# Patient Record
Sex: FEMALE | Race: ASIAN | Employment: FULL TIME | URBAN - METROPOLITAN AREA
[De-identification: names, ages, dates, MRNs, and addresses within clinical notes are randomized per-mention and may not be internally consistent; named-entity substitution may affect disease eponyms.]

---

## 2020-02-18 ENCOUNTER — OFFICE VISIT (OUTPATIENT)
Dept: PRIMARY CARE CLINIC | Age: 40
End: 2020-02-18
Payer: COMMERCIAL

## 2020-02-18 VITALS
OXYGEN SATURATION: 99 % | SYSTOLIC BLOOD PRESSURE: 122 MMHG | HEIGHT: 61 IN | DIASTOLIC BLOOD PRESSURE: 72 MMHG | BODY MASS INDEX: 26.09 KG/M2 | HEART RATE: 93 BPM | WEIGHT: 138.2 LBS | RESPIRATION RATE: 16 BRPM

## 2020-02-18 PROBLEM — G56.01 CARPAL TUNNEL SYNDROME OF RIGHT WRIST: Status: ACTIVE | Noted: 2020-02-18

## 2020-02-18 PROBLEM — K44.9 HIATAL HERNIA: Status: ACTIVE | Noted: 2020-02-18

## 2020-02-18 PROBLEM — K21.9 GASTROESOPHAGEAL REFLUX DISEASE WITHOUT ESOPHAGITIS: Status: ACTIVE | Noted: 2020-02-18

## 2020-02-18 PROCEDURE — 99386 PREV VISIT NEW AGE 40-64: CPT | Performed by: FAMILY MEDICINE

## 2020-02-18 PROCEDURE — G8484 FLU IMMUNIZE NO ADMIN: HCPCS | Performed by: FAMILY MEDICINE

## 2020-02-18 RX ORDER — PREDNISONE 10 MG/1
TABLET ORAL
Qty: 18 TABLET | Refills: 0 | Status: SHIPPED
Start: 2020-02-18 | End: 2020-03-17 | Stop reason: ALTCHOICE

## 2020-02-18 RX ORDER — PANTOPRAZOLE SODIUM 40 MG/1
40 TABLET, DELAYED RELEASE ORAL
Qty: 30 TABLET | Refills: 5 | Status: SHIPPED
Start: 2020-02-18 | End: 2020-03-26 | Stop reason: SDUPTHER

## 2020-02-18 RX ORDER — FLUTICASONE PROPIONATE 50 MCG
2 SPRAY, SUSPENSION (ML) NASAL DAILY
Qty: 1 BOTTLE | Refills: 5 | Status: SHIPPED
Start: 2020-02-18 | End: 2020-04-26 | Stop reason: SDUPTHER

## 2020-02-18 ASSESSMENT — ENCOUNTER SYMPTOMS
SINUS COMPLAINT: 1
SWOLLEN GLANDS: 0
VOMITING: 0
EYE REDNESS: 0
ABDOMINAL PAIN: 1
EYE PAIN: 0
CHEST TIGHTNESS: 0
COUGH: 0
CONSTIPATION: 0
SINUS PRESSURE: 1
BACK PAIN: 0
RHINORRHEA: 0
SHORTNESS OF BREATH: 0
COLOR CHANGE: 0
EYE ITCHING: 0
APNEA: 0
BLOOD IN STOOL: 0
NAUSEA: 0
WHEEZING: 0
SORE THROAT: 0
BELCHING: 1
DIARRHEA: 0

## 2020-02-18 ASSESSMENT — PATIENT HEALTH QUESTIONNAIRE - PHQ9
2. FEELING DOWN, DEPRESSED OR HOPELESS: 0
1. LITTLE INTEREST OR PLEASURE IN DOING THINGS: 0
SUM OF ALL RESPONSES TO PHQ QUESTIONS 1-9: 0
SUM OF ALL RESPONSES TO PHQ9 QUESTIONS 1 & 2: 0
SUM OF ALL RESPONSES TO PHQ QUESTIONS 1-9: 0

## 2020-02-18 NOTE — PROGRESS NOTES
Chief Complaint:     Chief Complaint   Patient presents with    [facility]    Sinus Problem    Abdominal Pain    Hand Pain         Sinus Problem   This is a recurrent problem. The current episode started more than 1 month ago. The problem has been waxing and waning since onset. There has been no fever. The pain is moderate. Associated symptoms include congestion, headaches and sinus pressure. Pertinent negatives include no coughing, diaphoresis, ear pain, neck pain, shortness of breath, sore throat or swollen glands. Past treatments include nothing. The treatment provided no relief. Abdominal Pain   This is a recurrent problem. The current episode started more than 1 month ago. The problem occurs intermittently. The problem has been waxing and waning. The pain is located in the epigastric region. The quality of the pain is aching and dull. The abdominal pain does not radiate. Associated symptoms include belching and headaches. Pertinent negatives include no arthralgias, constipation, diarrhea, dysuria, fever, frequency, hematuria, myalgias, nausea, vomiting or weight loss. Nothing aggravates the pain. The pain is relieved by nothing. She has tried nothing for the symptoms. The treatment provided no relief. Hand Pain    The incident occurred more than 1 week ago. The injury mechanism was repetitive motion. The pain is present in the right hand and right wrist. The quality of the pain is described as aching and burning. Associated symptoms include muscle weakness, numbness and tingling. Pertinent negatives include no chest pain. The symptoms are aggravated by movement, lifting and palpation. She has tried rest for the symptoms. The treatment provided mild relief.        Patient Active Problem List   Diagnosis    Gastroesophageal reflux disease without esophagitis    Hiatal hernia    Carpal tunnel syndrome of right wrist       Past Medical History:   Diagnosis Date    Hypothyroidism        History fatigue, fever and weight loss. HENT: Positive for congestion and sinus pressure. Negative for ear pain, hearing loss, nosebleeds, rhinorrhea and sore throat. Eyes: Negative for pain, redness, itching and visual disturbance. Respiratory: Negative for apnea, cough, chest tightness, shortness of breath and wheezing. Cardiovascular: Negative for chest pain, palpitations and leg swelling. Gastrointestinal: Positive for abdominal pain. Negative for blood in stool, constipation, diarrhea, nausea and vomiting. Endocrine: Negative. Genitourinary: Negative for decreased urine volume, difficulty urinating, dysuria, frequency, hematuria and urgency. Musculoskeletal: Negative for arthralgias, back pain, gait problem, myalgias and neck pain. Skin: Negative for color change and rash. Allergic/Immunologic: Negative for environmental allergies and food allergies. Neurological: Positive for tingling, numbness and headaches. Negative for dizziness, weakness and light-headedness. Hematological: Negative for adenopathy. Does not bruise/bleed easily. Psychiatric/Behavioral: Negative for behavioral problems, dysphoric mood and sleep disturbance. The patient is not nervous/anxious and is not hyperactive. All other systems reviewed and are negative. /72   Pulse 93   Resp 16   Ht 5' 1\" (1.549 m)   Wt 138 lb 3.2 oz (62.7 kg)   LMP 02/03/2020   SpO2 99%   BMI 26.11 kg/m²     Physical Exam  Vitals signs and nursing note reviewed. Constitutional:       General: She is not in acute distress. Appearance: Normal appearance. She is well-developed. HENT:      Head: Normocephalic and atraumatic. Right Ear: Hearing, tympanic membrane and external ear normal. No tenderness. No middle ear effusion. Left Ear: Hearing, tympanic membrane and external ear normal. No tenderness. No middle ear effusion. Nose: Nose normal. No congestion or rhinorrhea.       Right Turbinates: Not enlarged. Left Turbinates: Not enlarged. Mouth/Throat:      Mouth: Mucous membranes are moist.      Tongue: No lesions. Pharynx: Oropharynx is clear. No oropharyngeal exudate or posterior oropharyngeal erythema. Eyes:      General: No scleral icterus. Conjunctiva/sclera: Conjunctivae normal.      Pupils: Pupils are equal, round, and reactive to light. Neck:      Musculoskeletal: Normal range of motion and neck supple. No neck rigidity or muscular tenderness. Thyroid: No thyromegaly. Cardiovascular:      Rate and Rhythm: Normal rate and regular rhythm. Heart sounds: Normal heart sounds. No murmur. Pulmonary:      Effort: Pulmonary effort is normal. No respiratory distress. Breath sounds: Normal breath sounds. No wheezing or rales. Abdominal:      General: Bowel sounds are normal. There is no distension. Palpations: Abdomen is soft. Tenderness: There is no abdominal tenderness. Musculoskeletal: Normal range of motion. General: No tenderness. Lymphadenopathy:      Cervical: No cervical adenopathy. Skin:     General: Skin is warm and dry. Findings: No erythema or rash. Neurological:      General: No focal deficit present. Mental Status: She is alert and oriented to person, place, and time. Cranial Nerves: No cranial nerve deficit. Deep Tendon Reflexes: Reflexes are normal and symmetric. Reflexes normal.   Psychiatric:         Mood and Affect: Mood normal.                                 ASSESSMENT/PLAN:    Patient Active Problem List   Diagnosis    Gastroesophageal reflux disease without esophagitis    Hiatal hernia    Carpal tunnel syndrome of right wrist       Doyne Syracuse was seen today for establish care, sinus problem, abdominal pain and hand pain.     Diagnoses and all orders for this visit:    History of nasal polyp  -     [facility] [place] [place], DO, Ear, Nose, Throat, [place]    Gastroesophageal reflux disease without esophagitis    Hiatal hernia  -     XR CHEST STANDARD (2 VW); Future  -     XR ABDOMEN (KUB) (SINGLE AP VIEW); Future    Carpal tunnel syndrome of right wrist  -     XR WRIST RIGHT (MIN 3 VIEWS); Future  -     Eleanor Abreu MD, [facility], [place] [address]    Encounter for general adult medical examination with abnormal findings  -     CBC; Future  -     Comprehensive Metabolic Panel; Future  -     Lipid Panel; Future  -     TSH without Reflex; Future    Chronic sinusitis, unspecified location  -     [facility], , Ear, Nose, Throat, [place]    Other orders  -     fluticasone (FLONASE) 50 MCG/ACT nasal spray; 2 sprays by Each Nostril route daily  -     pantoprazole (PROTONIX) 40 MG tablet; Take 1 tablet by mouth every morning (before breakfast)  -     predniSONE (DELTASONE) 10 MG tablet; 30mg daily x3 days, then 20mg daily x3 days, then 10mg daily x3 days          Return in about 1 year (around 2/18/2021) for well check. I spent 45 minutes with this patient. I spent greater than 50% of the time counseling this patient.         Regi Palma, DO  2/18/2020  3:34 PM

## 2020-02-22 ENCOUNTER — HOSPITAL ENCOUNTER (OUTPATIENT)
Age: 40
Discharge: HOME | End: 2020-02-24
Payer: COMMERCIAL

## 2020-02-22 LAB
ALBUMIN SERPL-MCNC: 4.5 G/DL (ref 3.5–5.2)
ALP BLD-CCNC: 62 U/L (ref 35–104)
ALT SERPL-CCNC: 13 U/L (ref 0–32)
ANION GAP SERPL CALCULATED.3IONS-SCNC: 16 MMOL/L (ref 7–16)
AST SERPL-CCNC: 16 U/L (ref 0–31)
BILIRUB SERPL-MCNC: 0.5 MG/DL (ref 0–1.2)
BUN BLDV-MCNC: 15 MG/DL (ref 6–20)
CALCIUM SERPL-MCNC: 9.8 MG/DL (ref 8.6–10.2)
CHLORIDE BLD-SCNC: 98 MMOL/L (ref 98–107)
CHOLESTEROL, TOTAL: 332 MG/DL (ref 0–199)
CO2: 20 MMOL/L (ref 22–29)
CREAT SERPL-MCNC: 0.5 MG/DL (ref 0.5–1)
GFR AFRICAN AMERICAN: >60
GFR NON-AFRICAN AMERICAN: >60 ML/MIN/1.73
GLUCOSE BLD-MCNC: 279 MG/DL (ref 74–99)
HCT VFR BLD CALC: 46.4 % (ref 34–48)
HDLC SERPL-MCNC: 61 MG/DL
HEMOGLOBIN: 15.3 G/DL (ref 11.5–15.5)
LDL CHOLESTEROL CALCULATED: 243 MG/DL (ref 0–99)
MCH RBC QN AUTO: 28.6 PG (ref 26–35)
MCHC RBC AUTO-ENTMCNC: 33 % (ref 32–34.5)
MCV RBC AUTO: 86.7 FL (ref 80–99.9)
PDW BLD-RTO: 11.9 FL (ref 11.5–15)
PLATELET # BLD: 204 E9/L (ref 130–450)
PMV BLD AUTO: 12.4 FL (ref 7–12)
POTASSIUM SERPL-SCNC: 4.1 MMOL/L (ref 3.5–5)
RBC # BLD: 5.35 E12/L (ref 3.5–5.5)
SODIUM BLD-SCNC: 134 MMOL/L (ref 132–146)
TOTAL PROTEIN: 8.1 G/DL (ref 6.4–8.3)
TRIGL SERPL-MCNC: 138 MG/DL (ref 0–149)
TSH SERPL DL<=0.05 MIU/L-ACNC: 2.89 UIU/ML (ref 0.27–4.2)
VLDLC SERPL CALC-MCNC: 28 MG/DL
WBC # BLD: 7.9 E9/L (ref 4.5–11.5)

## 2020-02-22 PROCEDURE — 85027 COMPLETE CBC AUTOMATED: CPT

## 2020-02-22 PROCEDURE — 36415 COLL VENOUS BLD VENIPUNCTURE: CPT

## 2020-02-22 PROCEDURE — 80053 COMPREHEN METABOLIC PANEL: CPT

## 2020-02-22 PROCEDURE — 84443 ASSAY THYROID STIM HORMONE: CPT

## 2020-02-22 PROCEDURE — 80061 LIPID PANEL: CPT

## 2020-02-24 RX ORDER — ATORVASTATIN CALCIUM 20 MG/1
20 TABLET, FILM COATED ORAL DAILY
Qty: 30 TABLET | Refills: 5 | Status: SHIPPED
Start: 2020-02-24 | End: 2020-06-18 | Stop reason: SDUPTHER

## 2020-02-24 RX ORDER — GLIMEPIRIDE 2 MG/1
2 TABLET ORAL
Qty: 30 TABLET | Refills: 5 | Status: SHIPPED
Start: 2020-02-24 | End: 2020-03-26 | Stop reason: SDUPTHER

## 2020-03-04 ENCOUNTER — TELEPHONE (OUTPATIENT)
Dept: PRIMARY CARE CLINIC | Age: 40
End: 2020-03-04

## 2020-03-04 RX ORDER — BLOOD-GLUCOSE METER
1 KIT MISCELLANEOUS DAILY
Qty: 1 KIT | Refills: 0 | Status: SHIPPED
Start: 2020-03-04 | End: 2021-10-08

## 2020-03-04 RX ORDER — LANCETS 28 GAUGE
1 EACH MISCELLANEOUS DAILY
Qty: 100 EACH | Refills: 3 | Status: SHIPPED
Start: 2020-03-04 | End: 2020-10-22 | Stop reason: SDUPTHER

## 2020-03-09 ENCOUNTER — HOSPITAL ENCOUNTER (OUTPATIENT)
Dept: NEUROLOGY | Age: 40
Discharge: HOME | End: 2020-03-09
Payer: COMMERCIAL

## 2020-03-09 PROCEDURE — 95911 NRV CNDJ TEST 9-10 STUDIES: CPT

## 2020-03-09 PROCEDURE — 95886 MUSC TEST DONE W/N TEST COMP: CPT

## 2020-03-09 NOTE — LETTER
RE:  Charles Woodsydnee    Dear Dr. Luz Marina Chavarria,     Enclosed you will find a copy of the requested EMG on your patient, Charles Sheffield completed 3/9/2020. EMG Findings:     Donna Burrows MD   Physician   Internal Medicine   Procedures   Signed   Date of Service:  3/9/2020  1:30 PM            Procedure Orders   EMG [[id number]] ordered by  at 02/19/20 1142   Pre-procedure Diagnoses   Carpal tunnel syndrome of right wrist [G56.01]   Procedures   EMG [NEU5]          Signed             Show:Clear all  [x]Manual[x]Template[x]Copied    Added by:  Debbie Painter MD    []Shaylee for details   [address] [place] [address]  Electrodiagnostic Laboratory   [place]          Full Name:    Charles Sheffield                            Gender:             Female  MRN:             [id number]                                                   YOB: 1980  Location[de-identified]     [facility] ([facility])      Visit Date:                          3/9/2020 11:07  Age:                                   35 Years 9 Months Old  Examining Physician:      James Hope   Referring Physician:        Dr. Azar Cabrera  Technician:                       Viridiana Heath   Height:                               5 feet 0 inch  Weight:                              138 lbs  Notes:                                Carpal Tunnel Syndrome   BMI: 27.0      Motor NCS      Nerve / Sites Lat. Lat Diff Amplitude Amp. 1-2 Distance Velocity Temp.     ms ms mV % cm m/s °C   R Median - APB      Wrist 5.36   2.8 100 8   32.9      Elbow 8.49 3.12 2.1 90 18 58 32.9   L Median - APB      Wrist 6.15   7.4 100 8   32.6      Elbow 10.36 4.22 7.3 98.5 18 43 32.6   R Ulnar - ADM      Wrist 2.97   11.0 100 8   32.4      B. Elbow 5.99 3.02 10.7 97.6 17 56 32.2      A. Elbow 7.71 1.72 10.3 94 10 58 32.1   L Ulnar - ADM      Wrist 2.81   8.0 100 8   32.1      B. Elbow 6.35 3.54 7.6 94.8 17 48 32.2  Highest education level: Not on file   Occupational History    Not on file   Social Needs    Financial resource strain: Not on file    Food insecurity     Worry: Not on file     Inability: Not on file    Transportation needs     Medical: Not on file     Non-medical: Not on file   Tobacco Use    Smoking status: Never Smoker    Smokeless tobacco: Never Used   Substance and Sexual Activity    Alcohol use: Not on file    Drug use: Not on file    Sexual activity: Not on file   Lifestyle    Physical activity     Days per week: Not on file     Minutes per session: Not on file    Stress: Not on file   Relationships    Social connections     Talks on phone: Not on file     Gets together: Not on file     Attends Christianity service: Not on file     Active member of club or organization: Not on file     Attends meetings of clubs or organizations: Not on file     Relationship status: Not on file    Intimate partner violence     Fear of current or ex partner: Not on file     Emotionally abused: Not on file     Physically abused: Not on file     Forced sexual activity: Not on file   Other Topics Concern    Not on file   Social History Narrative    Not on file       Family History:  No family history on file. Exam: Feels a 40-year-old female 5 foot weighing 138 pounds. Cognitively intact and following commands. Skin: Skin in the upper extremity normal in color, texture, and temperature. There is a ganglion cyst present in the ventral surface of the left wrist.    Motor examination reveals decrease  intrinsic activity in both hands and opposition. .  Manger of the motor examination within accepted limits of normal.  No atrophy. Tone is normal.    Sensory examination upper extremities intact to touch. .     Reflexes symmetrical in the upper extremities. .     Cervical range of motion is within normal limits. Shoulder range of motion normal.  Positive Tinel sign bilaterally.     Summary: Patient is in the severe category of bilateral carpal tunnel entrapment at the wrist.  Recommendation is surgical decompression. I have referred her back to her primary care provider for further management and arrangements for treatment. Thank you. If there are any questions, please contact me for discussion. Thank you once again for allowing me to participate along with you in his behalf.             Electronically signed by Maribeth Mcleod MD on 6/0/2581 at 2:52 PM

## 2020-03-09 NOTE — PROCEDURES
EMG [facility]  Electrodiagnostic Laboratory   [place]         Full Name: Ja Reynaga Gender: Female  MRN: [id number] YOB: 1980  Location[de-identified] [facility] (2)      Visit Date: 3/9/2020 11:07  Age: 35 Years 9 Months Old  Examining Physician: Miriam Hope   Referring Physician: Dr. Merary Jeronimo  Technician: Brenna Garduno   Height: 5 feet 0 inch  Weight: 138 lbs  Notes: Carpal Tunnel Syndrome   BMI: 27.0      Motor NCS      Nerve / Sites Lat. Lat Diff Amplitude Amp. 1-2 Distance Velocity Temp.    ms ms mV % cm m/s °C   R Median - APB      Wrist 5.36  2.8 100 8  32.9      Elbow 8.49 3.12 2.1 90 18 58 32.9   L Median - APB      Wrist 6.15  7.4 100 8  32.6      Elbow 10.36 4.22 7.3 98.5 18 43 32.6   R Ulnar - ADM      Wrist 2.97  11.0 100 8  32.4      B. Elbow 5.99 3.02 10.7 97.6 17 56 32.2      A. Elbow 7.71 1.72 10.3 94 10 58 32.1   L Ulnar - ADM      Wrist 2.81  8.0 100 8  32.1      B. Elbow 6.35 3.54 7.6 94.8 17 48 32.2      A. Elbow 8.02 1.67 7.6 94.8 10 60 32.1       Sensory NCS      Nerve / Sites Onset Lat Peak Lat PP Amp Distance Velocity Temp.    ms ms µV cm m/s °C   L Median - Digit II (Antidromic)      Mid Palm NR NR NR 7 NR 32.1      Wrist NR NR NR 14 NR 32.1   R Median - Digit II (Antidromic)      Mid Palm NR NR NR 7 NR 32.6      Wrist NR NR NR 14 NR 32.6   L Ulnar - Digit V (Antidromic)      Wrist 2.66 3.54 23.8 14 53 32.4   R Ulnar - Digit V (Antidromic)      Wrist 2.97 3.85 8.1 14 47 32.6   R Radial - Anatomical snuff box (Forearm)      Forearm 2.08 2.76 24.4 10 48 32.5   L Radial - Anatomical snuff box (Forearm)      Forearm 1.88 2.76 15.3 10 53 32.4       F  Wave      Nerve F Lat M Lat F-M Lat    ms ms ms   R Median - APB 31.7 5.7 26.0   R Ulnar - ADM 28.4 2.2 26.2   L Median - APB 32.0 6.2 25.8   L Ulnar - ADM 27.7 3.0 24.7       EMG         EMG Summary Table     Spontaneous MUAP Recruitment   Muscle IA Fib PSW Fasc H.F. Amp Dur.  PPP Pattern   R. extremities demonstrate no response to attempted stimulation of the median nerve bilaterally. Ulnar and radial sensory latencies within normal limits. .    F responses for the median and ulnar nerves, within accepted limits of normal.    Insertional activity in the upper extremity revealing positive waves, serrated potentials and polyphasic units found in the abductor pollicis brevis bilaterally. Please refer to the above summarization table for insertional activity for further details. .        Impression:      #1  Severe bilateral carpal tunnel syndromes    #2  Normal nerve conduction studies of the ulnar and radial nerves bilaterally. #3 no diagnostic evidence of a cervical motor radiculopathy. Pure sensory radiculopathies cannot be detected by electrodiagnostic technique. Suggestions: Clinical correlation. Commendation for decompressive surgery.     Thank you      Electronically signed by Mirlande Phillips MD on 4/6/1965 at 2:27 PM

## 2020-03-11 ENCOUNTER — TELEPHONE (OUTPATIENT)
Dept: ORTHOPEDIC SURGERY | Age: 40
End: 2020-03-11

## 2020-03-16 ENCOUNTER — OFFICE VISIT (OUTPATIENT)
Dept: ORTHOPEDIC SURGERY | Age: 40
End: 2020-03-16
Payer: COMMERCIAL

## 2020-03-16 VITALS
RESPIRATION RATE: 16 BRPM | DIASTOLIC BLOOD PRESSURE: 88 MMHG | BODY MASS INDEX: 26.06 KG/M2 | HEART RATE: 96 BPM | SYSTOLIC BLOOD PRESSURE: 130 MMHG | WEIGHT: 138 LBS | HEIGHT: 61 IN

## 2020-03-16 PROCEDURE — G8419 CALC BMI OUT NRM PARAM NOF/U: HCPCS | Performed by: ORTHOPAEDIC SURGERY

## 2020-03-16 PROCEDURE — 1036F TOBACCO NON-USER: CPT | Performed by: ORTHOPAEDIC SURGERY

## 2020-03-16 PROCEDURE — G8484 FLU IMMUNIZE NO ADMIN: HCPCS | Performed by: ORTHOPAEDIC SURGERY

## 2020-03-16 PROCEDURE — G8427 DOCREV CUR MEDS BY ELIG CLIN: HCPCS | Performed by: ORTHOPAEDIC SURGERY

## 2020-03-16 PROCEDURE — 99204 OFFICE O/P NEW MOD 45 MIN: CPT | Performed by: ORTHOPAEDIC SURGERY

## 2020-03-16 NOTE — PROGRESS NOTES
Department of Orthopedic Surgery  History and Physical      CHIEF COMPLAINT:  Right greater than left hand numbness and tingling    HISTORY OF PRESENT ILLNESS:                The patient is a 35 y.o. female who presents with right greater than left hand numbness and tingling. Patient states she has right greater than left hand numbness and tingling. Patient states she is working at a new job at [employer]. She states after starting the job in October she had a right greater than left hand numbness and tingling to her thumb index and middle fingers 2 weeks after starting her job. She reports shooting pain up her arm. She states she cannot sleep secondary to pain along with numbness tingling and swelling. Patient states she does wear bracing at night. She states this is not helped her symptoms. She did have an EMG and nerve conduction study test on March 9 of 2020. Right median nerve motor latency was 5.36 and left was 6. 15. Right median nerve sensory latency was non-recordable and left was also nonreproducible. She did have changes to bilateral APBs. This is consistent with bilateral severe CTS. Past Medical History:        Diagnosis Date    Hypothyroidism      Past Surgical History:    No past surgical history on file. Current Medications:   No current facility-administered medications for this visit. Allergies:  Iodine and Shellfish-derived products    Social History:   TOBACCO:   reports that she has never smoked. She has never used smokeless tobacco.  ETOH:   has no history on file for alcohol. DRUGS:   has no history on file for drug. ACTIVITIES OF DAILY LIVING:    OCCUPATION:    Family History:   No family history on file.     REVIEW OF SYSTEMS:  CONSTITUTIONAL:  negative  EYES:  negative  HEENT:  negative  RESPIRATORY:  negative  CARDIOVASCULAR:  negative  GASTROINTESTINAL:  negative  GENITOURINARY:  negative  INTEGUMENT/BREAST: carpal tunnel, + durkans, - finkelsteins, - CMC grind, - tenderness over the A1 pulley with no active triggering. Full flexion and and extension of the fingers. Diminished sensation to the thumb, index, and middle finger. - wartenbergs and cross finger testing. APB strength 5/5 with no atrophy. Ulnar, radial n intact to light touch. Brisk capillary refill. Gross motor 5/5. DATA:    CBC:   Lab Results   Component Value Date    WBC 7.9 02/22/2020    RBC 5.35 02/22/2020    HGB 15.3 02/22/2020    HCT 46.4 02/22/2020    MCV 86.7 02/22/2020    MCH 28.6 02/22/2020    MCHC 33.0 02/22/2020    RDW 11.9 02/22/2020     02/22/2020    MPV 12.4 02/22/2020     PT/INR:  No results found for: PROTIME, INR    Radiology Review: X-rays of bilateral wrists were obtained today in the office and reviewed with the patient. 4 views: AP, lateral, oblique, and carpal tunnel view demonstrate no acute fractures or dislocations. Soft tissues within normal visit. Impression: no acute fractures or dislocations    IMPRESSION:  · Severe bilateral carpal tunnel syndrome    PLAN:  Discussed findings with the patient. Discussed conservative and surgical management with the patient. Recommended a right carpal tunnel release followed by a left carpal tunnel release. Postoperative course explained to the patient. Patient like to proceed. All questions answered. I explained the risks, benefits, alternatives and complications of surgery with the patient including but not limited to the risks of infection, possible damage to nerves, vessels, or tendons, stiffness, loss of range of motion, scar sensitivity, wound healing complications, worsening symptoms, possible need for therapy, as well as the possible need further surgery and unanticipated complications. The patient voiced understanding and all questions were answered. The patient elected to proceed with surgical intervention.        I have seen and evaluated the patient and agree with the above assessment and plan on today's visit. I have performed the key components of the history and physical examination with significant findings of severe bilateral carpal tunnel syndrome. I concur with the findings and plan as documented.     Kailee Hernandez MD  3/16/2020

## 2020-03-17 ENCOUNTER — PREP FOR PROCEDURE (OUTPATIENT)
Dept: ORTHOPEDIC SURGERY | Age: 40
End: 2020-03-17

## 2020-03-17 RX ORDER — SODIUM CHLORIDE 9 MG/ML
INJECTION, SOLUTION INTRAVENOUS CONTINUOUS
Status: CANCELLED | OUTPATIENT
Start: 2020-03-17

## 2020-03-17 RX ORDER — SODIUM CHLORIDE 0.9 % (FLUSH) 0.9 %
10 SYRINGE (ML) INJECTION PRN
Status: CANCELLED | OUTPATIENT
Start: 2020-03-17

## 2020-03-17 RX ORDER — SODIUM CHLORIDE 0.9 % (FLUSH) 0.9 %
10 SYRINGE (ML) INJECTION EVERY 12 HOURS SCHEDULED
Status: CANCELLED | OUTPATIENT
Start: 2020-03-17

## 2020-03-17 SDOH — HEALTH STABILITY: MENTAL HEALTH: HOW OFTEN DO YOU HAVE A DRINK CONTAINING ALCOHOL?: NEVER

## 2020-03-18 ENCOUNTER — ANESTHESIA EVENT (OUTPATIENT)
Dept: OPERATING ROOM | Age: 40
End: 2020-03-18
Payer: COMMERCIAL

## 2020-03-18 ENCOUNTER — HOSPITAL ENCOUNTER (OUTPATIENT)
Age: 40
Setting detail: OUTPATIENT SURGERY
Discharge: HOME | End: 2020-03-18
Attending: ORTHOPAEDIC SURGERY | Admitting: ORTHOPAEDIC SURGERY
Payer: COMMERCIAL

## 2020-03-18 ENCOUNTER — ANESTHESIA (OUTPATIENT)
Dept: OPERATING ROOM | Age: 40
End: 2020-03-18
Payer: COMMERCIAL

## 2020-03-18 VITALS
BODY MASS INDEX: 26.06 KG/M2 | WEIGHT: 138 LBS | HEART RATE: 98 BPM | DIASTOLIC BLOOD PRESSURE: 62 MMHG | HEIGHT: 61 IN | RESPIRATION RATE: 15 BRPM | SYSTOLIC BLOOD PRESSURE: 107 MMHG | TEMPERATURE: 97.4 F | OXYGEN SATURATION: 98 %

## 2020-03-18 VITALS — SYSTOLIC BLOOD PRESSURE: 101 MMHG | DIASTOLIC BLOOD PRESSURE: 54 MMHG | OXYGEN SATURATION: 95 %

## 2020-03-18 LAB
HCG(URINE) PREGNANCY TEST: NEGATIVE
METER GLUCOSE: 203 MG/DL (ref 74–99)

## 2020-03-18 PROCEDURE — 2580000003 HC RX 258: Performed by: NURSE ANESTHETIST, CERTIFIED REGISTERED

## 2020-03-18 PROCEDURE — 81025 URINE PREGNANCY TEST: CPT

## 2020-03-18 PROCEDURE — 3600000002 HC SURGERY LEVEL 2 BASE: Performed by: ORTHOPAEDIC SURGERY

## 2020-03-18 PROCEDURE — 2580000003 HC RX 258: Performed by: PHYSICIAN ASSISTANT

## 2020-03-18 PROCEDURE — 7100000010 HC PHASE II RECOVERY - FIRST 15 MIN: Performed by: ORTHOPAEDIC SURGERY

## 2020-03-18 PROCEDURE — 82962 GLUCOSE BLOOD TEST: CPT

## 2020-03-18 PROCEDURE — 2500000003 HC RX 250 WO HCPCS: Performed by: ORTHOPAEDIC SURGERY

## 2020-03-18 PROCEDURE — 3700000001 HC ADD 15 MINUTES (ANESTHESIA): Performed by: ORTHOPAEDIC SURGERY

## 2020-03-18 PROCEDURE — 3600000012 HC SURGERY LEVEL 2 ADDTL 15MIN: Performed by: ORTHOPAEDIC SURGERY

## 2020-03-18 PROCEDURE — 6360000002 HC RX W HCPCS: Performed by: NURSE ANESTHETIST, CERTIFIED REGISTERED

## 2020-03-18 PROCEDURE — 2500000003 HC RX 250 WO HCPCS: Performed by: NURSE ANESTHETIST, CERTIFIED REGISTERED

## 2020-03-18 PROCEDURE — 3700000000 HC ANESTHESIA ATTENDED CARE: Performed by: ORTHOPAEDIC SURGERY

## 2020-03-18 PROCEDURE — 7100000011 HC PHASE II RECOVERY - ADDTL 15 MIN: Performed by: ORTHOPAEDIC SURGERY

## 2020-03-18 PROCEDURE — 6360000002 HC RX W HCPCS: Performed by: PHYSICIAN ASSISTANT

## 2020-03-18 PROCEDURE — 64721 CARPAL TUNNEL SURGERY: CPT | Performed by: ORTHOPAEDIC SURGERY

## 2020-03-18 PROCEDURE — 2709999900 HC NON-CHARGEABLE SUPPLY: Performed by: ORTHOPAEDIC SURGERY

## 2020-03-18 RX ORDER — FENTANYL CITRATE 50 UG/ML
INJECTION, SOLUTION INTRAMUSCULAR; INTRAVENOUS PRN
Status: DISCONTINUED | OUTPATIENT
Start: 2020-03-18 | End: 2020-03-18 | Stop reason: SDUPTHER

## 2020-03-18 RX ORDER — SODIUM CHLORIDE 0.9 % (FLUSH) 0.9 %
10 SYRINGE (ML) INJECTION EVERY 12 HOURS SCHEDULED
Status: DISCONTINUED | OUTPATIENT
Start: 2020-03-18 | End: 2020-03-18 | Stop reason: HOSPADM

## 2020-03-18 RX ORDER — SODIUM CHLORIDE 0.9 % (FLUSH) 0.9 %
10 SYRINGE (ML) INJECTION PRN
Status: DISCONTINUED | OUTPATIENT
Start: 2020-03-18 | End: 2020-03-18 | Stop reason: HOSPADM

## 2020-03-18 RX ORDER — HYDROCODONE BITARTRATE AND ACETAMINOPHEN 5; 325 MG/1; MG/1
1 TABLET ORAL EVERY 6 HOURS PRN
Qty: 12 TABLET | Refills: 0 | Status: SHIPPED | OUTPATIENT
Start: 2020-03-18 | End: 2020-03-25

## 2020-03-18 RX ORDER — HYDROCODONE BITARTRATE AND ACETAMINOPHEN 5; 325 MG/1; MG/1
2 TABLET ORAL PRN
Status: DISCONTINUED | OUTPATIENT
Start: 2020-03-18 | End: 2020-03-18 | Stop reason: HOSPADM

## 2020-03-18 RX ORDER — PROPOFOL 10 MG/ML
INJECTION, EMULSION INTRAVENOUS CONTINUOUS PRN
Status: DISCONTINUED | OUTPATIENT
Start: 2020-03-18 | End: 2020-03-18 | Stop reason: SDUPTHER

## 2020-03-18 RX ORDER — MIDAZOLAM HYDROCHLORIDE 1 MG/ML
INJECTION INTRAMUSCULAR; INTRAVENOUS PRN
Status: DISCONTINUED | OUTPATIENT
Start: 2020-03-18 | End: 2020-03-18 | Stop reason: SDUPTHER

## 2020-03-18 RX ORDER — SODIUM CHLORIDE, SODIUM LACTATE, POTASSIUM CHLORIDE, CALCIUM CHLORIDE 600; 310; 30; 20 MG/100ML; MG/100ML; MG/100ML; MG/100ML
INJECTION, SOLUTION INTRAVENOUS CONTINUOUS PRN
Status: DISCONTINUED | OUTPATIENT
Start: 2020-03-18 | End: 2020-03-18 | Stop reason: SDUPTHER

## 2020-03-18 RX ORDER — HYDROCODONE BITARTRATE AND ACETAMINOPHEN 5; 325 MG/1; MG/1
1 TABLET ORAL PRN
Status: DISCONTINUED | OUTPATIENT
Start: 2020-03-18 | End: 2020-03-18 | Stop reason: HOSPADM

## 2020-03-18 RX ORDER — CEFAZOLIN SODIUM 2 G/50ML
2 SOLUTION INTRAVENOUS
Status: COMPLETED | OUTPATIENT
Start: 2020-03-18 | End: 2020-03-18

## 2020-03-18 RX ORDER — SODIUM CHLORIDE 9 MG/ML
INJECTION, SOLUTION INTRAVENOUS CONTINUOUS
Status: DISCONTINUED | OUTPATIENT
Start: 2020-03-18 | End: 2020-03-18 | Stop reason: HOSPADM

## 2020-03-18 RX ORDER — LIDOCAINE HYDROCHLORIDE AND EPINEPHRINE 10; 10 MG/ML; UG/ML
INJECTION, SOLUTION INFILTRATION; PERINEURAL PRN
Status: DISCONTINUED | OUTPATIENT
Start: 2020-03-18 | End: 2020-03-18 | Stop reason: ALTCHOICE

## 2020-03-18 RX ORDER — LIDOCAINE HYDROCHLORIDE 20 MG/ML
INJECTION, SOLUTION INFILTRATION; PERINEURAL PRN
Status: DISCONTINUED | OUTPATIENT
Start: 2020-03-18 | End: 2020-03-18 | Stop reason: SDUPTHER

## 2020-03-18 RX ADMIN — MIDAZOLAM 2 MG: 1 INJECTION INTRAMUSCULAR; INTRAVENOUS at 14:26

## 2020-03-18 RX ADMIN — SODIUM CHLORIDE, POTASSIUM CHLORIDE, SODIUM LACTATE AND CALCIUM CHLORIDE: 600; 310; 30; 20 INJECTION, SOLUTION INTRAVENOUS at 14:26

## 2020-03-18 RX ADMIN — FENTANYL CITRATE 50 MCG: 50 INJECTION, SOLUTION INTRAMUSCULAR; INTRAVENOUS at 14:34

## 2020-03-18 RX ADMIN — Medication 10 ML: at 11:31

## 2020-03-18 RX ADMIN — FENTANYL CITRATE 50 MCG: 50 INJECTION, SOLUTION INTRAMUSCULAR; INTRAVENOUS at 14:46

## 2020-03-18 RX ADMIN — PROPOFOL 125 MCG/KG/MIN: 10 INJECTION, EMULSION INTRAVENOUS at 14:33

## 2020-03-18 RX ADMIN — CEFAZOLIN SODIUM 2 G: 2 SOLUTION INTRAVENOUS at 14:31

## 2020-03-18 RX ADMIN — LIDOCAINE HYDROCHLORIDE 50 MG: 20 INJECTION, SOLUTION INFILTRATION; PERINEURAL at 14:34

## 2020-03-18 ASSESSMENT — PAIN - FUNCTIONAL ASSESSMENT: PAIN_FUNCTIONAL_ASSESSMENT: 0-10

## 2020-03-18 ASSESSMENT — PAIN SCALES - GENERAL: PAINLEVEL_OUTOF10: 0

## 2020-03-18 NOTE — OP NOTE
DATE OF PROCEDURE: 3/18/2020  SURGEON: Diaz Brar M.D.  ASSISTANT: JOE Burris. She was present throughout the procedure. Her assistance was used for preoperative positioning, intraoperative retraction, closure, and dressing application. Her assistance expedited the case and decreased the surgical time. An orthopedic resident was not available for case coverage. PREOPERATIVE DIAGNOSIS: right carpal tunnel syndrome. POSTOPERATIVE DIAGNOSIS: right carpal tunnel syndrome. OPERATION: right carpal tunnel release. ANESTHESIA:  1. Monitored anesthesia care  2. Local anesthetic by surgeon consisting of 10cc of 1% lidocaine with epinephrine  ESTIMATED BLOOD LOSS: Minimal  COMPLICATIONS: None. TOTAL TOURNIQUET TIME: Approximately 5 minutes, 250 mm brachial tourniquet. DISPOSITION: The patient was stable throughout the procedure. FINDINGS:  1. Evidence of median nerve compression beneath the transverse carpal  ligament. It was adequately decompressed with release of the transverse  carpal ligament. 2. Status post decompression, the median nerve was fully decompressed  throughout its course including distal forearm fascia through the carpal  tunnel to its trifurcation distally  OPERATIVE INDICATION: The patient is a very pleasant patient with  persistent and recalcitrant of carpal tunnel syndrome. After failing  conservative management, she wished to proceed with carpal tunnel release. Risks, benefits, alternatives, and complications were fully explained to her  including, but not limited to, the risk of infection, damage to  nerves/vessels/tendons, failure to relieve her symptoms, worsening symptoms,  recurrence of symptoms, pillar pain, thenar pain, hypothenar pain, scar sensitivity as well as unforeseen complications. She voiced her understanding and wished to proceed. PROCEDURE IN DETAIL: The patient was identified in the holding area. The  right hand was identified as the operative site.  She was 2:49 PM

## 2020-03-18 NOTE — BRIEF OP NOTE
Brief Postoperative Note  ______________________________________________________________    Patient: Bridger Nichols  YOB: 1980  MRN: [id number]  Date of Procedure: 3/18/2020    Pre-Op Diagnosis: RIGHT CARPAL TUNNEL SYNDROME    Post-Op Diagnosis: Same       Procedure(s):  RIGHT CARPAL TUNNEL RELEASE    Anesthesia: Monitor Anesthesia Care    Surgeon(s):  Daniele Olivares MD    Assistant: sera    Estimated Blood Loss (mL): less than 50     Complications: None    Specimens:   * No specimens in log *    Implants:  * No implants in log *      Drains: * No LDAs found *    Findings: see op note    MAMADOU Hassan  Date: 3/18/2020  Time: 2:55 PM

## 2020-03-19 NOTE — ANESTHESIA POSTPROCEDURE EVALUATION
Department of Anesthesiology  Postprocedure Note    Patient: Kalyn Pelaez  MRN: [id number]  YOB: 1980  Date of evaluation: 3/19/2020  Time:  2:38 PM     Procedure Summary     Date:  20 Room / Location:  [facility] OR 03 / [facility]    Anesthesia Start:  7687 Anesthesia Stop:  0389    Procedure:  RIGHT CARPAL TUNNEL RELEASE (Right Wrist) Diagnosis:  (RIGHT CARPAL TUNNEL SYNDROME)    Surgeon:  Elizabeth Roper MD Responsible Provider:  Ana Rosa Null MD    Anesthesia Type:  MAC ASA Status:  3          Anesthesia Type: MAC    Chantell Phase I: Chantell Score: 10    Chantell Phase II: Chantell Score: 10    Last vitals: Reviewed and per EMR flowsheets.        Anesthesia Post Evaluation    Patient location during evaluation: PACU  Patient participation: complete - patient participated  Level of consciousness: awake and alert  Airway patency: patent  Nausea & Vomiting: no nausea and no vomiting  Complications: no  Cardiovascular status: hemodynamically stable  Respiratory status: acceptable  Hydration status: euvolemic

## 2020-03-26 RX ORDER — PANTOPRAZOLE SODIUM 40 MG/1
40 TABLET, DELAYED RELEASE ORAL
Qty: 30 TABLET | Refills: 5 | Status: SHIPPED
Start: 2020-03-26 | End: 2020-04-26 | Stop reason: SDUPTHER

## 2020-03-26 RX ORDER — GLIMEPIRIDE 2 MG/1
2 TABLET ORAL
Qty: 30 TABLET | Refills: 5 | Status: SHIPPED
Start: 2020-03-26 | End: 2020-04-26 | Stop reason: SDUPTHER

## 2020-03-30 ENCOUNTER — OFFICE VISIT (OUTPATIENT)
Dept: ORTHOPEDIC SURGERY | Age: 40
End: 2020-03-30
Payer: COMMERCIAL

## 2020-03-30 VITALS
DIASTOLIC BLOOD PRESSURE: 98 MMHG | WEIGHT: 138.01 LBS | HEIGHT: 61 IN | HEART RATE: 87 BPM | RESPIRATION RATE: 18 BRPM | SYSTOLIC BLOOD PRESSURE: 159 MMHG | TEMPERATURE: 98.1 F | BODY MASS INDEX: 26.06 KG/M2

## 2020-03-30 PROCEDURE — G8484 FLU IMMUNIZE NO ADMIN: HCPCS | Performed by: ORTHOPAEDIC SURGERY

## 2020-03-30 PROCEDURE — G8419 CALC BMI OUT NRM PARAM NOF/U: HCPCS | Performed by: ORTHOPAEDIC SURGERY

## 2020-03-30 PROCEDURE — 99213 OFFICE O/P EST LOW 20 MIN: CPT | Performed by: ORTHOPAEDIC SURGERY

## 2020-03-30 PROCEDURE — 1036F TOBACCO NON-USER: CPT | Performed by: ORTHOPAEDIC SURGERY

## 2020-03-30 PROCEDURE — G8427 DOCREV CUR MEDS BY ELIG CLIN: HCPCS | Performed by: ORTHOPAEDIC SURGERY

## 2020-03-30 NOTE — PROGRESS NOTES
Department of Orthopedic Surgery  History and Physical      CHIEF COMPLAINT:  S/p right CTR, left hand numbness and tingling    HISTORY OF PRESENT ILLNESS:                The patient is a 35 y.o. female who presents with right greater than left hand numbness and tingling. Patient states she has right greater than left hand numbness and tingling. Patient states she is working at a new job at [employer]. She states after starting the job in October she had a right greater than left hand numbness and tingling to her thumb index and middle fingers 2 weeks after starting her job. She reports shooting pain up her arm. She states she cannot sleep secondary to pain along with numbness tingling and swelling. Patient states she does wear bracing at night. She states this is not helped her symptoms. She did have an EMG and nerve conduction study test on March 9 of 2020. Right median nerve motor latency was 5.36 and left was 6. 15. Right median nerve sensory latency was non-recordable and left was also nonreproducible. She did have changes to bilateral APBs. This is consistent with bilateral severe CTS. Patient is s/p right CTR. Overall she is doing well. Preoperative symptoms have improved. She is interested in a left CTR. Past Medical History:        Diagnosis Date    Carpal tunnel syndrome, right     Diabetes mellitus ([address]     GERD (gastroesophageal reflux disease)     Hyperlipidemia     Hypothyroidism      Past Surgical History:        Procedure Laterality Date    CARPAL TUNNEL RELEASE Right 3/18/2020    RIGHT CARPAL TUNNEL RELEASE performed by Brayden Mitchell MD at [facility] OR     Current Medications:   No current facility-administered medications for this visit. Allergies:  Iodine and Shellfish-derived products    Social History:   TOBACCO:   reports that she has never smoked. She has never used smokeless tobacco.  ETOH:   reports no history of alcohol use.   DRUGS: reports no history of drug use. ACTIVITIES OF DAILY LIVING:    OCCUPATION:    Family History:   No family history on file. REVIEW OF SYSTEMS:  CONSTITUTIONAL:  negative  EYES:  negative  HEENT:  negative  RESPIRATORY:  negative  CARDIOVASCULAR:  negative  GASTROINTESTINAL:  negative  GENITOURINARY:  negative  INTEGUMENT/BREAST:  negative  HEMATOLOGIC/LYMPHATIC:  negative  ALLERGIC/IMMUNOLOGIC:  negative  ENDOCRINE:  negative  MUSCULOSKELETAL:  pain  NEUROLOGICAL:  N/T  BEHAVIOR/PSYCH:  negative    PHYSICAL EXAM:    VITALS:  BP (!) 159/98 (Site: Left Upper Arm, Position: Sitting)   Pulse 87   Temp 98.1 °F (36.7 °C) (Oral)   Resp 18   Ht 5' 0.98\" (1.549 m)   Wt 138 lb 0.1 oz (62.6 kg)   BMI 26.09 kg/m²   CONSTITUTIONAL:  awake, alert, cooperative, no apparent distress, and appears stated age  EYES:  Lids and lashes normal, pupils equal, round and reactive to light, extra ocular muscles intact, sclera clear, conjunctiva normal  ENT:  Normocephalic, without obvious abnormality, atraumatic, sinuses nontender on palpation, external ears without lesions, oral pharynx with moist mucus membranes, tonsils without erythema or exudates, gums normal and good dentition. NECK:  Supple, symmetrical, trachea midline, no adenopathy, thyroid symmetric, not enlarged and no tenderness, skin normal  LUNGS:  CTA  CARDIOVASCULAR:  2+ radial pulses, extremities warm and well perfused  ABDOMEN:  NTTP  CHEST:  Atraumatic   GENITAL/URINARY:  deferred  NEUROLOGIC:  Awake, alert, oriented to name, place and time. Cranial nerves II-XII are grossly intact. Motor is 5 out of 5 bilaterally. Sensory is intact.  gait is normal.  MUSCULOSKELETAL:    Right upper extremity: incision c/d/i with sutures in place. No erythema or signs of infection. + ecchymosis. Full extension. Stiffness with flexion. + pillar pain. Brisk capillary refill. Otherwise NVI. Left upper extremity: non-tender about the shoulder and elbow with good ROM.  - tinels of the cubital tunnel, + tinels of the carpal tunnel, + durkans, - finkelsteins, - CMC grind, - tenderness over the A1 pulley with no active triggering. Full flexion and and extension of the fingers. Diminished sensation to the thumb, index, and middle finger. - wartenbergs and cross finger testing. APB strength 5/5 with no atrophy. Ulnar, radial n intact to light touch. Brisk capillary refill. Gross motor 5/5. DATA:    CBC:   Lab Results   Component Value Date    WBC 7.9 02/22/2020    RBC 5.35 02/22/2020    HGB 15.3 02/22/2020    HCT 46.4 02/22/2020    MCV 86.7 02/22/2020    MCH 28.6 02/22/2020    MCHC 33.0 02/22/2020    RDW 11.9 02/22/2020     02/22/2020    MPV 12.4 02/22/2020     PT/INR:  No results found for: PROTIME, INR      IMPRESSION:  · S/p right carpal tunnel release  · Left carpal tunnel syndrome    PLAN:  Sutures removed today in the office. Scar management advised. Activity restrictions reviewed with the patient. HEP and ROM exercises demonstrated to the patient. Patient referred to therapy for range of motion exercises. Follow-up in 4 to 6 weeks to assess response of therapy. Due to her good results on the right she would like to proceed with surgical management on the left. Plan for a left carpal tunnel release. I explained the risks, benefits, alternatives and complications of surgery with the patient including but not limited to the risks of infection, possible damage to nerves, vessels, or tendons, stiffness, loss of range of motion, scar sensitivity, wound healing complications, worsening symptoms, possible need for therapy, as well as the possible need further surgery and unanticipated complications. The patient voiced understanding and all questions were answered. The patient elected to proceed with surgical intervention. I have seen and evaluated the patient and agree with the above assessment and plan on today's visit.  I have performed the key components of the history and physical examination with significant findings of right hand postop carpal tunnel release with scar sensitivity and stiffness. Patient is referred to therapy. Plan for left carpal tunnel release when right hand symptoms are improved. . I concur with the findings and plan as documented.     Anupama Barakat MD  3/30/2020

## 2020-04-27 RX ORDER — FLUTICASONE PROPIONATE 50 MCG
2 SPRAY, SUSPENSION (ML) NASAL DAILY
Qty: 1 BOTTLE | Refills: 5 | Status: SHIPPED
Start: 2020-04-27 | End: 2020-06-18 | Stop reason: SDUPTHER

## 2020-04-27 RX ORDER — GLIMEPIRIDE 2 MG/1
2 TABLET ORAL
Qty: 30 TABLET | Refills: 5 | Status: SHIPPED
Start: 2020-04-27 | End: 2020-05-19 | Stop reason: SDUPTHER

## 2020-04-27 RX ORDER — PANTOPRAZOLE SODIUM 40 MG/1
40 TABLET, DELAYED RELEASE ORAL
Qty: 30 TABLET | Refills: 5 | Status: SHIPPED
Start: 2020-04-27 | End: 2020-05-19 | Stop reason: SDUPTHER

## 2020-04-28 ENCOUNTER — OFFICE VISIT (OUTPATIENT)
Dept: ORTHOPEDIC SURGERY | Age: 40
End: 2020-04-28

## 2020-04-28 VITALS — HEIGHT: 61 IN | WEIGHT: 138 LBS | TEMPERATURE: 98.7 F | BODY MASS INDEX: 26.06 KG/M2

## 2020-04-28 PROCEDURE — 99024 POSTOP FOLLOW-UP VISIT: CPT | Performed by: ORTHOPAEDIC SURGERY

## 2020-04-30 ENCOUNTER — EVALUATION (OUTPATIENT)
Dept: OCCUPATIONAL THERAPY | Age: 40
End: 2020-04-30
Payer: COMMERCIAL

## 2020-04-30 PROCEDURE — 97110 THERAPEUTIC EXERCISES: CPT | Performed by: OCCUPATIONAL THERAPIST

## 2020-04-30 PROCEDURE — 97165 OT EVAL LOW COMPLEX 30 MIN: CPT | Performed by: OCCUPATIONAL THERAPIST

## 2020-05-05 ENCOUNTER — TREATMENT (OUTPATIENT)
Dept: OCCUPATIONAL THERAPY | Age: 40
End: 2020-05-05
Payer: COMMERCIAL

## 2020-05-05 PROCEDURE — 97110 THERAPEUTIC EXERCISES: CPT | Performed by: OCCUPATIONAL THERAPIST

## 2020-05-05 PROCEDURE — 97035 APP MDLTY 1+ULTRASOUND EA 15: CPT | Performed by: OCCUPATIONAL THERAPIST

## 2020-05-05 PROCEDURE — 97140 MANUAL THERAPY 1/> REGIONS: CPT | Performed by: OCCUPATIONAL THERAPIST

## 2020-05-05 NOTE — PROGRESS NOTES
OCCUPATIONAL THERAPY PROGRESS NOTE    Date:  2020                                      Initial Evaluation Date: 2020    Patient Name:  Isabel Nation    :  1980    Restrictions/Precautions: Right hand ROM as tolerated, modalities and edema control prn , low fall risk  Diagnosis:G56.03 (ICD-10-CM) - Bilateral carpal tunnel syndrome                                                              Insurance/Certification information: Medical mutual   Referring Practitioner:  Dr. Jihan Quiñones  Date of Surgery/Injury: 2020  Plan of care signed (Y/N):  N  Visit# / total visits:     Pain Level: rest low 10. With activity 3-5/10. Subjective: \"My wrist still hurts - is that normal?.\"     Objective:  Updated POC to be completed by 2020. INTERVENTION: COMPLETED: SPECIFICS/COMMENTS:   Modality:     MHP x 5 min for soft tissue warm up   US X 7 min. To CT scar area - 3.3 MHz, 50% at .o8 intensity   AROM:     Digital tendon gliding x Going back to ext between each position - to review again next session. Median nerve glide x 3 rep's each  - to review next session again. Did 2 - one with hand only and only with arm abducted fingers open and closed             AAROM:               PROM/Stretching:     R wrist all planes               Scar Mass/Edema Control:     Scar and retrograde  x To R wrist/ digits. CT scar with wrist and thumb ext stretch   Gel scar pad X  Given today to wear at night with tubigrip compression sleeve size D        Strengthening:               Other:     HEP x Cont with tendon gliding, med nerve glides, and scar managment          Assessment/Comments: Pt is making Good progress toward stated plan of care.    -Rehab Potential: Good  -Requires OT Follow Up: Yes  Time In: 3:00 pm            Time Out: 4:00 pm              Visit #: 2    Treatment Charges: Mins Units   Modalities- US 7 1   Ther Exercise 15 1   Manual Therapy 25 2   Thera Activities     ADL/Home Mgt Neuro Re-education     Gait Training     Group Therapy     Non-Billable Service Time-[facility] 5 0   Other     Total Time/Units 60 4       -Response to Treatment: Pt concerned with her scar and wrist pain but therapist assured her that was normal.   Goals: Goals for pt can be see on initial eval occurring on 4/30/2020    Plan:   [x]  Continues Plan of care: Treatment covered based on POC and graduated to patient's progress. Pt education continues at each visit to obtain maximum benefits from skilled OT intervention.   []  Alter Plan of care:   []  Discharge:      Ishmael Campos OT/L, CHT  OT  [id number]

## 2020-05-07 ENCOUNTER — TREATMENT (OUTPATIENT)
Dept: OCCUPATIONAL THERAPY | Age: 40
End: 2020-05-07
Payer: COMMERCIAL

## 2020-05-07 PROCEDURE — 97110 THERAPEUTIC EXERCISES: CPT | Performed by: OCCUPATIONAL THERAPIST

## 2020-05-07 PROCEDURE — 97035 APP MDLTY 1+ULTRASOUND EA 15: CPT | Performed by: OCCUPATIONAL THERAPIST

## 2020-05-07 PROCEDURE — 97140 MANUAL THERAPY 1/> REGIONS: CPT | Performed by: OCCUPATIONAL THERAPIST

## 2020-05-07 NOTE — PROGRESS NOTES
OCCUPATIONAL THERAPY PROGRESS NOTE    Date:  2020                                      Initial Evaluation Date: 2020    Patient Name:  Sonia Monday    :  1980    Restrictions/Precautions: Right hand ROM as tolerated, modalities and edema control prn , low fall risk  Diagnosis:G56.03 (ICD-10-CM) - Bilateral carpal tunnel syndrome                                                              Insurance/Certification information: Medical mutual   Referring Practitioner:  Dr. Toyin Diaz  Date of Surgery/Injury: 2020  Plan of care signed (Y/N):  N  Visit# / total visits:     Pain Level: rest low 10. With activity 3-5/10. Subjective: Pt states - \"My thumb gets tired and gets spasms sometimes. \"     Objective:  Updated POC to be completed by 2020. INTERVENTION: COMPLETED: SPECIFICS/COMMENTS:   Modality:     MHP x 5 min for soft tissue warm up   US X 7 min. To CT scar area - 3.3 MHz, 50% at .o8 intensity   AROM:     Digital tendon gliding x Going back to ext between each position - to review again next session. Median nerve glide x 3 rep's each  - to review next session again. Did 2 - one with hand only and only with arm abducted fingers open and closed   Flat fist one finger at a time. x 5 rep's - added today . Doing flat fist with one finger at time - SF and RF together and IF and MF alone    AROM X- 10 rep's  Wrist over incline        AAROM:               PROM/Stretching:     R wrist all planes x              Scar Mass/Edema Control:     Scar and retrograde  x To R wrist/ digits. CT scar with wrist and thumb ext stretch   Gel scar pad X   to wear at night with tubigrip compression sleeve size D- added a hand based splint to wear with it to ^ pressure on the scar. Strengthening:               Other:     HEP x Cont with tendon gliding, med nerve glides, and scar management and gel pad/ splint  for night .           Assessment/Comments: Pt is making Good progress toward stated plan of care. -Rehab Potential: Good  -Requires OT Follow Up: Yes  Time In: 2:00 pm            Time Out: 3:00 pm              Visit #: 3    Treatment Charges: Mins Units   Modalities- US 7 1   Ther Exercise 15 1   Manual Therapy 25 2   Thera Activities     ADL/Home Mgt      Neuro Re-education     Gait Training     Group Therapy     Non-Billable Service Time-[facility] 5 0   Other     Total Time/Units 60 4       -Response to Treatment: Pt concerned with her scar and wrist pain but therapist assured her that was normal. Therapist assured her - her thumb would get stronger and not get spasms as much as she gets stronger. .   Goals: Goals for pt can be see on initial eval occurring on 4/30/2020    Plan:   [x]  Continues Plan of care: Treatment covered based on POC and graduated to patient's progress. Pt education continues at each visit to obtain maximum benefits from skilled OT intervention.   []  Alter Plan of care:   []  Discharge:      Isabella Flor OT/L, CHT  OT  82

## 2020-05-12 ENCOUNTER — TREATMENT (OUTPATIENT)
Dept: OCCUPATIONAL THERAPY | Age: 40
End: 2020-05-12
Payer: COMMERCIAL

## 2020-05-12 PROCEDURE — 97110 THERAPEUTIC EXERCISES: CPT | Performed by: OCCUPATIONAL THERAPIST

## 2020-05-12 PROCEDURE — 97022 WHIRLPOOL THERAPY: CPT | Performed by: OCCUPATIONAL THERAPIST

## 2020-05-12 PROCEDURE — 97140 MANUAL THERAPY 1/> REGIONS: CPT | Performed by: OCCUPATIONAL THERAPIST

## 2020-05-12 NOTE — PROGRESS NOTES
OCCUPATIONAL THERAPY PROGRESS NOTE    Date:  2020                                      Initial Evaluation Date: 2020    Patient Name:  Marilyn Campbell    :  1980    Restrictions/Precautions: Right hand ROM as tolerated, modalities and edema control prn , low fall risk  Diagnosis:G56.03 (ICD-10-CM) - Bilateral carpal tunnel syndrome                                                              Insurance/Certification information: Medical mutual   Referring Practitioner:  Dr. Charlotte Dunn  Date of Surgery/Injury: 2020  Plan of care signed (Y/N):  N  Visit# / total visits:     Pain Level: rest low 0/10. With activity 3-5/10. Subjective: Pt states the pain is getting a little better. Objective:  Updated POC to be completed by 2020. INTERVENTION: COMPLETED: SPECIFICS/COMMENTS:   Modality:     Fluidotherapy x 15 mins to R hand with AROM encouraged throughout. MHP  5 min for soft tissue warm up   US  To CT scar area - 3.3 MHz, 50% at .o8 intensity   AROM:     Digital tendon gliding x Going back to ext between each position - 2 sets x15   Median nerve glide x 2 sets x10. Did 2 - one with hand only and only with arm abducted fingers open and closed   Flat fist one finger at a time. x 2 sets x10 . Doing flat fist with one finger at time - SF and RF together and IF and MF alone   Wristiziser x x5 with elbow on table to isolate wrist motions. Towel scrunches x 5 mins using RUE to complete. AAROM:               PROM/Stretching:     R wrist all planes x    Putty press x To assist with stretching palm and decreasing scar tissue to press and push into xsoft putty with good tolerance. x10        Scar Mass/Edema Control:     Scar and retrograde  x To R wrist/ digits. CT scar with wrist and thumb ext stretch   Gel scar pad   to wear at night with tubigrip compression sleeve size D- added a hand based splint to wear with it to ^ pressure on the scar.          Strengthening:

## 2020-05-14 ENCOUNTER — TREATMENT (OUTPATIENT)
Dept: OCCUPATIONAL THERAPY | Age: 40
End: 2020-05-14
Payer: COMMERCIAL

## 2020-05-14 PROCEDURE — 97140 MANUAL THERAPY 1/> REGIONS: CPT | Performed by: OCCUPATIONAL THERAPIST

## 2020-05-14 PROCEDURE — 97110 THERAPEUTIC EXERCISES: CPT | Performed by: OCCUPATIONAL THERAPIST

## 2020-05-14 PROCEDURE — 97022 WHIRLPOOL THERAPY: CPT | Performed by: OCCUPATIONAL THERAPIST

## 2020-05-19 ENCOUNTER — TREATMENT (OUTPATIENT)
Dept: OCCUPATIONAL THERAPY | Age: 40
End: 2020-05-19
Payer: COMMERCIAL

## 2020-05-19 PROCEDURE — 97110 THERAPEUTIC EXERCISES: CPT | Performed by: OCCUPATIONAL THERAPIST

## 2020-05-19 PROCEDURE — 97022 WHIRLPOOL THERAPY: CPT | Performed by: OCCUPATIONAL THERAPIST

## 2020-05-19 PROCEDURE — 97140 MANUAL THERAPY 1/> REGIONS: CPT | Performed by: OCCUPATIONAL THERAPIST

## 2020-05-19 RX ORDER — PANTOPRAZOLE SODIUM 40 MG/1
40 TABLET, DELAYED RELEASE ORAL
Qty: 30 TABLET | Refills: 5 | Status: SHIPPED
Start: 2020-05-19 | End: 2020-06-18 | Stop reason: SDUPTHER

## 2020-05-19 RX ORDER — GLIMEPIRIDE 2 MG/1
2 TABLET ORAL
Qty: 30 TABLET | Refills: 5 | Status: SHIPPED
Start: 2020-05-19 | End: 2020-06-18 | Stop reason: SDUPTHER

## 2020-05-19 NOTE — PROGRESS NOTES
OCCUPATIONAL THERAPY PROGRESS NOTE    Date:  2020                                      Initial Evaluation Date: 2020    Patient Name:  Kashmir Alas    :  1980    Restrictions/Precautions: Right hand ROM as tolerated, modalities and edema control prn , low fall risk  Diagnosis:G56.03 (ICD-10-CM) - Bilateral carpal tunnel syndrome                                                              Insurance/Certification information: Medical mutual   Referring Practitioner:  Dr. Kitty Marley  Date of Surgery/Injury: 2020   ( 8 weeks post op)  Plan of care signed (Y/N):  N  Visit# / total visits:      Pain Level: rest low 0/10. With activity 3-4/10. Subjective: Pt states No New complaints. \"      Objective:  Updated POC to be completed by 2020. INTERVENTION: COMPLETED: SPECIFICS/COMMENTS:   Modality:     Fluidotherapy x 10 mins to R hand with AROM encouraged throughout. MHP  5 min for soft tissue warm up   US x To CT scar area - 3.3 MHz, 50% at .o8 intensity   AROM:     Digital tendon gliding  Going back to ext between each position - 2 sets x15   Median nerve glide x 2 sets x10. Did 2 - one with hand only and only with arm abducted fingers open and closed   Flat fist one finger at a time. x 2 sets x10 . Doing flat fist with one finger at time - SF and RF together and IF and MF alone   Wristiziser  x5 with elbow on table to isolate wrist motions. Towel scrunches  5 mins using RUE to complete. AAROM:               PROM/Stretching:     R wrist all planes x    Putty press  To assist with stretching palm and decreasing scar tissue to press and push into xsoft putty with good tolerance. x10        Scar Mass/Edema Control:     Scar and retrograde  x To R wrist/ digits. CT scar with wrist and thumb ext stretch   Green therabar X 5 min. Roll onto scar to stretch thenar /hypothenar area.     Gel scar pad x  to wear at night with tubigrip compression sleeve size D- added a hand

## 2020-05-21 ENCOUNTER — TREATMENT (OUTPATIENT)
Dept: OCCUPATIONAL THERAPY | Age: 40
End: 2020-05-21
Payer: COMMERCIAL

## 2020-05-21 PROCEDURE — 97140 MANUAL THERAPY 1/> REGIONS: CPT | Performed by: OCCUPATIONAL THERAPIST

## 2020-05-21 PROCEDURE — 97110 THERAPEUTIC EXERCISES: CPT | Performed by: OCCUPATIONAL THERAPIST

## 2020-05-21 PROCEDURE — 97022 WHIRLPOOL THERAPY: CPT | Performed by: OCCUPATIONAL THERAPIST

## 2020-05-21 NOTE — PROGRESS NOTES
OCCUPATIONAL THERAPY PROGRESS NOTE    Date:  2020                                      Initial Evaluation Date: 2020    Patient Name:  Robert Moore    :  1980    Restrictions/Precautions: Right hand ROM as tolerated, modalities and edema control prn , low fall risk  Diagnosis:G56.03 (ICD-10-CM) - Bilateral carpal tunnel syndrome                                                              Insurance/Certification information: Medical mutual   Referring Practitioner:  Dr. Patrick Slaughter  Date of Surgery/Injury: 2020   ( 8 weeks post op)  Plan of care signed (Y/N):  N  Visit# / total visits:      Pain Level: rest low 0/10. With activity 3-4/10. Subjective: Pt states  \" I think I am ready to go back to work next week - I hope I can get through the day. \"      Objective:  Updated POC to be completed by 2020. INTERVENTION: COMPLETED: SPECIFICS/COMMENTS:   Modality:     Fluidotherapy x 10 mins to R hand with AROM encouraged throughout. MHP  5 min for soft tissue warm up   US x To CT scar area - 3.3 MHz, 50% at .o8 intensity   AROM:     Digital tendon gliding x Going back to ext between each position - 2 sets x15   Median nerve glide  2 sets x10. Did 2 - one with hand only and only with arm abducted fingers open and closed   Flat fist one finger at a time. x 2 sets x10 . Doing flat fist with one finger at time - SF and RF together and IF and MF alone   Wristiziser  x5 with elbow on table to isolate wrist motions. Small pegs x Doing alternately opposing fingers to thumb   Towel scrunches  5 mins using RUE to complete. AAROM:               PROM/Stretching:     R wrist all planes x During scar massage   Putty press  To assist with stretching palm and decreasing scar tissue to press and push into xsoft putty with good tolerance. x10        Scar Mass/Edema Control:     Scar and retrograde  x To R wrist/ digits.  CT scar with wrist and thumb ext stretch   Green therabar X 5 min.  Roll onto scar to stretch thenar /hypothenar area. Gel scar pad x  to wear at night with tubigrip compression sleeve size D- added a hand based splint to wear with it to ^ pressure on the scar. Strengthenin# wt  20 rep's  Wrist ext, wrist flexion and RD - UD    Yellow putty x Gripping 3 min., roll and 3 pt pinch x;s 2, palm press x's 10 - to do at home. Yellow therabar X 15 rep's  Bend down, twist and hold L bend forward R   Yellow extender X 2 sets of 15 reps; Other:     HEP x Cont with tendon gliding, med nerve glides, and scar management gel pad/ splint  for night .easy putty for home use. Kinesio Tape x Completed carpal tunnel taping to assist with pain management ( 1 I strip paper off tension laterally over scar and I strip 50% tension over carpal tunnel site and wrapped around wrist. Only stayed on about 10 hrs but wanted to try again this date. Assessment/Comments: Pt is making Good progress toward stated plan of care. Pt tolerated session well with no ^ in pain after strengthening. Therapist instructed her to try icing the thenar/ scar area at lunch when returning to work and at the end of the day - if needed. Will progress as tolerated. -Rehab Potential: Good  -Requires OT Follow Up: Yes  Time In:  2:00 pm            Time Out: 3:00 pm              Visit #: 7    Treatment Charges: Mins Units   Modalities- Fluido 12 1   Ther Exercise 25 2   Manual Therapy 15 1   Thera Activities     ADL/Home Mgt      Neuro Re-education     Gait Training     Group Therapy     Non-Billable Service Time-[facility]     Other  7    Total Time/Units 60 4       -Response to Treatment: Pt responded well to treatment this date. No ^ in pain with strengthening. Pt wants to go back to work but is a little concerned about ^'in her pain.    Goals: Goals for pt can be see on initial eval occurring on 2020    Plan:   [x]  Continues Plan of care: Treatment covered based on POC and graduated to

## 2020-05-26 ENCOUNTER — TREATMENT (OUTPATIENT)
Dept: OCCUPATIONAL THERAPY | Age: 40
End: 2020-05-26
Payer: COMMERCIAL

## 2020-05-26 PROCEDURE — 97140 MANUAL THERAPY 1/> REGIONS: CPT | Performed by: OCCUPATIONAL THERAPIST

## 2020-05-26 PROCEDURE — 97110 THERAPEUTIC EXERCISES: CPT | Performed by: OCCUPATIONAL THERAPIST

## 2020-05-26 PROCEDURE — 97022 WHIRLPOOL THERAPY: CPT | Performed by: OCCUPATIONAL THERAPIST

## 2020-05-26 NOTE — PROGRESS NOTES
/hypothenar area. Gel scar pad   to wear at night with tubigrip compression sleeve size D- added a hand based splint to wear with it to ^ pressure on the scar. Putty press x x15 in a mixture of soft and xsoft putty. Pushing down and forward. Strengthenin# wt  20 rep's  Wrist ext, wrist flexion and RD - UD    Yellow putty  Gripping 3 min., roll and 3 pt pinch x;s 2, palm press x's 10 - to do at home. Red therabar X 15 rep's  Bend down, twist and hold L bend forward R   Red extender X 2 sets of 15 reps; Other:     HEP  Cont with tendon gliding, med nerve glides, and scar management gel pad/ splint  for night .easy putty for home use. Kinesio Tape x Completed carpal tunnel taping to assist with pain management ( 1 I strip paper off tension laterally over scar and I strip 50% tension over carpal tunnel site and wrapped around wrist. Only stayed on about 10 hrs but wanted to try again this date. Assessment/Comments: Pt is making Good progress toward stated plan of care. Pt noted to have slightly increased edema and tg compression size D was issued to wear at work to assist with edema management. Pt tolerated increased resistance during strengthening and was encouraged to continue with modalities. -Rehab Potential: Good  -Requires OT Follow Up: Yes  Time In:  4:00 pm            Time Out: 5:00 pm              Visit #: 8    Treatment Charges: Mins Units   Modalities- Fluido 10 1   Ther Exercise 30 2   Manual Therapy 20 1   Thera Activities     ADL/Home Mgt      Neuro Re-education     Gait Training     Group Therapy     Non-Billable Service Time-[facility]     Other US     Total Time/Units 60 4       -Response to Treatment: Pt responded well to treatment this date. No ^ in pain with strengthening. Pt wants to go back to work but is a little concerned about ^'in her pain.    Goals: Goals for pt can be see on initial eval occurring on 2020    Plan:   [x]  Continues Plan of care: Treatment

## 2020-05-28 ENCOUNTER — TREATMENT (OUTPATIENT)
Dept: OCCUPATIONAL THERAPY | Age: 40
End: 2020-05-28
Payer: COMMERCIAL

## 2020-05-28 PROCEDURE — 97140 MANUAL THERAPY 1/> REGIONS: CPT | Performed by: OCCUPATIONAL THERAPIST

## 2020-05-28 PROCEDURE — 97110 THERAPEUTIC EXERCISES: CPT | Performed by: OCCUPATIONAL THERAPIST

## 2020-05-28 PROCEDURE — 97022 WHIRLPOOL THERAPY: CPT | Performed by: OCCUPATIONAL THERAPIST

## 2020-05-28 NOTE — PROGRESS NOTES
and thumb ext stretch   Red therabar x Roll onto scar to stretch thenar /hypothenar area. Gel scar pad   to wear at night with tubigrip compression sleeve size D- added a hand based splint to wear with it to ^ pressure on the scar. Putty press  x15 in a mixture of soft and xsoft putty. Pushing down and forward. Strengthenin# wt  20 rep's  Wrist ext, wrist flexion and RD - UD    xsoft and soft putty x Gripping 3 min., roll and 3 pt pinch x;s 2, palm press x's 10    Red therabar X 15 rep's  Bend down, twist and hold L bend forward R   Red extender          Other:     HEP  Cont with tendon gliding, med nerve glides, and scar management gel pad/ splint  for night .easy putty for home use. Kinesio Tape  Completed carpal tunnel taping to assist with pain management ( 1 I strip paper off tension laterally over scar and I strip 50% tension over carpal tunnel site and wrapped around wrist. Only stayed on about 10 hrs but wanted to try again this date. Assessment/Comments: Pt is making Good progress toward stated plan of care. Increased putty resistance during exercises with good tolerance. Pt tolerated increased weight for wrist strengthening. Pt continues to report edema issues due to activities at work however compression that was administered did assist with edema.     -Rehab Potential: Good  -Requires OT Follow Up: Yes  Time In:  4:00 pm            Time Out: 4:50 pm              Visit #: 9    Treatment Charges: Mins Units   Modalities- Fluido 10 1   Ther Exercise 20 1   Manual Therapy 20 1   Thera Activities     ADL/Home Mgt      Neuro Re-education     Gait Training     Group Therapy     Non-Billable Service Time-[facility]     Other US     Total Time/Units 50 3       -Response to Treatment: Pt responded well to treatment this date. No ^ in pain with strengthening. Pt wants to go back to work but is a little concerned about ^'in her pain.    Goals: Goals for pt can be see on initial eval occurring on 4/30/2020    Plan:   [x]  Continues Plan of care: Treatment covered based on POC and graduated to patient's progress. Pt education continues at each visit to obtain maximum benefits from skilled OT intervention.   []  Alter Plan of care:   []  Discharge:      Jeanette Michael Tommie 86, OTR/L #[id number]

## 2020-06-02 ENCOUNTER — TREATMENT (OUTPATIENT)
Dept: OCCUPATIONAL THERAPY | Age: 40
End: 2020-06-02
Payer: COMMERCIAL

## 2020-06-02 PROCEDURE — 97110 THERAPEUTIC EXERCISES: CPT | Performed by: OCCUPATIONAL THERAPIST

## 2020-06-02 PROCEDURE — 97022 WHIRLPOOL THERAPY: CPT | Performed by: OCCUPATIONAL THERAPIST

## 2020-06-02 PROCEDURE — 97140 MANUAL THERAPY 1/> REGIONS: CPT | Performed by: OCCUPATIONAL THERAPIST

## 2020-06-02 NOTE — PROGRESS NOTES
sec holds end range. Scar Mass/Edema Control:     Scar and retrograde  x To R wrist/ digits. CT scar with wrist and thumb ext stretch   Red therabar x Roll onto scar to stretch thenar /hypothenar area. Gel scar pad   to wear at night with tubigrip compression sleeve size D- added a hand based splint to wear with it to ^ pressure on the scar. Putty press  x15 in a mixture of soft and xsoft putty. Pushing down and forward. Strengthenin# wt X - 20 rep's  Wrist ext, wrist flexion and RD - UD    xsoft and soft putty  Gripping 3 min., roll and 3 pt pinch x;s 2, palm press x's 10    Red therabar X 15 rep's  Bend down, twist and hold L bend forward R   Red extender X- 20 rep's          Other:     HEP  Cont with tendon gliding, med nerve glides, and scar management gel pad/ splint  for night .easy putty for home use. Icing - to do 2-3 x's a day. Kinesio Tape  Completed carpal tunnel taping to assist with pain management ( 1 I strip paper off tension laterally over scar and I strip 50% tension over carpal tunnel site and wrapped around wrist. Only stayed on about 10 hrs but wanted to try again this date. Assessment/Comments: Pt is making Good progress toward stated plan of care. Therapist did some strengthening and also moblility exercises today 2* to pt c/o ^'ed wrist pain ulnarly. ,. she is going to try icing 2-3 x's a day to try to decrease wrist pain. Pt continues to report edema issues due to activities at work however compression that was administered did assist with edema- therapist doubled around wrist this date. .     -Rehab Potential: Good  -Requires OT Follow Up: Yes  Time In:  3:00 pm            Time Out: 4:00 pm              Visit #: 10    Treatment Charges: Mins Units   Modalities- Fluido 15 1   Ther Exercise 30 2   Manual Therapy 10 1   Thera Activities     ADL/Home Mgt      Neuro Re-education     Gait Training     Group Therapy     Non-Billable Service Time-ice 5 0   Other US Total Time/Units 60 4       -Response to Treatment: Pt responded fair to treatment this date, slight  ^ in pain with strengthening. Pt is tolerating work however is showing some ^'ed pain and edema. Continue with compression sleeve and icing. Goals: Goals for pt can be see on initial eval occurring on 4/30/2020    Plan:   [x]  Continues Plan of care: Treatment covered based on POC and graduated to patient's progress. Pt education continues at each visit to obtain maximum benefits from skilled OT intervention.   []  Alter Plan of care:   []  Discharge:      Bishop Dowell OT/L, CHT

## 2020-06-04 ENCOUNTER — TREATMENT (OUTPATIENT)
Dept: OCCUPATIONAL THERAPY | Age: 40
End: 2020-06-04
Payer: COMMERCIAL

## 2020-06-04 PROCEDURE — 97022 WHIRLPOOL THERAPY: CPT | Performed by: OCCUPATIONAL THERAPIST

## 2020-06-04 PROCEDURE — 97140 MANUAL THERAPY 1/> REGIONS: CPT | Performed by: OCCUPATIONAL THERAPIST

## 2020-06-04 PROCEDURE — 97110 THERAPEUTIC EXERCISES: CPT | Performed by: OCCUPATIONAL THERAPIST

## 2020-06-04 NOTE — PROGRESS NOTES
Wrist ext table top stretch X 5 rep's  With 15 sec holds end range. Scar Mass/Edema Control:     Scar and retrograde  x To R wrist/ digits. CT scar with wrist and thumb ext stretch   yellow therabar x Roll onto scar to stretch thenar /hypothenar area. Gel scar pad   to wear at night with tubigrip compression sleeve size D- added a hand based splint to wear with it to ^ pressure on the scar. Putty press  x15 in a mixture of soft and xsoft putty. Pushing down and forward. Strengthenin# wt X - 20 rep's  Wrist ext, wrist flexion and RD - UD    xsoft and soft putty  Gripping 3 min., roll and 3 pt pinch x;s 2, palm press x's 10    Hand gripper x x15 yellow spring at 20# with fair tolerance   Red therabar X 15 rep's  Bend down, twist and hold L bend forward R   Red extender X- 20 rep's          Other:     HEP  Cont with tendon gliding, med nerve glides, and scar management gel pad/ splint  for night .easy putty for home use. Icing - to do 2-3 x's a day. Kinesio Tape  Completed carpal tunnel taping to assist with pain management ( 1 I strip paper off tension laterally over scar and I strip 50% tension over carpal tunnel site and wrapped around wrist. Only stayed on about 10 hrs but wanted to try again this date. Assessment/Comments: Pt is making Good progress toward stated plan of care. Pt continues to report pain in near incision after working. Pt demonstrated slight edema around area however at end of treatment edema did decrease. Pt was able to tolerate strengthening well wit no pain reported.  Pt will be re-assessed for discharge next session.     -Rehab Potential: Good  -Requires OT Follow Up: Yes  Time In:  3:00 pm            Time Out: 4:00 pm              Visit #: 11    Treatment Charges: Mins Units   Modalities- Fluido 15 1   Ther Exercise 30 2   Manual Therapy 10 1   Thera Activities     ADL/Home Mgt      Neuro Re-education     Gait Training     Group Therapy     Non-Billable Service

## 2020-06-09 ENCOUNTER — TREATMENT (OUTPATIENT)
Dept: OCCUPATIONAL THERAPY | Age: 40
End: 2020-06-09
Payer: COMMERCIAL

## 2020-06-09 PROCEDURE — 97022 WHIRLPOOL THERAPY: CPT | Performed by: OCCUPATIONAL THERAPIST

## 2020-06-09 PROCEDURE — 97110 THERAPEUTIC EXERCISES: CPT | Performed by: OCCUPATIONAL THERAPIST

## 2020-06-09 PROCEDURE — 97140 MANUAL THERAPY 1/> REGIONS: CPT | Performed by: OCCUPATIONAL THERAPIST

## 2020-06-09 NOTE — PROGRESS NOTES
OCCUPATIONAL THERAPY PROGRESS NOTE    Date:  2020                                      Initial Evaluation Date: 2020    Patient Name:  Delvis Shah    :  1980    Restrictions/Precautions: Right hand ROM as tolerated, modalities and edema control prn , low fall risk  Diagnosis:G56.03 (ICD-10-CM) - Bilateral carpal tunnel syndrome                                                              Insurance/Certification information: Medical mutual   Referring Practitioner:  Dr. Dulce Maria Berkowitz  Date of Surgery/Injury: 2020   (10 weeks post op)  Plan of care signed (Y/N):  N  Visit# / total visits:      Pain Level: 3-4/10 on and off with a sharp pain around the wrist. At the end of the work day ^'es to 5-6/10. Subjective: Pt states \"My R hand is getting ^'ed pain at work when I do heave lifting, or use my thumb too much for pinching and when I do the jobs that vibrate my hand as I hold onto the tool. I called the  To get an appointment and to ask about him writing a note for me to do light duty. \"      OMARZ-[id number] Screening completed upon entering facility and patient cleared for treatment today. Pt followed all protocols set forth by [address] [place] [address] for Outpatient services throughout therapy session. Patient has been made aware of all new hygiene protocols due to COVID-19 set forth by [address] [place] [address] Outpatient services and agrees to abide by all protocols. Objective:  Updated POC to be completed by last visit. INTERVENTION: COMPLETED: SPECIFICS/COMMENTS:   Modality:     Fluidotherapy x 15 mins to R hand with AROM encouraged throughout. Ice   5 min end of session to decrease pain. Doing at home after work. MHP  5 min for soft tissue warm up   US x To CT scar area - 3.3 MHz, 50% at .o8 intensity for 8 mins   AROM:     Digital tendon gliding x Going back to ext between each position - 2 sets x15   Median nerve glide  2 sets x10.  Did 2 - one with hand only and and wrapped around wrist. Used again this date. Also did on on thumb thenar to decrease edema. Assessment/Comments: Pt is making Good progress toward stated plan of care. 2* to pt's ^'ed pain in her CTscar area and forearm in her R hand therapist spent more time with AROM activities instead of strenthening and thenar/ pillar stretching. Pt was doing better 2 weeks ago but since returning to work she has had ^'ed pain. She has a Dr. Amanda Fournier next week and wants to continue with therapy another 2 -3 weeks. Therapist agrees and she is on the schedule.       -Rehab Potential: Good  -Requires OT Follow Up: Yes  Time In:  9:00 am            Time Out:   10:00 am             Visit #: 11    Treatment Charges: Mins Units   Modalities- Fluido 15 1   Ther Exercise 25 2   Manual Therapy 15 1   Thera Activities     ADL/Home Mgt      Neuro Re-education     Gait Training     Group Therapy     Non-Billable Service Time-ice 5 0   Other US     Total Time/Units 60 4       -Response to Treatment: Pt responded fair to treatment this date will continue with pain reduction techniques at home and tried kinesiotaping aging this date. Goals: Goals for pt can be see on initial eval occurring on 4/30/2020    Plan:   [x]  Continues Plan of care: Treatment covered based on POC and graduated to patient's progress. Pt education continues at each visit to obtain maximum benefits from skilled OT intervention.   []  Alter Plan of care:   []  Discharge:      Elbert Cortez OT/L, CHT

## 2020-06-15 ENCOUNTER — TREATMENT (OUTPATIENT)
Dept: OCCUPATIONAL THERAPY | Age: 40
End: 2020-06-15
Payer: COMMERCIAL

## 2020-06-15 ENCOUNTER — OFFICE VISIT (OUTPATIENT)
Dept: ORTHOPEDIC SURGERY | Age: 40
End: 2020-06-15

## 2020-06-15 VITALS — HEIGHT: 61 IN | TEMPERATURE: 96.7 F | WEIGHT: 138 LBS | BODY MASS INDEX: 26.06 KG/M2

## 2020-06-15 PROCEDURE — 97022 WHIRLPOOL THERAPY: CPT | Performed by: OCCUPATIONAL THERAPIST

## 2020-06-15 PROCEDURE — 97110 THERAPEUTIC EXERCISES: CPT | Performed by: OCCUPATIONAL THERAPIST

## 2020-06-15 PROCEDURE — 97140 MANUAL THERAPY 1/> REGIONS: CPT | Performed by: OCCUPATIONAL THERAPIST

## 2020-06-15 PROCEDURE — 99024 POSTOP FOLLOW-UP VISIT: CPT | Performed by: ORTHOPAEDIC SURGERY

## 2020-06-15 RX ORDER — METHYLPREDNISOLONE 4 MG/1
2 TABLET ORAL DAILY
Qty: 3 TABLET | Refills: 0 | Status: SHIPPED | OUTPATIENT
Start: 2020-06-15 | End: 2020-06-21

## 2020-06-15 RX ORDER — IBUPROFEN 600 MG/1
600 TABLET ORAL 3 TIMES DAILY PRN
Qty: 90 TABLET | Refills: 0 | Status: SHIPPED | OUTPATIENT
Start: 2020-06-15

## 2020-06-18 ENCOUNTER — TREATMENT (OUTPATIENT)
Dept: OCCUPATIONAL THERAPY | Age: 40
End: 2020-06-18
Payer: COMMERCIAL

## 2020-06-18 PROCEDURE — 97022 WHIRLPOOL THERAPY: CPT | Performed by: OCCUPATIONAL THERAPIST

## 2020-06-18 PROCEDURE — 97140 MANUAL THERAPY 1/> REGIONS: CPT | Performed by: OCCUPATIONAL THERAPIST

## 2020-06-18 PROCEDURE — 97110 THERAPEUTIC EXERCISES: CPT | Performed by: OCCUPATIONAL THERAPIST

## 2020-06-18 RX ORDER — FLUTICASONE PROPIONATE 50 MCG
2 SPRAY, SUSPENSION (ML) NASAL DAILY
Qty: 1 BOTTLE | Refills: 5 | Status: SHIPPED
Start: 2020-06-18 | End: 2020-09-11 | Stop reason: SDUPTHER

## 2020-06-18 RX ORDER — PANTOPRAZOLE SODIUM 40 MG/1
40 TABLET, DELAYED RELEASE ORAL
Qty: 30 TABLET | Refills: 5 | Status: SHIPPED
Start: 2020-06-18 | End: 2020-07-31 | Stop reason: SDUPTHER

## 2020-06-18 RX ORDER — ATORVASTATIN CALCIUM 20 MG/1
20 TABLET, FILM COATED ORAL DAILY
Qty: 30 TABLET | Refills: 5 | Status: SHIPPED
Start: 2020-06-18 | End: 2020-11-06 | Stop reason: SDUPTHER

## 2020-06-18 RX ORDER — GLIMEPIRIDE 2 MG/1
2 TABLET ORAL
Qty: 30 TABLET | Refills: 5 | Status: SHIPPED
Start: 2020-06-18 | End: 2020-06-26

## 2020-06-18 NOTE — PROGRESS NOTES
in thenar eminence and edema which subsides by the end of the session. Pt tolerated all activities without pain reported. -Rehab Potential: Good  -Requires OT Follow Up: Yes  Time In:  3:05 pm            Time Out:   4:00 pm             Visit #: 1/5    Treatment Charges: Mins Units   Modalities- Fluido 15 1   Ther Exercise 20 2   Manual Therapy 20 1   Thera Activities     ADL/Home Mgt      Neuro Re-education     Gait Training     Group Therapy     Non-Billable Service Time-ice     Other US     Total Time/Units 55 4       -Response to Treatment: Pt responded fair to treatment this date will continue with pain reduction techniques at home and tried kinesiotaping aging this date. Goals: Goals for pt can be see on initial eval occurring on 4/30/2020    Plan:   [x]  Continues Plan of care: Treatment covered based on POC and graduated to patient's progress. Pt education continues at each visit to obtain maximum benefits from skilled OT intervention.   []  Alter Plan of care:   []  Discharge:      Esther Michael Tommie 86, OTR/L #[id number]

## 2020-06-23 ENCOUNTER — TREATMENT (OUTPATIENT)
Dept: OCCUPATIONAL THERAPY | Age: 40
End: 2020-06-23
Payer: COMMERCIAL

## 2020-06-23 PROCEDURE — 97110 THERAPEUTIC EXERCISES: CPT | Performed by: OCCUPATIONAL THERAPIST

## 2020-06-23 PROCEDURE — 97140 MANUAL THERAPY 1/> REGIONS: CPT | Performed by: OCCUPATIONAL THERAPIST

## 2020-06-23 PROCEDURE — 97022 WHIRLPOOL THERAPY: CPT | Performed by: OCCUPATIONAL THERAPIST

## 2020-06-23 NOTE — PROGRESS NOTES
OCCUPATIONAL THERAPY PROGRESS NOTE    Date:  2020                                      Initial Evaluation Date: 2020    Patient Name:  Meena Cisneros    :  1980    Restrictions/Precautions: Right hand ROM as tolerated, modalities and edema control prn , low fall risk  Diagnosis:G56.03 (ICD-10-CM) - Bilateral carpal tunnel syndrome                                                              Insurance/Certification information: Medical mutual   Referring Practitioner:  Dr. Sheri Mcclain  Date of Surgery/Injury: 2020   (10 weeks post op)  Plan of care signed (Y/N):  N  Visit# / total visits: 2/5    Pain Level: 0/10     Subjective: Pt states \" My hand is better because I haven't been working. I have not had much pain. COVID-19 Screening completed upon entering facility and patient cleared for treatment today. Pt followed all protocols set forth by [address] [place] [address] for Outpatient services throughout therapy session. Patient has been made aware of all new hygiene protocols due to COVID-19 set forth by [address] [place] [address] Outpatient services and agrees to abide by all protocols. Objective:  Updated POC to be completed by last visit. INTERVENTION: COMPLETED: SPECIFICS/COMMENTS:   Modality:     Fluidotherapy x 15 mins to R hand with AROM encouraged throughout. Ice   5 min end of session to decrease pain. Doing at home after work. MHP  5 min for soft tissue warm up   US x To CT scar area - 3.3 MHz, 50% at .o8 intensity for 8 mins   AROM:     Digital tendon gliding  Going back to ext between each position - 2 sets x15   [place] medicine balls x 5 mins clock wise and counter with mild difficulty   Median nerve glide  2 sets x10. Did 2 - one with hand only and only with arm abducted fingers open and closed   Flat fist one finger at a time. 2 sets x10 .   Doing flat fist with one finger at time - SF and RF together and IF and MF alone   Wristiziser  X 10 with elbow on table to isolate wrist motions. Pegs activity  Using RUE to place large pegs into peg board and then completed small pegs into small peg board. Towel scrunches x 5 mins using RUE to complete. AAROM:               PROM/Stretching:     R wrist all planes x During scar massage   Putty press x To assist with stretching palm and decreasing scar tissue to press and push into xsoft putty with good tolerance. x10   Wrist ext table top stretch x With 15 sec holds end range. Scar Mass/Edema Control:     Scar and retrograde  x To R wrist/ digits. CT scar with wrist and thumb ext stretch   yellow therabar  Roll onto scar to stretch thenar /hypothenar area. Massage and stretch  x R CT scar area and thenar/ hypothenar area   Gel scar pad   to wear at night with tubigrip compression sleeve size D- added a hand based splint to wear with it to ^ pressure on the scar. Putty press x x15 in a mixture of soft putty. Pushing down and forward. Strengthenin# wt  - 20 rep's  Wrist ext, wrist flexion and RD - UD     soft putty  Gripping 3 min., roll and 3 pt pinch x;s 2,   -Picking out small beads using RUE from putty. Yellow web bd  For wrist ext stretch and gripping - pt stopped before ^ in pain. Hand gripper x x15 yellow spring at 25# with fair tolerance   Red therabar  15 rep's  Bend down, twist and hold L bend forward R   Red extender  2 sets x20         Other:     HEP  Cont with tendon gliding, med nerve glides, and scar management gel pad/ splint  for night .easy putty for home use. Icing - to do 2-3 x's a day. Kinesio Tape x Completed carpal tunnel taping to assist with pain management ( 1 I strip paper off tension laterally over scar and I strip 50% tension over carpal tunnel site and wrapped around wrist. Used again this date. Also did on on thumb thenar to decrease edema. Assessment/Comments: Pt is making Good progress toward stated plan of care.  Pt tolerated session well and reported no increased pain with strengthening however reports edema. Therapist noted mild edema at start and end of session.     -Rehab Potential: Good  -Requires OT Follow Up: Yes  Time In:  3:00 pm            Time Out:   4:00 pm             Visit #: 2/5    Treatment Charges: Mins Units   Modalities- Fluido 15 1   Ther Exercise 25 2   Manual Therapy 20 1   Thera Activities     ADL/Home Mgt      Neuro Re-education     Gait Training     Group Therapy     Non-Billable Service Time-ice     Other US     Total Time/Units 60 4       -Response to Treatment: Pt responded fair to treatment this date will continue with pain reduction techniques at home and tried kinesiotaping aging this date. Goals: Goals for pt can be see on initial eval occurring on 4/30/2020    Plan:   [x]  Continues Plan of care: Treatment covered based on POC and graduated to patient's progress. Pt education continues at each visit to obtain maximum benefits from skilled OT intervention.   []  Alter Plan of care:   []  Discharge:      Thaddeus Michael Tommie 86, OTR/L #[id number]

## 2020-06-25 ENCOUNTER — HOSPITAL ENCOUNTER (OUTPATIENT)
Age: 40
Discharge: HOME | End: 2020-06-27
Payer: COMMERCIAL

## 2020-06-25 ENCOUNTER — VIRTUAL VISIT (OUTPATIENT)
Dept: PRIMARY CARE CLINIC | Age: 40
End: 2020-06-25
Payer: COMMERCIAL

## 2020-06-25 LAB
ALBUMIN SERPL-MCNC: 4 G/DL (ref 3.5–5.2)
ALP BLD-CCNC: 193 U/L (ref 35–104)
ALT SERPL-CCNC: 20 U/L (ref 0–32)
ANION GAP SERPL CALCULATED.3IONS-SCNC: 15 MMOL/L (ref 7–16)
AST SERPL-CCNC: 22 U/L (ref 0–31)
BILIRUB SERPL-MCNC: 0.5 MG/DL (ref 0–1.2)
BUN BLDV-MCNC: 10 MG/DL (ref 6–20)
CALCIUM SERPL-MCNC: 9.4 MG/DL (ref 8.6–10.2)
CHLORIDE BLD-SCNC: 94 MMOL/L (ref 98–107)
CHOLESTEROL, TOTAL: 201 MG/DL (ref 0–199)
CO2: 24 MMOL/L (ref 22–29)
CREAT SERPL-MCNC: 0.7 MG/DL (ref 0.5–1)
GFR AFRICAN AMERICAN: >60
GFR NON-AFRICAN AMERICAN: >60 ML/MIN/1.73
GLUCOSE BLD-MCNC: 388 MG/DL (ref 74–99)
HBA1C MFR BLD: 10.8 % (ref 4–5.6)
HCT VFR BLD CALC: 41.8 % (ref 34–48)
HDLC SERPL-MCNC: 39 MG/DL
HEMOGLOBIN: 13.8 G/DL (ref 11.5–15.5)
LDL CHOLESTEROL CALCULATED: 130 MG/DL (ref 0–99)
MCH RBC QN AUTO: 28.7 PG (ref 26–35)
MCHC RBC AUTO-ENTMCNC: 33 % (ref 32–34.5)
MCV RBC AUTO: 86.9 FL (ref 80–99.9)
PDW BLD-RTO: 11.6 FL (ref 11.5–15)
PLATELET # BLD: 251 E9/L (ref 130–450)
PMV BLD AUTO: 11.8 FL (ref 7–12)
POTASSIUM SERPL-SCNC: 3.9 MMOL/L (ref 3.5–5)
RBC # BLD: 4.81 E12/L (ref 3.5–5.5)
SODIUM BLD-SCNC: 133 MMOL/L (ref 132–146)
T3 FREE: 1.8 PG/ML (ref 2–4.4)
T4 FREE: 1.14 NG/DL (ref 0.93–1.7)
TOTAL PROTEIN: 8.3 G/DL (ref 6.4–8.3)
TRIGL SERPL-MCNC: 162 MG/DL (ref 0–149)
TSH SERPL DL<=0.05 MIU/L-ACNC: 3.38 UIU/ML (ref 0.27–4.2)
VLDLC SERPL CALC-MCNC: 32 MG/DL
WBC # BLD: 9.5 E9/L (ref 4.5–11.5)

## 2020-06-25 PROCEDURE — 80053 COMPREHEN METABOLIC PANEL: CPT

## 2020-06-25 PROCEDURE — G8427 DOCREV CUR MEDS BY ELIG CLIN: HCPCS | Performed by: FAMILY MEDICINE

## 2020-06-25 PROCEDURE — 84481 FREE ASSAY (FT-3): CPT

## 2020-06-25 PROCEDURE — 1036F TOBACCO NON-USER: CPT | Performed by: FAMILY MEDICINE

## 2020-06-25 PROCEDURE — G8419 CALC BMI OUT NRM PARAM NOF/U: HCPCS | Performed by: FAMILY MEDICINE

## 2020-06-25 PROCEDURE — 84443 ASSAY THYROID STIM HORMONE: CPT

## 2020-06-25 PROCEDURE — 85027 COMPLETE CBC AUTOMATED: CPT

## 2020-06-25 PROCEDURE — 2022F DILAT RTA XM EVC RTNOPTHY: CPT | Performed by: FAMILY MEDICINE

## 2020-06-25 PROCEDURE — 3046F HEMOGLOBIN A1C LEVEL >9.0%: CPT | Performed by: FAMILY MEDICINE

## 2020-06-25 PROCEDURE — 36415 COLL VENOUS BLD VENIPUNCTURE: CPT

## 2020-06-25 PROCEDURE — 80061 LIPID PANEL: CPT

## 2020-06-25 PROCEDURE — 83036 HEMOGLOBIN GLYCOSYLATED A1C: CPT

## 2020-06-25 PROCEDURE — 99214 OFFICE O/P EST MOD 30 MIN: CPT | Performed by: FAMILY MEDICINE

## 2020-06-25 PROCEDURE — 84439 ASSAY OF FREE THYROXINE: CPT

## 2020-06-25 ASSESSMENT — ENCOUNTER SYMPTOMS
SINUS PRESSURE: 1
SORE THROAT: 0
DIARRHEA: 0
COLOR CHANGE: 0
SHORTNESS OF BREATH: 0
BACK PAIN: 0
COUGH: 0
EYE REDNESS: 0
WHEEZING: 0
BELCHING: 1
EYE PAIN: 0
CHEST TIGHTNESS: 0
NAUSEA: 0
ABDOMINAL PAIN: 1
APNEA: 0
SINUS COMPLAINT: 1
RHINORRHEA: 0
EYE ITCHING: 0
BLOOD IN STOOL: 0
SWOLLEN GLANDS: 0
CONSTIPATION: 0
VOMITING: 0

## 2020-06-25 NOTE — PROGRESS NOTES
TeleMedicine Video Visit    This visit was performed as a virtual video visit using a synchronous, two-way, audio-video telehealth technology platform. Patient identification was verified at the start of the visit, including the patient's telephone number and physical location. I discussed with the patient the nature of our telehealth visits, that:     1. Due to the nature of an audio- video modality, the only components of a physical exam that could be done are the elements supported by direct observation. 2. I would evaluate the patient and recommend diagnostics and treatments based on my assessment. 3. If it was felt that the patient should be evaluated in clinic or an emergency room setting, then they would be directed there. 4. Our sessions are not being recorded and that personal health information is protected. 5. Our team would provide follow up care in person if/when the patient needs it. Patient does agree to proceed with telemedicine consultation. Patient's location: home address in [facility]  Physician  location other address in [facility] other people involved in call n/a      Time spent: Greater than 25    This visit was completed virtually using D[employer]. me      Chief Complaint:     Chief Complaint   Patient presents with    Hyperlipidemia    Diabetes    Abdominal Pain    Palpitations         Abdominal Pain   This is a recurrent problem. The current episode started more than 1 month ago. The problem occurs intermittently. The problem has been waxing and waning. The pain is located in the epigastric region. The quality of the pain is aching and dull. The abdominal pain does not radiate. Associated symptoms include belching and headaches. Pertinent negatives include no arthralgias, constipation, diarrhea, dysuria, fever, frequency, hematuria, myalgias, nausea, vomiting or weight loss. Nothing aggravates the pain. The pain is relieved by nothing. She has tried nothing for the symptoms.  The treatment provided no relief. Diabetes   She presents for her follow-up diabetic visit. She has type 2 diabetes mellitus. Her disease course has been stable. Hypoglycemia symptoms include headaches. Pertinent negatives for hypoglycemia include no dizziness or nervousness/anxiousness. Pertinent negatives for diabetes include no chest pain, no fatigue, no weakness and no weight loss. There are no hypoglycemic complications. Symptoms are stable. There are no diabetic complications. Risk factors for coronary artery disease include diabetes mellitus and dyslipidemia. Current diabetic treatment includes oral agent (monotherapy). She is compliant with treatment all of the time. Her weight is stable. When asked about meal planning, she reported none. There is no change in her home blood glucose trend. An ACE inhibitor/angiotensin II receptor blocker is not being taken. She does not see a podiatrist.Eye exam is not current. Hyperlipidemia   This is a chronic problem. The current episode started more than 1 month ago. The problem is controlled. Exacerbating diseases include diabetes and hypothyroidism. Pertinent negatives include no chest pain, myalgias or shortness of breath. Current antihyperlipidemic treatment includes statins. The current treatment provides significant improvement of lipids. There are no compliance problems. Risk factors for coronary artery disease include diabetes mellitus and dyslipidemia.        Patient Active Problem List   Diagnosis    Gastroesophageal reflux disease without esophagitis    Hiatal hernia    Carpal tunnel syndrome of right wrist       Past Medical History:   Diagnosis Date    Carpal tunnel syndrome, right     Diabetes mellitus ([address]     GERD (gastroesophageal reflux disease)     Hyperlipidemia     Hypothyroidism        Past Surgical History:   Procedure Laterality Date    CARPAL TUNNEL RELEASE Right 3/18/2020    RIGHT CARPAL TUNNEL RELEASE performed by Maria C Anderson MD at [facility]

## 2020-06-26 ENCOUNTER — OFFICE VISIT (OUTPATIENT)
Dept: PRIMARY CARE CLINIC | Age: 40
End: 2020-06-26
Payer: COMMERCIAL

## 2020-06-26 VITALS
TEMPERATURE: 97.6 F | HEIGHT: 61 IN | WEIGHT: 138 LBS | OXYGEN SATURATION: 98 % | BODY MASS INDEX: 26.06 KG/M2 | SYSTOLIC BLOOD PRESSURE: 118 MMHG | HEART RATE: 82 BPM | DIASTOLIC BLOOD PRESSURE: 70 MMHG | RESPIRATION RATE: 16 BRPM

## 2020-06-26 PROBLEM — E11.9 TYPE 2 DIABETES MELLITUS WITHOUT COMPLICATION, WITHOUT LONG-TERM CURRENT USE OF INSULIN (HCC): Status: ACTIVE | Noted: 2020-06-26

## 2020-06-26 PROBLEM — R10.30 LOWER ABDOMINAL PAIN: Status: ACTIVE | Noted: 2020-06-26

## 2020-06-26 PROCEDURE — G8427 DOCREV CUR MEDS BY ELIG CLIN: HCPCS | Performed by: FAMILY MEDICINE

## 2020-06-26 PROCEDURE — 99214 OFFICE O/P EST MOD 30 MIN: CPT | Performed by: FAMILY MEDICINE

## 2020-06-26 PROCEDURE — 93000 ELECTROCARDIOGRAM COMPLETE: CPT | Performed by: FAMILY MEDICINE

## 2020-06-26 PROCEDURE — 3046F HEMOGLOBIN A1C LEVEL >9.0%: CPT | Performed by: FAMILY MEDICINE

## 2020-06-26 PROCEDURE — 1036F TOBACCO NON-USER: CPT | Performed by: FAMILY MEDICINE

## 2020-06-26 PROCEDURE — G8419 CALC BMI OUT NRM PARAM NOF/U: HCPCS | Performed by: FAMILY MEDICINE

## 2020-06-26 PROCEDURE — 2022F DILAT RTA XM EVC RTNOPTHY: CPT | Performed by: FAMILY MEDICINE

## 2020-06-26 RX ORDER — LEVOTHYROXINE SODIUM 0.03 MG/1
25 TABLET ORAL DAILY
Qty: 30 TABLET | Refills: 5 | Status: SHIPPED
Start: 2020-06-26 | End: 2020-08-09 | Stop reason: SDUPTHER

## 2020-06-26 RX ORDER — GLIMEPIRIDE 4 MG/1
4 TABLET ORAL
Qty: 30 TABLET | Refills: 3 | Status: SHIPPED
Start: 2020-06-26 | End: 2020-07-31 | Stop reason: SDUPTHER

## 2020-06-26 RX ORDER — DULAGLUTIDE 0.75 MG/.5ML
0.75 INJECTION, SOLUTION SUBCUTANEOUS WEEKLY
Qty: 4 PEN | Refills: 3 | Status: SHIPPED
Start: 2020-06-26 | End: 2020-08-10 | Stop reason: SDUPTHER

## 2020-06-26 ASSESSMENT — ENCOUNTER SYMPTOMS
BLOOD IN STOOL: 0
NAUSEA: 0
EYE PAIN: 0
RHINORRHEA: 0
DIARRHEA: 0
ABDOMINAL PAIN: 1
COLOR CHANGE: 0
BELCHING: 1
CONSTIPATION: 0
VOMITING: 0
BACK PAIN: 0
APNEA: 0
EYE REDNESS: 0
COUGH: 0
SHORTNESS OF BREATH: 0
WHEEZING: 0
SINUS PRESSURE: 0
EYE ITCHING: 0
CHEST TIGHTNESS: 0
SORE THROAT: 0

## 2020-06-26 NOTE — PROGRESS NOTES
needed. 100 each 3     No current facility-administered medications for this visit. Allergies   Allergen Reactions    Iodine Anaphylaxis    Shellfish-Derived Products Anaphylaxis, Hives, Itching and Rash       Social History     Socioeconomic History    Marital status:      Spouse name: None    Number of children: None    Years of education: None    Highest education level: None   Occupational History    None   Social Needs    Financial resource strain: None    Food insecurity     Worry: None     Inability: None    Transportation needs     Medical: None     Non-medical: None   Tobacco Use    Smoking status: Never Smoker    Smokeless tobacco: Never Used   Substance and Sexual Activity    Alcohol use: Never     Frequency: Never    Drug use: Never    Sexual activity: None   Lifestyle    Physical activity     Days per week: None     Minutes per session: None    Stress: None   Relationships    Social connections     Talks on phone: None     Gets together: None     Attends [employer] service: None     Active member of club or organization: None     Attends meetings of clubs or organizations: None     Relationship status: None    Intimate partner violence     Fear of current or ex partner: None     Emotionally abused: None     Physically abused: None     Forced sexual activity: None   Other Topics Concern    None   Social History Narrative    None       History reviewed. No pertinent family history. Review of Systems   Constitutional: Negative for activity change, appetite change, fatigue, fever and weight loss. HENT: Negative for congestion, ear pain, hearing loss, nosebleeds, rhinorrhea, sinus pressure and sore throat. Eyes: Negative for pain, redness, itching and visual disturbance. Respiratory: Negative for apnea, cough, chest tightness, shortness of breath and wheezing. Cardiovascular: Positive for palpitations. Negative for chest pain and leg swelling.

## 2020-06-29 ENCOUNTER — TREATMENT (OUTPATIENT)
Dept: OCCUPATIONAL THERAPY | Age: 40
End: 2020-06-29

## 2020-06-29 ENCOUNTER — APPOINTMENT (OUTPATIENT)
Dept: CT IMAGING | Age: 40
DRG: 871 | End: 2020-06-29
Payer: COMMERCIAL

## 2020-06-29 ENCOUNTER — APPOINTMENT (OUTPATIENT)
Dept: GENERAL RADIOLOGY | Age: 40
DRG: 871 | End: 2020-06-29
Payer: COMMERCIAL

## 2020-06-29 ENCOUNTER — HOSPITAL ENCOUNTER (INPATIENT)
Age: 40
LOS: 6 days | Discharge: HOME HEALTH | DRG: 871 | End: 2020-07-05
Attending: EMERGENCY MEDICINE | Admitting: STUDENT IN AN ORGANIZED HEALTH CARE EDUCATION/TRAINING PROGRAM
Payer: COMMERCIAL

## 2020-06-29 ENCOUNTER — APPOINTMENT (OUTPATIENT)
Dept: ULTRASOUND IMAGING | Age: 40
DRG: 871 | End: 2020-06-29
Payer: COMMERCIAL

## 2020-06-29 PROBLEM — R65.10 SIRS (SYSTEMIC INFLAMMATORY RESPONSE SYNDROME) (HCC): Status: ACTIVE | Noted: 2020-06-29

## 2020-06-29 PROBLEM — K76.9 LIVER LESION: Status: ACTIVE | Noted: 2020-06-29

## 2020-06-29 PROBLEM — R10.9 ABDOMINAL PAIN: Status: ACTIVE | Noted: 2020-06-26

## 2020-06-29 PROBLEM — A41.9 SEPSIS (HCC): Status: ACTIVE | Noted: 2020-06-29

## 2020-06-29 LAB
ACETAMINOPHEN LEVEL: <5 MCG/ML (ref 10–30)
ALBUMIN SERPL-MCNC: 3.6 G/DL (ref 3.5–5.2)
ALP BLD-CCNC: 366 U/L (ref 35–104)
ALT SERPL-CCNC: 53 U/L (ref 0–32)
AMPHETAMINE SCREEN, URINE: NOT DETECTED
ANION GAP SERPL CALCULATED.3IONS-SCNC: 15 MMOL/L (ref 7–16)
APTT: 27.1 SEC (ref 24.5–35.1)
AST SERPL-CCNC: 61 U/L (ref 0–31)
BACTERIA: ABNORMAL /HPF
BARBITURATE SCREEN URINE: NOT DETECTED
BASOPHILS ABSOLUTE: 0 E9/L (ref 0–0.2)
BASOPHILS RELATIVE PERCENT: 0 % (ref 0–2)
BENZODIAZEPINE SCREEN, URINE: NOT DETECTED
BILIRUB SERPL-MCNC: 1 MG/DL (ref 0–1.2)
BILIRUBIN URINE: NEGATIVE
BLOOD, URINE: ABNORMAL
BUN BLDV-MCNC: 11 MG/DL (ref 6–20)
CALCIUM SERPL-MCNC: 9.2 MG/DL (ref 8.6–10.2)
CANNABINOID SCREEN URINE: NOT DETECTED
CEA: 0.9 NG/ML (ref 0–5.2)
CHLORIDE BLD-SCNC: 97 MMOL/L (ref 98–107)
CLARITY: CLEAR
CO2: 22 MMOL/L (ref 22–29)
COARSE CASTS, UA: ABNORMAL /LPF (ref 0–2)
COCAINE METABOLITE SCREEN URINE: NOT DETECTED
COLOR: YELLOW
CREAT SERPL-MCNC: 0.7 MG/DL (ref 0.5–1)
EOSINOPHILS ABSOLUTE: 0.04 E9/L (ref 0.05–0.5)
EOSINOPHILS RELATIVE PERCENT: 0.9 % (ref 0–6)
ETHANOL: <10 MG/DL (ref 0–0.08)
FENTANYL SCREEN, URINE: NOT DETECTED
FERRITIN: 1852 NG/ML
GAMMA GLUTAMYL TRANSFERASE: 377 U/L (ref 6–42)
GFR AFRICAN AMERICAN: >60
GFR NON-AFRICAN AMERICAN: >60 ML/MIN/1.73
GLUCOSE BLD-MCNC: 299 MG/DL (ref 74–99)
GLUCOSE URINE: >=1000 MG/DL
HCG(URINE) PREGNANCY TEST: NEGATIVE
HCT VFR BLD CALC: 38.4 % (ref 34–48)
HEMOGLOBIN: 12.8 G/DL (ref 11.5–15.5)
INR BLD: 1.1
IRON SATURATION: 9 % (ref 15–50)
IRON: 14 MCG/DL (ref 37–145)
KETONES, URINE: ABNORMAL MG/DL
LACTIC ACID, SEPSIS: 2.5 MMOL/L (ref 0.5–1.9)
LACTIC ACID, SEPSIS: 4.3 MMOL/L (ref 0.5–1.9)
LACTIC ACID: 1.4 MMOL/L (ref 0.5–2.2)
LEUKOCYTE ESTERASE, URINE: NEGATIVE
LYMPHOCYTES ABSOLUTE: 0.82 E9/L (ref 1.5–4)
LYMPHOCYTES RELATIVE PERCENT: 21 % (ref 20–42)
Lab: NORMAL
MCH RBC QN AUTO: 29 PG (ref 26–35)
MCHC RBC AUTO-ENTMCNC: 33.3 % (ref 32–34.5)
MCV RBC AUTO: 86.9 FL (ref 80–99.9)
METAMYELOCYTES RELATIVE PERCENT: 0.9 % (ref 0–1)
METER GLUCOSE: 287 MG/DL (ref 74–99)
METHADONE SCREEN, URINE: NOT DETECTED
MONOCYTES ABSOLUTE: 0 E9/L (ref 0.1–0.95)
MONOCYTES RELATIVE PERCENT: 0 % (ref 2–12)
NEUTROPHILS ABSOLUTE: 3.04 E9/L (ref 1.8–7.3)
NEUTROPHILS RELATIVE PERCENT: 77.2 % (ref 43–80)
NITRITE, URINE: NEGATIVE
NUCLEATED RED BLOOD CELLS: 0 /100 WBC
OPIATE SCREEN URINE: NOT DETECTED
OXYCODONE URINE: NOT DETECTED
PDW BLD-RTO: 11.6 FL (ref 11.5–15)
PH UA: 6 (ref 5–9)
PH VENOUS: 7.41 (ref 7.35–7.45)
PHENCYCLIDINE SCREEN URINE: NOT DETECTED
PLATELET # BLD: 191 E9/L (ref 130–450)
PMV BLD AUTO: 11.5 FL (ref 7–12)
POLYCHROMASIA: ABNORMAL
POTASSIUM REFLEX MAGNESIUM: 4.1 MMOL/L (ref 3.5–5)
PROTEIN UA: >=300 MG/DL
PROTHROMBIN TIME: 12.8 SEC (ref 9.3–12.4)
RBC # BLD: 4.42 E12/L (ref 3.5–5.5)
RBC UA: ABNORMAL /HPF (ref 0–2)
SALICYLATE, SERUM: <0.3 MG/DL (ref 0–30)
SARS-COV-2, NAAT: NOT DETECTED
SEDIMENTATION RATE, ERYTHROCYTE: 111 MM/HR (ref 0–20)
SODIUM BLD-SCNC: 134 MMOL/L (ref 132–146)
SPECIFIC GRAVITY UA: 1.02 (ref 1–1.03)
TOTAL IRON BINDING CAPACITY: 157 MCG/DL (ref 250–450)
TOTAL PROTEIN: 8.4 G/DL (ref 6.4–8.3)
TRICYCLIC ANTIDEPRESSANTS SCREEN SERUM: NEGATIVE NG/ML
TSH SERPL DL<=0.05 MIU/L-ACNC: 1.81 UIU/ML (ref 0.27–4.2)
UROBILINOGEN, URINE: 0.2 E.U./DL
VACUOLATED NEUTROPHILS: ABNORMAL
WBC # BLD: 3.9 E9/L (ref 4.5–11.5)
WBC UA: ABNORMAL /HPF (ref 0–5)
YEAST: PRESENT /HPF

## 2020-06-29 PROCEDURE — 74176 CT ABD & PELVIS W/O CONTRAST: CPT

## 2020-06-29 PROCEDURE — 99223 1ST HOSP IP/OBS HIGH 75: CPT | Performed by: INTERNAL MEDICINE

## 2020-06-29 PROCEDURE — 82800 BLOOD PH: CPT

## 2020-06-29 PROCEDURE — 76705 ECHO EXAM OF ABDOMEN: CPT

## 2020-06-29 PROCEDURE — 84443 ASSAY THYROID STIM HORMONE: CPT

## 2020-06-29 PROCEDURE — 6370000000 HC RX 637 (ALT 250 FOR IP): Performed by: EMERGENCY MEDICINE

## 2020-06-29 PROCEDURE — 85730 THROMBOPLASTIN TIME PARTIAL: CPT

## 2020-06-29 PROCEDURE — 2500000003 HC RX 250 WO HCPCS: Performed by: INTERNAL MEDICINE

## 2020-06-29 PROCEDURE — 82728 ASSAY OF FERRITIN: CPT

## 2020-06-29 PROCEDURE — 36592 COLLECT BLOOD FROM PICC: CPT

## 2020-06-29 PROCEDURE — 71045 X-RAY EXAM CHEST 1 VIEW: CPT

## 2020-06-29 PROCEDURE — 82105 ALPHA-FETOPROTEIN SERUM: CPT

## 2020-06-29 PROCEDURE — 6370000000 HC RX 637 (ALT 250 FOR IP)

## 2020-06-29 PROCEDURE — 6370000000 HC RX 637 (ALT 250 FOR IP): Performed by: STUDENT IN AN ORGANIZED HEALTH CARE EDUCATION/TRAINING PROGRAM

## 2020-06-29 PROCEDURE — 96365 THER/PROPH/DIAG IV INF INIT: CPT

## 2020-06-29 PROCEDURE — 84080 ASSAY ALKALINE PHOSPHATASES: CPT

## 2020-06-29 PROCEDURE — 2580000003 HC RX 258: Performed by: STUDENT IN AN ORGANIZED HEALTH CARE EDUCATION/TRAINING PROGRAM

## 2020-06-29 PROCEDURE — 6360000002 HC RX W HCPCS: Performed by: INTERNAL MEDICINE

## 2020-06-29 PROCEDURE — 36415 COLL VENOUS BLD VENIPUNCTURE: CPT

## 2020-06-29 PROCEDURE — 80074 ACUTE HEPATITIS PANEL: CPT

## 2020-06-29 PROCEDURE — 83540 ASSAY OF IRON: CPT

## 2020-06-29 PROCEDURE — 84075 ASSAY ALKALINE PHOSPHATASE: CPT

## 2020-06-29 PROCEDURE — 86753 PROTOZOA ANTIBODY NOS: CPT

## 2020-06-29 PROCEDURE — G0480 DRUG TEST DEF 1-7 CLASSES: HCPCS

## 2020-06-29 PROCEDURE — 81001 URINALYSIS AUTO W/SCOPE: CPT

## 2020-06-29 PROCEDURE — 36556 INSERT NON-TUNNEL CV CATH: CPT

## 2020-06-29 PROCEDURE — 80307 DRUG TEST PRSMV CHEM ANLYZR: CPT

## 2020-06-29 PROCEDURE — 85025 COMPLETE CBC W/AUTO DIFF WBC: CPT

## 2020-06-29 PROCEDURE — 85610 PROTHROMBIN TIME: CPT

## 2020-06-29 PROCEDURE — 82378 CARCINOEMBRYONIC ANTIGEN: CPT

## 2020-06-29 PROCEDURE — 80053 COMPREHEN METABOLIC PANEL: CPT

## 2020-06-29 PROCEDURE — 82977 ASSAY OF GGT: CPT

## 2020-06-29 PROCEDURE — 81025 URINE PREGNANCY TEST: CPT

## 2020-06-29 PROCEDURE — 87081 CULTURE SCREEN ONLY: CPT

## 2020-06-29 PROCEDURE — 02HV33Z INSERTION OF INFUSION DEVICE INTO SUPERIOR VENA CAVA, PERCUTANEOUS APPROACH: ICD-10-PCS | Performed by: INTERNAL MEDICINE

## 2020-06-29 PROCEDURE — 6370000000 HC RX 637 (ALT 250 FOR IP): Performed by: FAMILY MEDICINE

## 2020-06-29 PROCEDURE — 86140 C-REACTIVE PROTEIN: CPT

## 2020-06-29 PROCEDURE — 87040 BLOOD CULTURE FOR BACTERIA: CPT

## 2020-06-29 PROCEDURE — 85651 RBC SED RATE NONAUTOMATED: CPT

## 2020-06-29 PROCEDURE — 82103 ALPHA-1-ANTITRYPSIN TOTAL: CPT

## 2020-06-29 PROCEDURE — 82962 GLUCOSE BLOOD TEST: CPT

## 2020-06-29 PROCEDURE — 87150 DNA/RNA AMPLIFIED PROBE: CPT

## 2020-06-29 PROCEDURE — U0002 COVID-19 LAB TEST NON-CDC: HCPCS

## 2020-06-29 PROCEDURE — 2580000003 HC RX 258: Performed by: INTERNAL MEDICINE

## 2020-06-29 PROCEDURE — 2000000000 HC ICU R&B

## 2020-06-29 PROCEDURE — 83550 IRON BINDING TEST: CPT

## 2020-06-29 PROCEDURE — 87088 URINE BACTERIA CULTURE: CPT

## 2020-06-29 PROCEDURE — 87186 SC STD MICRODIL/AGAR DIL: CPT

## 2020-06-29 PROCEDURE — 83605 ASSAY OF LACTIC ACID: CPT

## 2020-06-29 PROCEDURE — 99285 EMERGENCY DEPT VISIT HI MDM: CPT

## 2020-06-29 PROCEDURE — 6360000002 HC RX W HCPCS: Performed by: EMERGENCY MEDICINE

## 2020-06-29 PROCEDURE — 96375 TX/PRO/DX INJ NEW DRUG ADDON: CPT

## 2020-06-29 PROCEDURE — 2580000003 HC RX 258: Performed by: EMERGENCY MEDICINE

## 2020-06-29 PROCEDURE — 87077 CULTURE AEROBIC IDENTIFY: CPT

## 2020-06-29 RX ORDER — LEVOTHYROXINE SODIUM 0.03 MG/1
25 TABLET ORAL DAILY
Status: DISCONTINUED | OUTPATIENT
Start: 2020-06-29 | End: 2020-07-05 | Stop reason: HOSPADM

## 2020-06-29 RX ORDER — DEXTROSE MONOHYDRATE 50 MG/ML
100 INJECTION, SOLUTION INTRAVENOUS PRN
Status: DISCONTINUED | OUTPATIENT
Start: 2020-06-29 | End: 2020-07-05 | Stop reason: HOSPADM

## 2020-06-29 RX ORDER — SODIUM CHLORIDE 0.9 % (FLUSH) 0.9 %
10 SYRINGE (ML) INJECTION EVERY 12 HOURS SCHEDULED
Status: DISCONTINUED | OUTPATIENT
Start: 2020-06-29 | End: 2020-07-05 | Stop reason: HOSPADM

## 2020-06-29 RX ORDER — SODIUM CHLORIDE 9 MG/ML
INJECTION, SOLUTION INTRAVENOUS EVERY 8 HOURS
Status: DISCONTINUED | OUTPATIENT
Start: 2020-06-29 | End: 2020-06-29

## 2020-06-29 RX ORDER — ACETAMINOPHEN 500 MG
1000 TABLET ORAL ONCE
Status: COMPLETED | OUTPATIENT
Start: 2020-06-29 | End: 2020-06-29

## 2020-06-29 RX ORDER — ACETAMINOPHEN 325 MG/1
650 TABLET ORAL EVERY 6 HOURS PRN
Status: DISCONTINUED | OUTPATIENT
Start: 2020-06-29 | End: 2020-07-05 | Stop reason: HOSPADM

## 2020-06-29 RX ORDER — PIPERACILLIN SODIUM, TAZOBACTAM SODIUM 3; .375 G/15ML; G/15ML
3.38 INJECTION, POWDER, LYOPHILIZED, FOR SOLUTION INTRAVENOUS EVERY 6 HOURS
Status: DISCONTINUED | OUTPATIENT
Start: 2020-06-29 | End: 2020-06-29 | Stop reason: CLARIF

## 2020-06-29 RX ORDER — PANTOPRAZOLE SODIUM 40 MG/1
40 TABLET, DELAYED RELEASE ORAL
Status: DISCONTINUED | OUTPATIENT
Start: 2020-06-30 | End: 2020-07-05 | Stop reason: HOSPADM

## 2020-06-29 RX ORDER — GLIMEPIRIDE 4 MG/1
4 TABLET ORAL
COMMUNITY
End: 2020-09-29

## 2020-06-29 RX ORDER — SODIUM CHLORIDE 0.9 % (FLUSH) 0.9 %
10 SYRINGE (ML) INJECTION PRN
Status: DISCONTINUED | OUTPATIENT
Start: 2020-06-29 | End: 2020-07-05 | Stop reason: HOSPADM

## 2020-06-29 RX ORDER — ONDANSETRON 2 MG/ML
4 INJECTION INTRAMUSCULAR; INTRAVENOUS ONCE
Status: COMPLETED | OUTPATIENT
Start: 2020-06-29 | End: 2020-06-29

## 2020-06-29 RX ORDER — DEXTROSE MONOHYDRATE 25 G/50ML
12.5 INJECTION, SOLUTION INTRAVENOUS PRN
Status: DISCONTINUED | OUTPATIENT
Start: 2020-06-29 | End: 2020-07-05 | Stop reason: HOSPADM

## 2020-06-29 RX ORDER — PROMETHAZINE HYDROCHLORIDE 25 MG/1
12.5 TABLET ORAL EVERY 6 HOURS PRN
Status: DISCONTINUED | OUTPATIENT
Start: 2020-06-29 | End: 2020-07-05 | Stop reason: HOSPADM

## 2020-06-29 RX ORDER — 0.9 % SODIUM CHLORIDE 0.9 %
30 INTRAVENOUS SOLUTION INTRAVENOUS ONCE
Status: COMPLETED | OUTPATIENT
Start: 2020-06-29 | End: 2020-06-29

## 2020-06-29 RX ORDER — SODIUM CHLORIDE 9 MG/ML
INJECTION, SOLUTION INTRAVENOUS CONTINUOUS
Status: DISCONTINUED | OUTPATIENT
Start: 2020-06-29 | End: 2020-07-04

## 2020-06-29 RX ORDER — ONDANSETRON 2 MG/ML
4 INJECTION INTRAMUSCULAR; INTRAVENOUS EVERY 6 HOURS PRN
Status: DISCONTINUED | OUTPATIENT
Start: 2020-06-29 | End: 2020-07-05 | Stop reason: HOSPADM

## 2020-06-29 RX ORDER — LIDOCAINE HYDROCHLORIDE AND EPINEPHRINE 10; 10 MG/ML; UG/ML
INJECTION, SOLUTION INFILTRATION; PERINEURAL
Status: DISPENSED
Start: 2020-06-29 | End: 2020-06-30

## 2020-06-29 RX ORDER — ACETAMINOPHEN 325 MG/1
650 TABLET ORAL EVERY 6 HOURS PRN
Status: ON HOLD | COMMUNITY
End: 2020-07-05 | Stop reason: HOSPADM

## 2020-06-29 RX ORDER — 0.9 % SODIUM CHLORIDE 0.9 %
1000 INTRAVENOUS SOLUTION INTRAVENOUS ONCE
Status: COMPLETED | OUTPATIENT
Start: 2020-06-29 | End: 2020-06-29

## 2020-06-29 RX ORDER — ACETAMINOPHEN 325 MG/1
TABLET ORAL
Status: COMPLETED
Start: 2020-06-29 | End: 2020-06-29

## 2020-06-29 RX ORDER — NICOTINE POLACRILEX 4 MG
15 LOZENGE BUCCAL PRN
Status: DISCONTINUED | OUTPATIENT
Start: 2020-06-29 | End: 2020-07-05 | Stop reason: HOSPADM

## 2020-06-29 RX ORDER — PIPERACILLIN SODIUM, TAZOBACTAM SODIUM 3; .375 G/15ML; G/15ML
INJECTION, POWDER, LYOPHILIZED, FOR SOLUTION INTRAVENOUS
Status: DISCONTINUED
Start: 2020-06-29 | End: 2020-06-29

## 2020-06-29 RX ADMIN — SODIUM CHLORIDE 1878 ML: 9 INJECTION, SOLUTION INTRAVENOUS at 11:25

## 2020-06-29 RX ADMIN — SODIUM CHLORIDE, PRESERVATIVE FREE 10 ML: 5 INJECTION INTRAVENOUS at 23:56

## 2020-06-29 RX ADMIN — SODIUM CHLORIDE: 9 INJECTION, SOLUTION INTRAVENOUS at 14:38

## 2020-06-29 RX ADMIN — ACETAMINOPHEN 1000 MG: 500 TABLET ORAL at 09:38

## 2020-06-29 RX ADMIN — SODIUM CHLORIDE 3 G: 900 INJECTION INTRAVENOUS at 17:29

## 2020-06-29 RX ADMIN — ONDANSETRON 4 MG: 2 INJECTION INTRAMUSCULAR; INTRAVENOUS at 09:38

## 2020-06-29 RX ADMIN — INSULIN LISPRO 3 UNITS: 100 INJECTION, SOLUTION INTRAVENOUS; SUBCUTANEOUS at 17:33

## 2020-06-29 RX ADMIN — LEVOTHYROXINE SODIUM 25 MCG: 25 TABLET ORAL at 17:24

## 2020-06-29 RX ADMIN — SODIUM CHLORIDE: 9 INJECTION, SOLUTION INTRAVENOUS at 17:29

## 2020-06-29 RX ADMIN — METRONIDAZOLE 500 MG: 500 INJECTION, SOLUTION INTRAVENOUS at 18:35

## 2020-06-29 RX ADMIN — SODIUM CHLORIDE 3 G: 900 INJECTION INTRAVENOUS at 23:55

## 2020-06-29 RX ADMIN — ACETAMINOPHEN 650 MG: 325 TABLET ORAL at 17:24

## 2020-06-29 RX ADMIN — CEFTRIAXONE 1 G: 1 INJECTION, POWDER, FOR SOLUTION INTRAMUSCULAR; INTRAVENOUS at 11:26

## 2020-06-29 RX ADMIN — SODIUM CHLORIDE 1000 ML: 9 INJECTION, SOLUTION INTRAVENOUS at 09:38

## 2020-06-29 RX ADMIN — ACETAMINOPHEN 650 MG: 325 TABLET ORAL at 23:55

## 2020-06-29 ASSESSMENT — PAIN SCALES - GENERAL
PAINLEVEL_OUTOF10: 0

## 2020-06-29 NOTE — CONSULTS
[address] [place] [address] [facility]    Consultation Note     Admit Date: 6/29/2020  8:57 AM    Reason for Consult:   Liver abscess    Attending Physician:  Jamla Street MD     Chief Complaint: abdominal pain fever     HISTORY OF PRESENT ILLNESS:     This 58-year-old female states that 1 month ago she began to develop chills abdominal pain and fever. Abdominal pain has been intermittent initially located in the epigastric area but now predominantly noted in the lower abdomen associated with belching and headaches. She also complains of some nausea but no vomiting or diarrhea. No hematemesis hematochezia or melena. She has no prior history of cholecystitis or diverticulitis. She denies any recent travel. She is a native of the [facility]. 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 She did have recent carpal tunnel surgery. She was seen by orthopedic surgeon on 6/15 at which time she was noted to have moderate swelling of her right hand and was given a Medrol Dosepak and ibuprofen. She was continued on medical leave. She denies recent ill contacts. Denies eating any unprocessed food. She denies any previous history of malignancy or recent weight loss. In the emergency room patient was noted to be tachycardic and had a fever 103. She later became somewhat hypotensive but did respond to fluid bolus.     Past Medical History:          Diagnosis Date    Carpal tunnel syndrome, right     Diabetes mellitus (HCC)     GERD (gastroesophageal reflux disease)     Hyperlipidemia     Hypothyroidism      Past Surgical History:          Procedure Laterality Date    CARPAL TUNNEL RELEASE Right 3/18/2020    RIGHT CARPAL TUNNEL RELEASE performed by Trish Chiang MD at [facility] OR     Current Medications:     Scheduled Meds:   piperacillin-tazobactam         Continuous Infusions:   sodium chloride 125 mL/hr at 06/29/20 1438     PRN Meds:    Allergies:  Iodine and Shellfish-derived products    Social History:     Social History     Socioeconomic History    Marital status:      Spouse name: None    Number of children: None    Years of education: None    Highest education level: None   Occupational History    None   Social Needs    Financial resource strain: None    Food insecurity     Worry: None     Inability: None    Transportation needs     Medical: None     Non-medical: None   Tobacco Use    Smoking status: Never Smoker    Smokeless tobacco: Never Used   Substance and Sexual Activity    Alcohol use: Never     Frequency: Never    Drug use: Never    Sexual activity: None   Lifestyle    Physical activity     Days per week: None     Minutes per session: None    Stress: None   Relationships    Social connections     Talks on phone: None     Gets together: None     Attends Voodoo service: None     Active member of club or organization: None     Attends meetings of clubs or organizations: None     Relationship status: None    Intimate partner violence     Fear of current or ex partner: None     Emotionally abused: None     Physically abused: None     Forced sexual activity: None   Other Topics Concern    None   Social History Narrative    None         Family History:         Problem Relation Age of Onset    Other Mother         aneurysm    Stroke Father     Heart Attack Father     No Known Problems Sister     Diabetes Brother     Heart Attack Brother 48    No Known Problems Sister    . Otherwise non-pertinent to the chief complaint. REVIEW OF SYSTEMS:    CONSTITUTIONAL: Fever and chills but no weight loss. EYES:  No double vision or drainage from eyes, ears or throat. HEENT:  No neck stiffness. No dysphagia. No drainage from eyes, ears or throat  RESPIRATORY:  No cough, productive sputum or hemoptysis. CARDIOVASCULAR:  No chest pain, palpitations, orthopnea or dyspnea on exertion.   GASTROINTESTINAL: Abdominal pain predominantly lower quadrant associated with nausea. GENITOURINARY:  No frequency burning dysuria or hematuria. INTEGUMENT/BREAST:  No rash or breast masses. HEMATOLOGIC/LYMPHATIC:  No lymphadenopathy or blood dyscrasics. ALLERGIC/IMMUNOLOGIC:  No anaphylaxis. ENDOCRINE:  No polyuria or polydipsia or temperature intolerance. MUSCULOSKELETAL:  No myalgia or arthralgia. Full ROM. NEUROLOGICAL:  No focal motor sensory deficit. BEHAVIOR/PSYCH:  No psychosis. PHYSICAL EXAM:      Vitals:    /62   Pulse 98   Temp 98 °F (36.7 °C) (Oral)   Resp 18   Ht 5' 1\" (1.549 m)   Wt 138 lb (62.6 kg)   LMP 06/08/2020   SpO2 98%   BMI 26.07 kg/m²   Constitutional: The patient is awake, alert, and oriented. Skin: Warm and dry. No rashes were noted. No jaundice. HEENT: Eyes show round, and reactive pupils. Moist mucous membranes, no ulcerations, no thrush. Neck: Supple to movements. No lymphadenopathy. Chest: No use of accessory muscles to breathe. Symmetrical expansion. Auscultation reveals no wheezing, crackles, or rhonchi. Cardiovascular: Regular rate and rhythm. No murmurs appreciated. Abdomen: Generalized abdominal discomfort especially right mid abdomen right upper quadrant left lower quadrant. Bowel sounds are hypoactive. No organomegaly or masses palpable. No guarding or rebound noted. Extremities: No clubbing, no cyanosis, no edema.   Neurological: No focal neurologic deficits        CBC+dif:  Recent Labs     06/29/20  0942   WBC 3.9*   HGB 12.8   HCT 38.4   MCV 86.9      NEUTROABS 3.04     No results found for: CRP  No results found for: CRPHS  No results found for: SEDRATE  Lab Results   Component Value Date    ALT 53 (H) 06/29/2020    AST 61 (H) 06/29/2020    ALKPHOS 366 (H) 06/29/2020    BILITOT 1.0 06/29/2020     Lab Results   Component Value Date     06/29/2020    K 4.1 06/29/2020    CL 97 06/29/2020    CO2 22 06/29/2020    BUN 11 06/29/2020    CREATININE 0.7 06/29/2020    GFRAA >60 06/29/2020    LABGLOM >60 06/29/2020    GLUCOSE 299 06/29/2020    PROT 8.4 06/29/2020    LABALBU 3.6 06/29/2020    CALCIUM 9.2 06/29/2020    BILITOT 1.0 06/29/2020    ALKPHOS 366 06/29/2020    AST 61 06/29/2020    ALT 53 06/29/2020       Lab Results   Component Value Date    PROTIME 12.8 06/29/2020    INR 1.1 06/29/2020       Lab Results   Component Value Date    TSH 3.380 06/25/2020       Lab Results   Component Value Date    COLORU Yellow 06/29/2020    PHUR 6.0 06/29/2020    WBCUA 0-1 06/29/2020    RBCUA 0-1 06/29/2020    YEAST Present 06/29/2020    BACTERIA RARE 06/29/2020    CLARITYU Clear 06/29/2020    SPECGRAV 1.020 06/29/2020    LEUKOCYTESUR Negative 06/29/2020    UROBILINOGEN 0.2 06/29/2020    BILIRUBINUR Negative 06/29/2020    BLOODU TRACE-INTACT 06/29/2020    GLUCOSEU >=1000 06/29/2020       Radiology:    XR CHEST PORTABLE   Final Result   Normal chest               CT ABDOMEN PELVIS WO CONTRAST Additional Contrast? None   Final Result   Complex lesion within the right lobe of the liver. This is   indeterminant. Recommend follow-up postcontrast. Malignancy cannot be   excluded. US ABDOMEN LIMITED    (Results Pending)       Microbiology:  Pending  No results for input(s): BC in the last 72 hours. No results for input(s): ORG in the last 72 hours. No results for input(s): English Childes in the last 72 hours. No results for input(s): STREPNEUMAGU in the last 72 hours. No results for input(s): LP1UAG in the last 72 hours. No results for input(s): ASO in the last 72 hours. No results for input(s): CULTRESP in the last 72 hours. Problem list:    Principal Problem:    Sepsis ([facility]  Active Problems:    Type 2 diabetes mellitus without complication, without long-term current use of insulin (HCC)    Abdominal pain    Liver lesion    SIRS (systemic inflammatory response syndrome) ([facility]  Resolved Problems:    * No resolved hospital problems.  *      Assessment:    · Pyogenic liver abscess  · Fever secondary to

## 2020-06-29 NOTE — PROGRESS NOTES
Critical Care Attending Attestation:      Patient seen and examined with the house staff. Family is updated at the bedside as available. Additional findings listed below as necessary. I personally saw, examined and provided care for the patient. Radiographs, labs and medication list were reviewed by me independently. I spoke with bedside nursing, therapists and consultants. Critical care services and times documented are independent of procedures and multidisciplinary rounds with Residents. Additionally comprehensive, multidisciplinary rounds were conducted with the MICU team. The case was discussed in detail and plans for care were established. Review of Residents documentation was conducted and revisions were made as appropriate. I agree with the above documented exam, problem list and plan of care. Additional comments:  1. Outpatient records reviewed. 2. Discussed with emergency room physician  3. Case reviewed with infectious disease  4. Films reviewed at length including the CT scan of the abdomen evidencing a defect in the liver consistent with abscess  5. Known history of diverticular disease  6. Transient hypotension treated in the emergency room with volume resuscitation. Central line is placed. 7. Antibiotics started per infectious disease including metronidazole and Unasyn  8. Interventional radiology for abscess drainage  9. Currently, no need for pressors. Chart review/lab review/X-ray viewing/documentation: 10 minutes  Assessment: 10 minutes  Conversation patient/family re: prognosis, care options and any end of life issues: 10 minutes  Conversation with staff: 10 minutes    Sonali Sanchez. Alyce Hannah M.D., F.C.C.P.

## 2020-06-29 NOTE — PLAN OF CARE
Problem: Fluid and Electrolyte Imbalance  Goal: Fluid and electrolyte balance are achieved/maintained  6/29/2020 1852 by Ganesh Melendez RN  Outcome: Met This Shift  6/29/2020 1414 by Janey Sahu RN  Outcome: Ongoing     Problem: Body Temperature - Imbalanced:  Goal: Body temperature is within normal range  6/29/2020 1852 by Ganesh Melendez RN  Outcome: Not Met This Shift  6/29/2020 1414 by Janey Sahu RN  Outcome: Met This Shift     Problem: INFECTION  Goal: Patient exhibits no signs of infection.   6/29/2020 1852 by Ganesh Melendez RN  Outcome: Not Met This Shift  6/29/2020 1414 by Janey Sahu RN  Outcome: Ongoing     Problem: Pain:  Goal: Pain level will decrease  Description: Pain level will decrease     6/29/2020 1852 by Ganesh Melendez RN  Outcome: Met This Shift  6/29/2020 1414 by Janey Sahu RN  Outcome: Ongoing     Problem: FALL RISK  Goal: Absence of falls  6/29/2020 1852 by Ganesh Melendez RN  Outcome: Met This Shift  6/29/2020 1414 by Janey Sahu RN  Outcome: Met This Shift

## 2020-06-29 NOTE — ED PROVIDER NOTES
HPI:  6/29/20,   Time: 9:19 AM EDT       Lowry Severin is a 35 y.o. female presenting to the ED for lower abd pain, beginning 1 week ago. The complaint has been intermittent, mild in severity, and worsened by nothing. Pt bib ems, for panic attack per pt, and lower abd pain. Pt denies fever/chills/sweats. Denies urinary sx. States pain radiates into back. No hx same. No relieving sx    Review of Systems:   Pertinent positives and negatives are stated within HPI, all other systems reviewed and are negative.          --------------------------------------------- PAST HISTORY ---------------------------------------------  Past Medical History:  has a past medical history of Carpal tunnel syndrome, right, Diabetes mellitus ([address] GERD (gastroesophageal reflux disease), Hyperlipidemia, and Hypothyroidism. Past Surgical History:  has a past surgical history that includes Carpal tunnel release (Right, 3/18/2020). Social History:  reports that she has never smoked. She has never used smokeless tobacco. She reports that she does not drink alcohol or use drugs. Family History: family history includes Diabetes in her brother; Heart Attack in her father; Heart Attack (age of onset: 48) in her brother; No Known Problems in her sister and sister; Other in her mother; Stroke in her father. The patients home medications have been reviewed.     Allergies: Iodine and Shellfish-derived products        ---------------------------------------------------PHYSICAL EXAM--------------------------------------    Constitutional/General: Alert and oriented x3, well appearing, non toxic in NAD  Head: Normocephalic and atraumatic  Eyes: PERRL, EOMI, conjunctive normal, sclera non icteric  Mouth: Oropharynx clear, handling secretions, no trismus, no asymmetry of the posterior oropharynx or uvular edema  Neck: Supple, full ROM, non tender to palpation in the midline, no stridor, no crepitus, no meningeal signs  Respiratory: Lungs clear to auscultation bilaterally, no wheezes, rales, or rhonchi. Not in respiratory distress  Cardiovascular:  Regular rate. Regular rhythm. No murmurs, gallops, or rubs. 2+ distal pulses  Chest: No chest wall tenderness  GI:  Abdomen Soft, mild suprapubic ttp, Non distended. +BS. No organomegaly, no palpable masses,  No rebound, guarding, or rigidity. Musculoskeletal: Moves all extremities x 4. Warm and well perfused, no clubbing, cyanosis, or edema. Capillary refill <3 seconds  Integument: skin warm and dry. No rashes. Lymphatic: no lymphadenopathy noted  Neurologic: GCS 15, no focal deficits, symmetric strength 5/5 in the upper and lower extremities bilaterally  Psychiatric: Normal Affect    -------------------------------------------------- RESULTS -------------------------------------------------  I have personally reviewed all laboratory and imaging results for this patient. Results are listed below.      LABS:  Results for orders placed or performed during the hospital encounter of 06/29/20   CBC auto differential   Result Value Ref Range    WBC 3.9 (L) 4.5 - 11.5 E9/L    RBC 4.42 3.50 - 5.50 E12/L    Hemoglobin 12.8 11.5 - 15.5 g/dL    Hematocrit 38.4 34.0 - 48.0 %    MCV 86.9 80.0 - 99.9 fL    MCH 29.0 26.0 - 35.0 pg    MCHC 33.3 32.0 - 34.5 %    RDW 11.6 11.5 - 15.0 fL    Platelets 268 111 - 565 E9/L    MPV 11.5 7.0 - 12.0 fL    Neutrophils % 77.2 43.0 - 80.0 %    Lymphocytes % 21.0 20.0 - 42.0 %    Monocytes % 0.0 (L) 2.0 - 12.0 %    Eosinophils % 0.9 0.0 - 6.0 %    Basophils % 0.0 0.0 - 2.0 %    Neutrophils Absolute 3.04 1.80 - 7.30 E9/L    Lymphocytes Absolute 0.82 (L) 1.50 - 4.00 E9/L    Monocytes Absolute 0.00 (L) 0.10 - 0.95 E9/L    Eosinophils Absolute 0.04 (L) 0.05 - 0.50 E9/L    Basophils Absolute 0.00 0.00 - 0.20 E9/L    Metamyelocytes Relative 0.9 0.0 - 1.0 %    nRBC 0.0 /100 WBC    Vacuolated Neutrophils 2+     Polychromasia 1+    Comprehensive Metabolic Panel w/ mg/dL    Acetaminophen Level <5.0 (L) 10.0 - 64.9 mcg/mL    Salicylate, Serum <4.0 0.0 - 30.0 mg/dL   Urine Drug Screen   Result Value Ref Range    Amphetamine Screen, Urine NOT DETECTED Negative <1000 ng/mL    Barbiturate Screen, Ur NOT DETECTED Negative < 200 ng/mL    Benzodiazepine Screen, Urine NOT DETECTED Negative < 200 ng/mL    Cannabinoid Scrn, Ur NOT DETECTED Negative < 50ng/mL    Cocaine Metabolite Screen, Urine NOT DETECTED Negative < 300 ng/mL    Opiate Scrn, Ur NOT DETECTED Negative < 300ng/mL    PCP Screen, Urine NOT DETECTED Negative < 25 ng/mL    Methadone Screen, Urine NOT DETECTED Negative <300 ng/mL    Oxycodone Urine NOT DETECTED Negative <100 ng/mL    FENTANYL SCREEN, URINE NOT DETECTED Negative <1 ng/mL    Drug Screen Comment: see below        RADIOLOGY:  Interpreted by Radiologist.  US ABDOMEN LIMITED   Final Result   Ill-defined liver lesion which is indeterminate. CT-guided core biopsy   is recommended               XR CHEST PORTABLE   Final Result   Normal chest               CT ABDOMEN PELVIS WO CONTRAST Additional Contrast? None   Final Result   Complex lesion within the right lobe of the liver. This is   indeterminant. Recommend follow-up postcontrast. Malignancy cannot be   excluded. XR CHEST PORTABLE    (Results Pending)       EKG: This EKG is signed and interpreted by the EP. Time: Rate:   Rhythm:   Interpretation:   Comparison:       ------------------------- NURSING NOTES AND VITALS REVIEWED ---------------------------   The nursing notes within the ED encounter and vital signs as below have been reviewed by myself. BP 96/61   Pulse 120   Temp 98.6 °F (37 °C)   Resp 20   Ht 5' 1\" (1.549 m)   Wt 138 lb (62.6 kg)   LMP 06/08/2020   SpO2 97%   BMI 26.07 kg/m²   Oxygen Saturation Interpretation: Normal    The patients available past medical records and past encounters were reviewed.         ------------------------------ ED COURSE/MEDICAL DECISION MAKING----------------------  Medications   0.9 % sodium chloride infusion ( Intravenous New Bag 6/29/20 1438)   lidocaine-EPINEPHrine 1 percent-1:142225 injection (has no administration in time range)   ampicillin-sulbactam (UNASYN) 3 g ivpb minibag (has no administration in time range)   metronidazole (FLAGYL) 500 mg in NaCl 100 mL IVPB premix (has no administration in time range)   0.9 % sodium chloride bolus (0 mLs Intravenous Stopped 6/29/20 1108)   ondansetron (ZOFRAN) injection 4 mg (4 mg Intravenous Given 6/29/20 0938)   acetaminophen (TYLENOL) tablet 1,000 mg (1,000 mg Oral Given 6/29/20 0938)   0.9 % sodium chloride IV bolus 1,878 mL (0 mLs Intravenous Stopped 6/29/20 1305)   cefTRIAXone (ROCEPHIN) 1 g in sterile water 10 mL IV syringe (0 g Intravenous Stopped 6/29/20 1145)         ED COURSE:   results noted, elev lactic with fever. Pt lower abd pain. Pt tachy, persised despited ivf, and transienty hypotension that responded to ivf. Ct noted, hospitalist consulted for admission,  Reviewed films with hospitalist, ? Etiology of liver mass, will admit to icu. Spoke with intensivist, concerned for liver abscess, id consulted, admit to icu    Medical Decision Making:      PROCEDURE  6/29/20       Time: [address]  Risks, benefits and alternatives (for applicable procedures below) described. Performed By: Jermain Ling MD.    Indication: vascular access and inability to gain peripheral access. Informed consent: by patient or legal gardian. Procedure: After routine sterile preparation, a left 3-Lumen 7F Central Venous Catheter was placed by internal jugular vein approach and secured by standard fashion. Ultrasound Guidance:   not used. Number of Attempts: 1    Post-procedure Findings: A post procedural chest x-ray  was ordered and showed good line position. Patient tolerated the procedure well.            This patient's ED course included: a personal history and physicial examination    This patient has remained hemodynamically stable, been closely monitored and initially deteriorated, but then stabilized during their ED course. Re-Evaluations:             Re-evaluation. Patients symptoms are improving    Re-examination  6/29/20   9:19 AM EDT          Vital Signs:   Vitals:    06/29/20 1313 06/29/20 1400 06/29/20 1402 06/29/20 1530   BP: 91/60 (!) 96/59 102/62 96/61   Pulse: 118 103 98 120   Resp: 20 18 20   Temp: 98.1 °F (36.7 °C)  98 °F (36.7 °C) 98.6 °F (37 °C)   TempSrc: Oral  Oral    SpO2: 99% 98% 98% 97%   Weight:       Height:         Card/Pulm:  Rhythm: rapid rate. Heart Sounds: no murmurs, gallops, or rubs. clear to auscultation, no wheezes or rales and unlabored breathing. Capillary Refill: normal.  Radial Pulse:  equal.  Skin:  Warm. Consultations:             hospitalist  Critical care  Infectious disease    Critical Care: 35 min        Counseling: The emergency provider has spoken with the patient and discussed todays results, in addition to providing specific details for the plan of care and counseling regarding the diagnosis and prognosis. Questions are answered at this time and they are agreeable with the plan.       --------------------------------- IMPRESSION AND DISPOSITION ---------------------------------    IMPRESSION  1. Sepsis, due to unspecified organism, unspecified whether acute organ dysfunction present ([facility]    2. Liver abscess        DISPOSITION  Disposition: Admit to CCU/ICU  Patient condition is serious    NOTE: This report was transcribed using voice recognition software.  Every effort was made to ensure accuracy; however, inadvertent computerized transcription errors may be present        Eamon Molina MD  06/29/20 1600

## 2020-06-29 NOTE — ED NOTES
Report called to ICU spoke with [facility]. Patient to be transported.       Jossue Walker RN  06/29/20 0942

## 2020-06-29 NOTE — ED NOTES
Bed: 11  Expected date:   Expected time:   Means of arrival:   Comments:  ems     Peyton Bolaños RN  06/29/20 9212

## 2020-06-29 NOTE — CONSULTS
(rDNA), dextrose, acetaminophen  IV Drips/Infusions   sodium chloride 125 mL/hr at 06/29/20 1438    dextrose       Home Medications  Medications Prior to Admission: glimepiride (AMARYL) 4 MG tablet, Take 4 mg by mouth every morning (before breakfast)  acetaminophen (TYLENOL) 325 MG tablet, Take 650 mg by mouth every 6 hours as needed for Pain  levothyroxine (SYNTHROID) 25 MCG tablet, Take 1 tablet by mouth daily  Dulaglutide (TRULICITY) 5.38 GD/3.1SF SOPN, Inject 0.75 mg into the skin once a week (Patient taking differently: Inject 0.75 mg into the skin once a week NEW MEDICINE AND IS PICKING UP TODAY. Has not administered yet)  atorvastatin (LIPITOR) 20 MG tablet, Take 1 tablet by mouth daily (Patient taking differently: Take 20 mg by mouth nightly )  fluticasone (FLONASE) 50 MCG/ACT nasal spray, 2 sprays by Each Nostril route daily  pantoprazole (PROTONIX) 40 MG tablet, Take 1 tablet by mouth every morning (before breakfast)  ibuprofen (ADVIL;MOTRIN) 600 MG tablet, Take 1 tablet by mouth 3 times daily as needed for Pain  glimepiride (AMARYL) 4 MG tablet, Take 1 tablet by mouth every morning (before breakfast)  Blood Glucose Monitoring Suppl (FREESTYLE FREEDOM LITE) w/Device KIT, 1 kit by Does not apply route daily  FreeStyle Lancets MISC, 1 each by Does not apply route daily  blood glucose test strips (FREESTYLE LITE) strip, 1 each by In Vitro route daily As needed. Diet/Nutrition   DIET CARB CONTROL; Diet NPO, After Midnight    Allergies   Iodine and Shellfish-derived products    Social History   Tobacco   reports that she has never smoked. She has never used smokeless tobacco.    Alcohol     reports no history of alcohol use.     Occupational history :    Family History         Problem Relation Age of Onset    Other Mother         aneurysm    Stroke Father     Heart Attack Father     No Known Problems Sister     Diabetes Brother     Heart Attack Brother 48    No Known Problems Sister        Sleep History   n/a    ROS     REVIEW OF SYSTEMS:  CONSTITUTIONAL:  positive for  fevers, chills, sweats and fatigue  negative for  weight loss  EYES:  negative for  double vision and blurred vision  HEENT:  negative for  epistaxis, sore throat and hoarseness  RESPIRATORY:  negative for  dyspnea, wheezing, hemoptysis and chest pain  CARDIOVASCULAR:  negative for  chest pain, dyspnea, palpitations  GASTROINTESTINAL:  positive for abdominal pain  negative for nausea, vomiting, diarrhea, constipation and jaundice  GENITOURINARY:  negative for dysuria and hematuria  MUSCULOSKELETAL:  negative for  myalgias, arthralgias and pain  NEUROLOGICAL:  negative for headaches, dizziness, seizures and syncope    Lines and Devices    Left IJ    Mechanical Ventilation Data   VENT SETTINGS (Comprehensive)  Vent Information  SpO2: 97 %  Additional Respiratory  Assessments  Pulse: 120  Resp: 20  SpO2: 97 %    ABG  No results found for: PH, PCO2, PO2, HCO3, O2SAT  No results found for: IFIO2, MODE, SETTIDVOL, SETPEEP        Vitals    height is 5' 1\" (1.549 m) and weight is 138 lb (62.6 kg). Her temperature is 98.6 °F (37 °C). Her blood pressure is 96/61 and her pulse is 120. Her respiration is 20 and oxygen saturation is 97%.        Temperature Range: Temp: 98.6 °F (37 °C) Temp  Av.6 °F (37.6 °C)  Min: 98 °F (36.7 °C)  Max: 103 °F (39.4 °C)  BP Range:  Systolic (94EVE), UZA:95 , Min:83 , NYW:419     Diastolic (44STK), JSC:14, Min:50, Max:65    Pulse Range: Pulse  Av.3  Min: 98  Max: 120  Respiration Range: Resp  Av  Min: 16  Max: 20  Current Pulse Ox[de-identified]  SpO2: 97 %  24HR Pulse Ox Range:  SpO2  Av.8 %  Min: 96 %  Max: 99 %  Oxygen Amount and Delivery:        I/O (24 Hours)    Patient Vitals for the past 8 hrs:   BP Temp Temp src Pulse Resp SpO2 Height Weight   20 1530 96/61 98.6 °F (37 °C) -- 120 20 97 % -- --   20 1402 102/62 98 °F (36.7 °C) Oral 98 18 98 % -- --   20 1400 (!) 96/59 -- -- 103 -- 98 % -- -- 06/29/20 1313 91/60 98.1 °F (36.7 °C) Oral 118 20 99 % -- --   06/29/20 1300 89/62 -- -- 114 18 99 % -- --   06/29/20 1200 (!) 83/50 -- -- 112 20 99 % -- --   06/29/20 1111 -- 100.1 °F (37.8 °C) -- -- -- -- -- --   06/29/20 1100 93/65 -- -- 112 20 96 % -- --   06/29/20 1000 (!) 107/59 -- -- 118 20 97 % -- --   06/29/20 0907 (!) 116/54 103 °F (39.4 °C) Oral 116 16 97 % 5' 1\" (1.549 m) 138 lb (62.6 kg)     No intake or output data in the 24 hours ending 06/29/20 1705  No intake/output data recorded. Patient Vitals for the past 96 hrs (Last 3 readings):   Weight   06/29/20 0907 138 lb (62.6 kg)         Drains/Tubes Outputs  None  Exam     PHYSICAL EXAM:  CONSTITUTIONAL:  awake, alert, cooperative, no apparent distress, and appears stated age  EYES:  Lids and lashes normal, pupils equal, round and reactive to light, extra ocular muscles intact, sclera clear, conjunctiva normal  ENT:  normocepalic, without obvious abnormality, atraumatic. Left IJ in place on neck  LUNGS:  No increased work of breathing, good air exchange, clear to auscultation bilaterally, no crackles or wheezing  CARDIOVASCULAR:  Normal apical impulse, regular rate and rhythm, normal S1 and S2, no S3 or S4, and no murmur noted  ABDOMEN:  No scars, normal bowel sounds, soft, non-distended, mild RUQ tenderness, no masses palpated, no hepatosplenomegally  MUSCULOSKELETAL:  there is no redness, warmth, or swelling of the joints  full range of motion noted  motor strength is 5 out of 5 all extremities bilaterally  tone is normal  NEUROLOGIC:  Awake, alert, oriented to name, place and time. Cranial nerves II-XII are grossly intact.     SKIN:  no bruising or bleeding, normal skin color, texture, turgor, no redness, warmth, or swelling, no rashes and no lesions     Data   Old records and images have been reviewed    Lab Results   CBC     Lab Results   Component Value Date    WBC 3.9 06/29/2020    RBC 4.42 06/29/2020    HGB 12.8 06/29/2020    HCT 38.4 06/29/2020     06/29/2020    MCV 86.9 06/29/2020    MCH 29.0 06/29/2020    MCHC 33.3 06/29/2020    RDW 11.6 06/29/2020    NRBC 0.0 06/29/2020    METASPCT 0.9 06/29/2020    LYMPHOPCT 21.0 06/29/2020    MONOPCT 0.0 06/29/2020    BASOPCT 0.0 06/29/2020    MONOSABS 0.00 06/29/2020    LYMPHSABS 0.82 06/29/2020    EOSABS 0.04 06/29/2020    BASOSABS 0.00 06/29/2020       BMP   Lab Results   Component Value Date     06/29/2020    K 4.1 06/29/2020    CL 97 06/29/2020    CO2 22 06/29/2020    BUN 11 06/29/2020    CREATININE 0.7 06/29/2020    GLUCOSE 299 06/29/2020    CALCIUM 9.2 06/29/2020       LFTS  Lab Results   Component Value Date    ALKPHOS 366 06/29/2020    ALT 53 06/29/2020    AST 61 06/29/2020    PROT 8.4 06/29/2020    BILITOT 1.0 06/29/2020    LABALBU 3.6 06/29/2020       INR  Recent Labs     06/29/20  0942   PROTIME 12.8*   INR 1.1       APTT  Recent Labs     06/29/20  0942   APTT 27.1       Lactic Acid  No results found for: LACTA     BNP   No results for input(s): BNP in the last 72 hours. Cultures     No results for input(s): BC in the last 72 hours. No results for input(s): Viviane Poet in the last 72 hours. No results for input(s): LABURIN in the last 72 hours. Radiology     XR CHEST PORTABLE   Final Result      Status post placement of left IJ central venous catheter with distal   tip at level of right atrial/caval junction or right atrium. US ABDOMEN LIMITED   Final Result   Ill-defined liver lesion which is indeterminate. CT-guided core biopsy   is recommended               XR CHEST PORTABLE   Final Result   Normal chest               CT ABDOMEN PELVIS WO CONTRAST Additional Contrast? None   Final Result   Complex lesion within the right lobe of the liver. This is   indeterminant. Recommend follow-up postcontrast. Malignancy cannot be   excluded.                SYSTEMS ASSESSMENT    Neuro   Awake and alert and oriented    Respiratory   On Room air  CXR unremarkable with IJ in place  Wean oxygen as tolerated. Keep O2 sat 90-92%    Cardiovascular   No cardiac complaints  History of low blood pressure - Systolic in the 57R, no requirement of pressors    Gastrointestinal   Ill defined liver lesion seen on US and CT of Abdomen  Obtain liver serology and AFP, CEA, Ferritin, A1AT, Ammonia  Send out amoebic antibody titers  Lactic Acidosis - Trending down after fluids, Cycle lactate until Normalized  On IV NS  Ok for diet today  NPO after midnight for IR biopsy/drainage  Protonix daily for Hx of GERD    Renal   Renal function normal  Continue to monitor      Infectious Disease   On Unasyn and Flagyl  ? Sepsis - Has low baseline blood pressure  Febrile - Tylenol PRN  Amoebic titers as above  ID following  Blood cultures drawn and pending    Hematology/Oncology   WBC count 3.9  Leukopenia - trend with CBC  Lovenox for DVT ppx   - Hold for procedure    Endocrine   DM  Sliding scale - Low dose currently but increase as needed  Synthroid for hx of hypothyroid    Social/Spiritual/DNR/Other   Full Code    Meron Ellis MD  Resident, PGY-1  6/29/2020  5:05 PM

## 2020-06-29 NOTE — H&P
[facility] History and Physical      CHIEF COMPLAINT:  Abdominal Pain (Lower abdominal pain x six days, EMS states she was having an anxiety attack. Patient states she is not currently having pain or anxiety) and Fever (103.0 during triage, patient states chills.  )    History of Present Illness: 69-year-old female with a past medical history of type 2 diabetes, GERD, bilateral carpal tunnel presenting with abdominal pain that is been going on over the last 6 days. States it is primarily on the left lower abdomen across her abdomen. Seems to be moving from the left upper quadrant into the back as well. Associated with some nausea. No vomiting. EMS was called and they stated she was having an anxiety attack. Patient was found to be febrile  presenting to the ER at 103. She states that this abdominal pain is not similar to the one from a few months ago because that one resolved with a PPI or the medicine her PCP gave her. Denies any constipation, diarrhea. Denies any history of gallstones. Denies any history of liver abnormalities that she is aware of. She states that her diabetes has overall been well controlled over this time period. Denies any sick contacts. Denies any traveling. Denies any new foods or other changes in the last week prior to the onset of symptoms. Denies any chest pain, shortness of breath. In the ED patient was found to be febrile as mentioned. CT abdomen was pertinent for a complex lesion in her liver. Labs were remarkable for hyper glycemia but no anion gap. Also found to have a lactic acid of 4.3. Was also hypotensive even with 30 cc/kilogram bolus of fluid. Will admit to MICU for close observation.     Informant(s) for H&P: Patient    REVIEW OF SYSTEMS:  A comprehensive review of systems was negative except for: what is in the HPI    PMH:  Past Medical History:   Diagnosis Date    Carpal tunnel syndrome, right     Diabetes mellitus ([facility] [address]     GERD (gastroesophageal reflux disease)     Hyperlipidemia     Hypothyroidism        Surgical History:  Past Surgical History:   Procedure Laterality Date    CARPAL TUNNEL RELEASE Right 3/18/2020    RIGHT CARPAL TUNNEL RELEASE performed by Chester Boo MD at [facility] OR       Medications Prior to Admission:    Prior to Admission medications    Medication Sig Start Date End Date Taking? Authorizing Provider   levothyroxine (SYNTHROID) 25 MCG tablet Take 1 tablet by mouth daily 6/26/20   Gabrielle Buck, DO   glimepiride (AMARYL) 4 MG tablet Take 1 tablet by mouth every morning (before breakfast) 6/26/20   Lio Spaulding, DO   Dulaglutide (TRULICITY) 5.96 OS/8.1GN SOPN Inject 0.75 mg into the skin once a week 6/26/20   Gabrielle Buck, DO   atorvastatin (LIPITOR) 20 MG tablet Take 1 tablet by mouth daily 6/18/20   Gabrielle Buck, DO   fluticasone (FLONASE) 50 MCG/ACT nasal spray 2 sprays by Each Nostril route daily 6/18/20   Lio Verdugo,    pantoprazole (PROTONIX) 40 MG tablet Take 1 tablet by mouth every morning (before breakfast) 6/18/20   Lio Spaulding, DO   ibuprofen (ADVIL;MOTRIN) 600 MG tablet Take 1 tablet by mouth 3 times daily as needed for Pain 6/15/20   Chester Boo MD   Blood Glucose Monitoring Suppl (FREESTYLE FREEDOM LITE) w/Device KIT 1 kit by Does not apply route daily 3/4/20   Gabrielle Buck, DO   FreeStyle Lancets MISC 1 each by Does not apply route daily 3/4/20   Lio Quezada, DO   blood glucose test strips (FREESTYLE LITE) strip 1 each by In Vitro route daily As needed. 3/4/20   Lio Spaulding DO       Allergies:    Iodine and Shellfish-derived products    Social History:    reports that she has never smoked. She has never used smokeless tobacco. She reports that she does not drink alcohol or use drugs. Family History:   family history is not on file.      PHYSICAL EXAM:  Vitals:  BP 91/60   Pulse 118   Temp 98.1 °F (36.7 °C) (Oral)   Resp 20   Ht 5' 1\" (1.549 m)   Wt 138 lb (62.6 kg)   SpO2 99%   BMI 26.07 kg/m²   General Appearance: alert and oriented to person, place and time and in no acute distress  Skin: warm and dry  Head: normocephalic and atraumatic  Eyes: pupils equal, round, and reactive to light, extraocular eye movements intact, conjunctivae normal  Neck: neck supple and non tender without mass   Pulmonary/Chest: clear to auscultation bilaterally- no wheezes, rales or rhonchi, normal air movement, no respiratory distress  Cardiovascular: normal rate, normal S1 and S2 and no carotid bruits  Abdomen: soft, tender throughout with more tenderness to palpation on the left lower and left upper quadrants, negative Chowdhury sign, negative Rovsing's, negative rebound tenderness  Extremities: no cyanosis, no clubbing and no edema  Neurologic: no cranial nerve deficit and speech normal    LABS:  Recent Labs     06/29/20  0942      K 4.1   CL 97*   CO2 22   BUN 11   CREATININE 0.7   GLUCOSE 299*   CALCIUM 9.2       Recent Labs     06/29/20  0942   WBC 3.9*   RBC 4.42   HGB 12.8   HCT 38.4   MCV 86.9   MCH 29.0   MCHC 33.3   RDW 11.6      MPV 11.5       No results for input(s): POCGLU in the last 72 hours. Radiology:   XR CHEST PORTABLE   Final Result   Normal chest               CT ABDOMEN PELVIS WO CONTRAST Additional Contrast? None   Final Result   Complex lesion within the right lobe of the liver. This is   indeterminant. Recommend follow-up postcontrast. Malignancy cannot be   excluded. US ABDOMEN LIMITED    (Results Pending)     ASSESSMENT:      Principal Problem:    Sepsis ([address]  Active Problems:    Type 2 diabetes mellitus without complication, without long-term current use of insulin (HCC)    Abdominal pain    Liver lesion    SIRS (systemic inflammatory response syndrome) ([address]  Resolved Problems:    * No resolved hospital problems. *    PLAN:  1. Sepsis: Secondary to complex lesion in the liver possibly. Right upper quadrant ultrasound.   GI consult. Will consider CTA triphasic. Blood cultures pending. Rocephin started by ER. Will start Zosyn instead for more broad-spectrum anaerobic coverage until blood cultures come back. Consult ID. Continue IV fluids. Will admit to MICU since blood pressure is not improved with boluses of fluid and might need pressors. 2.  Lactic acidosis secondary to above: Continue IVF. Repeat until normal.    3.  Complex liver lesion: Check GGT. Right upper quadrant ultrasound. Acute otitis panel. CEA and other liver work-up for possible malignancy. 4.  Lymphopenia: Primarily lymphocytes on differential.  Will get a peripheral blood smear. Possibly blood dyscrasia from problem 1.    5.  Diabetes type 2: Patient on sulfonylurea and Trulicity as outpatient. Will hold for now. Will put on a low-dose SSI while inpatient. No signs of pancreatitis on CT abdomen.     6.  GERD: Continue Protonix    Code Status: full code   DVT prophylaxis: lovenox     Electronically signed by Brandie Ospina MD on 6/29/2020 at 12:50 PM

## 2020-06-30 ENCOUNTER — APPOINTMENT (OUTPATIENT)
Dept: CT IMAGING | Age: 40
DRG: 871 | End: 2020-06-30
Payer: COMMERCIAL

## 2020-06-30 ENCOUNTER — APPOINTMENT (OUTPATIENT)
Dept: MRI IMAGING | Age: 40
DRG: 871 | End: 2020-06-30
Payer: COMMERCIAL

## 2020-06-30 LAB
ACINETOBACTER BAUMANNII BY PCR: NOT DETECTED
AMMONIA: 28.3 UMOL/L (ref 11–51)
AMYLASE: 34 U/L (ref 20–100)
ANION GAP SERPL CALCULATED.3IONS-SCNC: 10 MMOL/L (ref 7–16)
BASOPHILS ABSOLUTE: 0 E9/L (ref 0–0.2)
BASOPHILS RELATIVE PERCENT: 0 % (ref 0–2)
BOTTLE TYPE: ABNORMAL
BUN BLDV-MCNC: 9 MG/DL (ref 6–20)
C-REACTIVE PROTEIN: 17.7 MG/DL (ref 0–0.4)
CALCIUM SERPL-MCNC: 8.4 MG/DL (ref 8.6–10.2)
CANDIDA ALBICANS BY PCR: NOT DETECTED
CANDIDA GLABRATA BY PCR: NOT DETECTED
CANDIDA KRUSEI BY PCR: NOT DETECTED
CANDIDA PARAPSILOSIS BY PCR: NOT DETECTED
CANDIDA TROPICALIS BY PCR: NOT DETECTED
CARBAPENEM RESISTANCE KPC BY PCR: NOT DETECTED
CHLORIDE BLD-SCNC: 105 MMOL/L (ref 98–107)
CO2: 22 MMOL/L (ref 22–29)
CREAT SERPL-MCNC: 0.6 MG/DL (ref 0.5–1)
ENTEROBACTER CLOACAE COMPLEX BY PCR: NOT DETECTED
ENTEROBACTERALES BY PCR: DETECTED
ENTEROCOCCUS BY PCR: NOT DETECTED
EOSINOPHILS ABSOLUTE: 0 E9/L (ref 0.05–0.5)
EOSINOPHILS RELATIVE PERCENT: 0 % (ref 0–6)
ESCHERICHIA COLI BY PCR: NOT DETECTED
GFR AFRICAN AMERICAN: >60
GFR NON-AFRICAN AMERICAN: >60 ML/MIN/1.73
GLUCOSE BLD-MCNC: 200 MG/DL (ref 74–99)
HAEMOPHILUS INFLUENZAE BY PCR: NOT DETECTED
HAV IGM SER IA-ACNC: NORMAL
HCT VFR BLD CALC: 32.9 % (ref 34–48)
HEMOGLOBIN: 11.1 G/DL (ref 11.5–15.5)
HEPATITIS B CORE IGM ANTIBODY: NORMAL
HEPATITIS B SURFACE ANTIGEN INTERPRETATION: NORMAL
HEPATITIS C ANTIBODY INTERPRETATION: NORMAL
INR BLD: 1.3
KLEBSIELLA OXYTOCA BY PCR: NOT DETECTED
KLEBSIELLA PNEUMONIAE GROUP BY PCR: DETECTED
LACTIC ACID: 1.4 MMOL/L (ref 0.5–2.2)
LACTIC ACID: 1.9 MMOL/L (ref 0.5–2.2)
LIPASE: 11 U/L (ref 13–60)
LISTERIA MONOCYTOGENES BY PCR: NOT DETECTED
LYMPHOCYTES ABSOLUTE: 1.26 E9/L (ref 1.5–4)
LYMPHOCYTES RELATIVE PERCENT: 10 % (ref 20–42)
MCH RBC QN AUTO: 29.3 PG (ref 26–35)
MCHC RBC AUTO-ENTMCNC: 33.7 % (ref 32–34.5)
MCV RBC AUTO: 86.8 FL (ref 80–99.9)
METER GLUCOSE: 142 MG/DL (ref 74–99)
METER GLUCOSE: 159 MG/DL (ref 74–99)
METER GLUCOSE: 206 MG/DL (ref 74–99)
METER GLUCOSE: 232 MG/DL (ref 74–99)
MONOCYTES ABSOLUTE: 0.25 E9/L (ref 0.1–0.95)
MONOCYTES RELATIVE PERCENT: 2 % (ref 2–12)
NEISSERIA MENINGITIDIS BY PCR: NOT DETECTED
NEUTROPHILS ABSOLUTE: 11.09 E9/L (ref 1.8–7.3)
NEUTROPHILS RELATIVE PERCENT: 88 % (ref 43–80)
ORDER NUMBER: ABNORMAL
PATHOLOGIST REVIEW: NORMAL
PDW BLD-RTO: 12.1 FL (ref 11.5–15)
PLATELET # BLD: 156 E9/L (ref 130–450)
PMV BLD AUTO: 11.8 FL (ref 7–12)
POTASSIUM REFLEX MAGNESIUM: 3.6 MMOL/L (ref 3.5–5)
PROTEUS BY PCR: NOT DETECTED
PROTHROMBIN TIME: 15.3 SEC (ref 9.3–12.4)
PSEUDOMONAS AERUGINOSA BY PCR: NOT DETECTED
RBC # BLD: 3.79 E12/L (ref 3.5–5.5)
RBC # BLD: NORMAL 10*6/UL
SERRATIA MARCESCENS BY PCR: NOT DETECTED
SODIUM BLD-SCNC: 137 MMOL/L (ref 132–146)
SOURCE OF BLOOD CULTURE: ABNORMAL
STAPHYLOCOCCUS AUREUS BY PCR: NOT DETECTED
STAPHYLOCOCCUS SPECIES BY PCR: NOT DETECTED
STREPTOCOCCUS AGALACTIAE BY PCR: NOT DETECTED
STREPTOCOCCUS PNEUMONIAE BY PCR: NOT DETECTED
STREPTOCOCCUS PYOGENES  BY PCR: NOT DETECTED
STREPTOCOCCUS SPECIES BY PCR: NOT DETECTED
URINE CULTURE, ROUTINE: NORMAL
WBC # BLD: 12.6 E9/L (ref 4.5–11.5)

## 2020-06-30 PROCEDURE — 2580000003 HC RX 258: Performed by: INTERNAL MEDICINE

## 2020-06-30 PROCEDURE — 6370000000 HC RX 637 (ALT 250 FOR IP): Performed by: INTERNAL MEDICINE

## 2020-06-30 PROCEDURE — 82962 GLUCOSE BLOOD TEST: CPT

## 2020-06-30 PROCEDURE — 83605 ASSAY OF LACTIC ACID: CPT

## 2020-06-30 PROCEDURE — 6360000004 HC RX CONTRAST MEDICATION: Performed by: RADIOLOGY

## 2020-06-30 PROCEDURE — 82105 ALPHA-FETOPROTEIN SERUM: CPT

## 2020-06-30 PROCEDURE — 74183 MRI ABD W/O CNTR FLWD CNTR: CPT

## 2020-06-30 PROCEDURE — 6360000002 HC RX W HCPCS: Performed by: STUDENT IN AN ORGANIZED HEALTH CARE EDUCATION/TRAINING PROGRAM

## 2020-06-30 PROCEDURE — 82784 ASSAY IGA/IGD/IGG/IGM EACH: CPT

## 2020-06-30 PROCEDURE — 86255 FLUORESCENT ANTIBODY SCREEN: CPT

## 2020-06-30 PROCEDURE — A9581 GADOXETATE DISODIUM INJ: HCPCS | Performed by: RADIOLOGY

## 2020-06-30 PROCEDURE — 85025 COMPLETE CBC W/AUTO DIFF WBC: CPT

## 2020-06-30 PROCEDURE — 85610 PROTHROMBIN TIME: CPT

## 2020-06-30 PROCEDURE — 82787 IGG 1 2 3 OR 4 EACH: CPT

## 2020-06-30 PROCEDURE — 6360000002 HC RX W HCPCS: Performed by: INTERNAL MEDICINE

## 2020-06-30 PROCEDURE — 82103 ALPHA-1-ANTITRYPSIN TOTAL: CPT

## 2020-06-30 PROCEDURE — 82140 ASSAY OF AMMONIA: CPT

## 2020-06-30 PROCEDURE — 86038 ANTINUCLEAR ANTIBODIES: CPT

## 2020-06-30 PROCEDURE — 6370000000 HC RX 637 (ALT 250 FOR IP): Performed by: STUDENT IN AN ORGANIZED HEALTH CARE EDUCATION/TRAINING PROGRAM

## 2020-06-30 PROCEDURE — 82150 ASSAY OF AMYLASE: CPT

## 2020-06-30 PROCEDURE — 2500000003 HC RX 250 WO HCPCS: Performed by: INTERNAL MEDICINE

## 2020-06-30 PROCEDURE — 99233 SBSQ HOSP IP/OBS HIGH 50: CPT | Performed by: INTERNAL MEDICINE

## 2020-06-30 PROCEDURE — 36415 COLL VENOUS BLD VENIPUNCTURE: CPT

## 2020-06-30 PROCEDURE — 2580000003 HC RX 258: Performed by: STUDENT IN AN ORGANIZED HEALTH CARE EDUCATION/TRAINING PROGRAM

## 2020-06-30 PROCEDURE — 2060000000 HC ICU INTERMEDIATE R&B

## 2020-06-30 PROCEDURE — 36592 COLLECT BLOOD FROM PICC: CPT

## 2020-06-30 PROCEDURE — 80048 BASIC METABOLIC PNL TOTAL CA: CPT

## 2020-06-30 PROCEDURE — 83690 ASSAY OF LIPASE: CPT

## 2020-06-30 RX ORDER — ACETAMINOPHEN 650 MG/1
650 SUPPOSITORY RECTAL ONCE
Status: COMPLETED | OUTPATIENT
Start: 2020-06-30 | End: 2020-06-30

## 2020-06-30 RX ORDER — DIPHENHYDRAMINE HCL 25 MG
50 TABLET ORAL ONCE
Status: DISCONTINUED | OUTPATIENT
Start: 2020-06-30 | End: 2020-06-30

## 2020-06-30 RX ADMIN — SODIUM CHLORIDE 3 G: 900 INJECTION INTRAVENOUS at 12:21

## 2020-06-30 RX ADMIN — ACETAMINOPHEN 650 MG: 650 SUPPOSITORY RECTAL at 08:33

## 2020-06-30 RX ADMIN — ONDANSETRON 4 MG: 2 INJECTION INTRAMUSCULAR; INTRAVENOUS at 14:00

## 2020-06-30 RX ADMIN — SODIUM CHLORIDE: 9 INJECTION, SOLUTION INTRAVENOUS at 06:00

## 2020-06-30 RX ADMIN — SODIUM CHLORIDE: 9 INJECTION, SOLUTION INTRAVENOUS at 17:32

## 2020-06-30 RX ADMIN — GADOXETATE DISODIUM 6 ML: 181.43 INJECTION, SOLUTION INTRAVENOUS at 16:51

## 2020-06-30 RX ADMIN — SODIUM CHLORIDE, PRESERVATIVE FREE 10 ML: 5 INJECTION INTRAVENOUS at 09:00

## 2020-06-30 RX ADMIN — INSULIN LISPRO 1 UNITS: 100 INJECTION, SOLUTION INTRAVENOUS; SUBCUTANEOUS at 12:20

## 2020-06-30 RX ADMIN — METRONIDAZOLE 500 MG: 500 INJECTION, SOLUTION INTRAVENOUS at 00:55

## 2020-06-30 RX ADMIN — SODIUM CHLORIDE 3 G: 900 INJECTION INTRAVENOUS at 06:01

## 2020-06-30 RX ADMIN — METRONIDAZOLE 500 MG: 500 INJECTION, SOLUTION INTRAVENOUS at 08:33

## 2020-06-30 RX ADMIN — ONDANSETRON 4 MG: 2 INJECTION INTRAMUSCULAR; INTRAVENOUS at 05:52

## 2020-06-30 RX ADMIN — SODIUM CHLORIDE 3 G: 900 INJECTION INTRAVENOUS at 17:32

## 2020-06-30 RX ADMIN — ACETAMINOPHEN 650 MG: 650 SUPPOSITORY RECTAL at 14:45

## 2020-06-30 RX ADMIN — INSULIN LISPRO 2 UNITS: 100 INJECTION, SOLUTION INTRAVENOUS; SUBCUTANEOUS at 08:20

## 2020-06-30 ASSESSMENT — PAIN SCALES - GENERAL
PAINLEVEL_OUTOF10: 0
PAINLEVEL_OUTOF10: 0
PAINLEVEL_OUTOF10: 7
PAINLEVEL_OUTOF10: 1
PAINLEVEL_OUTOF10: 0

## 2020-06-30 ASSESSMENT — PAIN DESCRIPTION - PAIN TYPE: TYPE: ACUTE PAIN

## 2020-06-30 ASSESSMENT — PAIN DESCRIPTION - FREQUENCY: FREQUENCY: CONTINUOUS

## 2020-06-30 ASSESSMENT — PAIN DESCRIPTION - ORIENTATION: ORIENTATION: MID

## 2020-06-30 ASSESSMENT — PAIN DESCRIPTION - LOCATION: LOCATION: HEAD

## 2020-06-30 NOTE — PROGRESS NOTES
Report called to 4W. RN aware that patient is refusing triphasic CTA of the liver, as well as premedication. Spoke with Dr. Sharyle Hazy, he is willing to speak with patient. Also updated 4W RN that patient has 4 out of 4 positive blood cultures and a page has been sent to Dr. Jeffery Huff.   Otis Tang  12:47 PM  6/30/2020

## 2020-06-30 NOTE — CARE COORDINATION
Met with patient in intensive care unit. Pt lives with her  and children. Independent, no home care needs. PCP is Dr Henrietta Stewart. Discussed possible need for iv antibiotics. Pt for ct of abdomen.  Will follow-CHRIS

## 2020-06-30 NOTE — PROGRESS NOTES
Critical Care Team: Sign off           Patient transferred from the ICU. .. Please advise if ongoing pulmonary care is needed. Thanks.     Angelique Mejia M.D., F.C.C.P.  [facility] Intensivist

## 2020-06-30 NOTE — PROGRESS NOTES
[facility] Progress Note    Admitting Date and Time: 6/29/2020  8:57 AM  Admit Dx: Sepsis ([facility] [address] [A41.9]  Sepsis ([facility] [address] [A41.9]    Subjective:  Patient is being followed for Sepsis ([facility] [address] [A41.9]  Sepsis ([facility] [address] [A41.9]     The patient states she is feeling better. She denies any fevers or chills. She states that she had anaphylactic shock when she received iodine and does not want to undergo a triphasic liver CT.      sterile water        levothyroxine  25 mcg Oral Daily    pantoprazole  40 mg Oral QAM AC    sodium chloride flush  10 mL Intravenous 2 times per day    [Held by provider] enoxaparin  40 mg Subcutaneous Daily    insulin lispro  0-6 Units Subcutaneous TID WC    ampicillin-sulbactam  3 g Intravenous Q6H     sodium chloride flush, 10 mL, PRN  magnesium hydroxide, 30 mL, Daily PRN  promethazine, 12.5 mg, Q6H PRN    Or  ondansetron, 4 mg, Q6H PRN  glucose, 15 g, PRN  dextrose, 12.5 g, PRN  glucagon (rDNA), 1 mg, PRN  dextrose, 100 mL/hr, PRN  acetaminophen, 650 mg, Q6H PRN         Objective:    /69   Pulse 88   Temp 98.7 °F (37.1 °C)   Resp 18   Ht 5' 1\" (1.549 m)   Wt 136 lb 0.4 oz (61.7 kg)   LMP 06/08/2020   SpO2 98%   BMI 25.70 kg/m²     Gen: NAD, AAOx3, sitting in bed  HEENT: NCAT  CV: Tachycardia, no m/r/g  Resp: Equal chest rise b/l, CTAB  Abd: Soft, mild diffuse tenderness to palpation, ND  Ext: Good ROM of b/l upper and lower extremities, no BLE edema    Recent Labs     06/29/20  0942 06/30/20  0600    137   K 4.1 3.6   CL 97* 105   CO2 22 22   BUN 11 9   CREATININE 0.7 0.6   GLUCOSE 299* 200*   CALCIUM 9.2 8.4*       Recent Labs     06/29/20  0942 06/30/20  0600   WBC 3.9* 12.6*   RBC 4.42 3.79   HGB 12.8 11.1*   HCT 38.4 32.9*   MCV 86.9 86.8   MCH 29.0 29.3   MCHC 33.3 33.7   RDW 11.6 12.1    156   MPV 11.5 11.8     Assessment:    Principal Problem:    Sepsis (HCC)  Active Problems:    Type 2 diabetes mellitus without complication, without long-term current use of insulin (HCC)    Abdominal pain    Liver lesion    SIRS (systemic inflammatory response syndrome) (HCC)  Resolved Problems:    * No resolved hospital problems. *      Plan:    Liver lesion, sepsis resolved: Abdominal CT revealed a complex 5.1 cm lesion of the right lobe of the liver. MRI of the liver ordered instead of a triphasic liver CT, as the patient is allergic to iodine, will follow up. The patient was febrile this morning and WBC of 12.6 from 3.9. ID following, continue Unasyn. GI following. GERD: Continue home PPI. DM2: Continue insulin sliding scale. Monitor blood sugars. Hypothyroidism: Continue home Synthroid. NOTE: This report was transcribed using voice recognition software. Every effort was made to ensure accuracy; however, inadvertent computerized transcription errors may be present.   Electronically signed by Wili Brown MD on 6/30/2020 at 6:21 PM

## 2020-06-30 NOTE — CONSULTS
Gastroenterology Consult Note   Noy PENA NP-C with Jaja Moreno M.D. Consult Note        Date of Service: 6/30/2020  Reason for Consult: complex liver lesion  Requesting Physician: Aaron Chaudhry    CHIEF COMPLAINT:  Chills, and lower abdominal pain    History Obtained From:  Patient, EMR    HISTORY OF PRESENT ILLNESS:       Peña Uriostegui is a 35 y.o. female with significant past medical history of hypothyroidism, HLD, DM, and R carpal tunnel syndrome admitted via ED for lower abdominal pain beginning 1 week ago. Pt reports to chilling sweats starting this past Tuesday. Reports she was 98 orally at that time. States the chilling sweats continued throughout the week, but never ran a fever. States she was 103 orally when the ambulance arrived to pick her up at home. With lower abdominal pain described as \"cramping and felt rock hard\" rated at a 8/10. States the pain does wrap around into her back at times. Nothing seems to worsen or relieve the abdominal pain while at home. Denies any nausea or vomiting, with a good appetite recently. Denies any unexpected weight loss. Bowels typically move every other day described as \"hard round brown balls\". Denies any melan or hematochezia. Last BM was today \"wet, mushy green\" stool. Denies any prior epsiodes of such, and up until this point was in her normal state of health. Denies any recent sick contacts. Denies ever having a EGD or a colonoscopy too date. Denies any [address] of colon cancer. Admission labs Chloride 97, lactic acid 4.3, , protein 8.4, , ALT 53, AST 61, ,  WBC 3.9, mono 0%, abso lymph 0.82, abso mono 0, abso eosi 0.04, gram  - negrito per [facility] x2. Consultation for complex liver lesion. ABD US: Ill-defined liver lesion which is indeterminate. CT-guided core biopsy is recommended. CT ABD: Complex lesion within the right lobe of the liver. This is indeterminant. Recommend follow-up postcontrast. Malignancy cannot be excluded.  CXR: Status post placement of left IJ central venous catheter with distal tip at level of right atrial/caval junction or right atrium. CXR: Normal chest  Currently, pt reports to feeling better. No complaints at this time. Still with slight lower abdominal pain. Tolerated food yesterday afternoon, with slight nausea. Labs today , calcium 8.4, LFTs just now ordered, WBC 12.6, H&H 11.1 & 32.9, neutro 88.0%, lymph 10%, abso neutro 11.09, abso lymph 1.26, abso eosi 0.00.      Past Medical History:        Diagnosis Date    Carpal tunnel syndrome, right     Diabetes mellitus (HCC)     GERD (gastroesophageal reflux disease)     Hyperlipidemia     Hypothyroidism      Past Surgical History:        Procedure Laterality Date    CARPAL TUNNEL RELEASE Right 3/18/2020    RIGHT CARPAL TUNNEL RELEASE performed by Sonny Membreno MD at [facility] OR     Current Medications:    Current Facility-Administered Medications: sterile water injection, , ,   hydrocortisone sodium succinate PF (SOLU-CORTEF) injection 200 mg, 200 mg, Intravenous, Once **FOLLOWED BY** hydrocortisone sodium succinate PF (SOLU-CORTEF) injection 200 mg, 200 mg, Intravenous, Once **FOLLOWED BY** diphenhydrAMINE (BENADRYL) tablet 50 mg, 50 mg, Oral, Once  0.9 % sodium chloride infusion, , Intravenous, Continuous  levothyroxine (SYNTHROID) tablet 25 mcg, 25 mcg, Oral, Daily  pantoprazole (PROTONIX) tablet 40 mg, 40 mg, Oral, QAM AC  sodium chloride flush 0.9 % injection 10 mL, 10 mL, Intravenous, 2 times per day  sodium chloride flush 0.9 % injection 10 mL, 10 mL, Intravenous, PRN  magnesium hydroxide (MILK OF MAGNESIA) 400 MG/5ML suspension 30 mL, 30 mL, Oral, Daily PRN  promethazine (PHENERGAN) tablet 12.5 mg, 12.5 mg, Oral, Q6H PRN **OR** ondansetron (ZOFRAN) injection 4 mg, 4 mg, Intravenous, Q6H PRN  [Held by provider] enoxaparin (LOVENOX) injection 40 mg, 40 mg, Subcutaneous, Daily  insulin lispro (HUMALOG) injection vial 0-6 Units, 0-6 Units, Subcutaneous, TID WC  glucose (GLUTOSE) 40 % oral gel 15 g, 15 g, Oral, PRN  dextrose 50 % IV solution, 12.5 g, Intravenous, PRN  glucagon (rDNA) injection 1 mg, 1 mg, Intramuscular, PRN  dextrose 5 % solution, 100 mL/hr, Intravenous, PRN  ampicillin-sulbactam (UNASYN) 3 g ivpb minibag, 3 g, Intravenous, Q6H  acetaminophen (TYLENOL) tablet 650 mg, 650 mg, Oral, Q6H PRN    Allergies:  Iodine and Shellfish-derived products    Social History:    Tobacco:  Pt denies  Alcohol:  Pt denies  Illicit Drugs: Pt denies    Family History:   Family History   Problem Relation Age of Onset    Other Mother         aneurysm    Stroke Father     Heart Attack Father     No Known Problems Sister     Diabetes Brother     Heart Attack Brother 48    No Known Problems Sister          REVIEW OF SYSTEMS:    Aside from what was mentioned in the PMH and HPI, essentially unremarkable, all others negative. PHYSICAL EXAM:      Vitals:    /67   Pulse 95   Temp 99.5 °F (37.5 °C) (Oral)   Resp 20   Ht 5' 1\" (1.549 m)   Wt 136 lb 0.4 oz (61.7 kg)   LMP 06/08/2020   SpO2 95%   BMI 25.70 kg/m²       CONSTITUTIONAL:  awake, alert, cooperative, no apparent distress, and appears stated age  EYES:  pupils equal, round and reactive to light, sclera anicteric and conjunctiva normal  ENT:  normocephalic, oral pharynx with dry mucous membranes  NECK:  supple   LUNGS:   clear to auscultation bilaterally.   CARDIOVASCULAR:   regular rate and rhythm, no murmur noted; 2+ pulses; no edema  ABDOMEN:  No scars, normal bowel sounds, softly distended, slight diffuse abdominal tenderness to palpitation, no guarding or reobound, no masses palpated, no hepatosplenomegally  MUSCULOSKELETAL:  full range of motion noted  motor strength is 5 out of 5 all extremities bilaterally  NEUROLOGIC:  Mental Status Exam:  Level of Alertness:   awake  Orientation:   person, place, time  Motor Exam:  Motor exam is symmetrical 5 out of 5 all extremities bilaterally  SKIN:  normal How Severe Is This Condition?: mild skin color, texture, turgor    DATA:    CBC with Differential:    Lab Results   Component Value Date    WBC 12.6 06/30/2020    RBC 3.79 06/30/2020    HGB 11.1 06/30/2020    HCT 32.9 06/30/2020     06/30/2020    MCV 86.8 06/30/2020    MCH 29.3 06/30/2020    MCHC 33.7 06/30/2020    RDW 12.1 06/30/2020    NRBC 0.0 06/29/2020    METASPCT 0.9 06/29/2020    LYMPHOPCT 10.0 06/30/2020    MONOPCT 2.0 06/30/2020    BASOPCT 0.0 06/30/2020    MONOSABS 0.25 06/30/2020    LYMPHSABS 1.26 06/30/2020    EOSABS 0.00 06/30/2020    BASOSABS 0.00 06/30/2020     CMP:    Lab Results   Component Value Date     06/30/2020    K 3.6 06/30/2020     06/30/2020    CO2 22 06/30/2020    BUN 9 06/30/2020    CREATININE 0.6 06/30/2020    GFRAA >60 06/30/2020    LABGLOM >60 06/30/2020    GLUCOSE 200 06/30/2020    PROT 8.4 06/29/2020    LABALBU 3.6 06/29/2020    CALCIUM 8.4 06/30/2020    BILITOT 1.0 06/29/2020    ALKPHOS 366 06/29/2020    AST 61 06/29/2020    ALT 53 06/29/2020     Hepatic Function Panel:    Lab Results   Component Value Date    ALKPHOS 366 06/29/2020    ALT 53 06/29/2020    AST 61 06/29/2020    PROT 8.4 06/29/2020    BILITOT 1.0 06/29/2020    LABALBU 3.6 06/29/2020     PT/INR:    Lab Results   Component Value Date    PROTIME 12.8 06/29/2020    INR 1.1 06/29/2020     PTT:    Lab Results   Component Value Date    APTT 27.1 06/29/2020   [APTT}  Last 3 Troponin:  No results found for: TROPONINI  TSH:    Lab Results   Component Value Date    TSH 1.810 06/29/2020     IRON:    Lab Results   Component Value Date    IRON 14 06/29/2020     Iron Saturation:    Lab Results   Component Value Date    LABIRON 9 06/29/2020     TIBC:    Lab Results   Component Value Date    TIBC 157 06/29/2020     FERRITIN:    Lab Results   Component Value Date    FERRITIN 1,852 06/29/2020     HIV:  No results found for: HIV  AHMET:  No results found for: ANATITER, AHMET  No components found for: CHLPL  Lab Results   Component Value Date    TRIG 162 (H) 2020    TRIG 138 2020     Lab Results   Component Value Date    HDL 39 2020    HDL 61 2020     Lab Results   Component Value Date    LDLCALC 130 (H) 2020    LDLCALC 243 (H) 2020     Lab Results   Component Value Date    LABVLDL 32 2020    LABVLDL 28 2020        Ct Abdomen Pelvis Wo Contrast Additional Contrast? None    Result Date: 2020  Patient MRN:  [id number] : 1980 Age: 35 years Gender: Female Order Date:  2020 9:30 AM EXAM: CT ABDOMEN PELVIS WO CONTRAST Technique: Low-dose CT  acquisition technique included one of following options; 1 . Automated exposure control, 2. Adjustment of MA and or KV according to patient's size. 3. Use of interactive reconstruction. Dosage: 651 mGy-cm. INDICATION:  abd pain abd pain COMPARISON: None FINDINGS:  There is a vague low-attenuation zone in the posterior segment of the right lobe the liver with a curvilinear soft tissue component. The lesion measures at least 5.1 cm. There may be an additional lesion caudally in the posterior segment. Pancreas, spleen and kidneys are normal. No abdominal or pelvic lymphadenopathy or fluid collections are noted. Bowel loops are normal. Bladder is unremarkable     Complex lesion within the right lobe of the liver. This is indeterminant. Recommend follow-up postcontrast. Malignancy cannot be excluded. Xr Chest Portable    Result Date: 2020  Patient MRN:  [id number] : 1980 Age: 35 years Gender: Female Order Date:  2020 3:30 PM EXAM: XR CHEST PORTABLE COMPARISON: Today at 1150hours INDICATION:  cvl placement cvl placement FINDINGS: Status post placement of left IJ central venous catheter with distal tip at the level of atrial/caval junction or right atrium. No pneumothorax. No airspace opacity or pleural effusion. The heart is normal in size.      Status post placement of left IJ central venous catheter with distal tip at level of right atrial/caval junction or right atrium. Xr Chest Portable    Result Date: 2020  Patient MRN:  [id number] : 1980 Age: 35 years Gender: Female Order Date:  2020 11:15 AM EXAM: XR CHEST PORTABLE INDICATION:  fever/lactic elev fever/lactic elev COMPARISON: 2020 FINDINGS:  Heart size is normal. There are no infiltrates or effusions. Normal chest     Us Abdomen Limited    Result Date: 2020  Patient MRN:  [id number] : 1980 Age: 35 years Gender: Female Order Date:  2020 1:00 PM EXAM: US ABDOMEN LIMITED INDICATION:  liver lesion, r/o gallstones liver lesion, r/o gallstones COMPARISON: CT same date FINDINGS:  In the posterior segment of the right lobe, there is a heterogeneous lesion measuring 6.4 x 5.1 cm. It has a central hypoechoic region and a peripheral more soft tissue component. There is no increased vascular flow in the central portion. No other liver lesions are noted. Common bile duct is normal measuring 2.7 cm. Gallbladder is normal. Right kidney is unremarkable. Ill-defined liver lesion which is indeterminate. CT-guided core biopsy is recommended       IMPRESSION:    · Liver lesion  · Febrile with chills (103 PTA)  · Abdominal pain- LUQ & lower abdomen radiating into her back at times  · + BC  · S/P carpal tunnel surgery   · GERD  · Nausea   · Hypotensive- resolved  · DM- defer  · Leukocytosis     RECOMMENDATIONS:      · Triphasic CT today  · IR consulted for hepatic abscess drain  · Liver serology today  · Tumor markers today   · Being premedicated d/t iodine allergy prior to CT  · Antibiotic management per ID services  · Medical management per Primary Care  · Occult stool x1- document in progress notes  · Amylase, lipase today  · PT/INR today & daily  · Continue Protonix PO daily  · Continue to medicate for nausea as ordered  · Continue to trend labs  · Will follow    Thank you very much for your consultation. We will follow closely with you.     Discussed with Dr. Karyle Salm developed by Dr. Quintin Beltran, NP-C 6/30/2020 12:25 PM for Dr. Pat Lynn

## 2020-06-30 NOTE — PROGRESS NOTES
Critical Care Team - Daily Progress Note      Date and time: 6/30/2020 7:51 AM  Patient's name:  Shan Dobson Record Number: [id number]  Patient's account/billing number: [de-identified]  Patient's YOB: 1980  Age: 35 y.o. Date of Admission: 6/29/2020  8:57 AM  Length of stay during current admission: 1      Primary Care Physician: Summer Velez DO  ICU Attending Physician: Dr. Adkins Seat Status: Full Code    Reason for ICU admission: ?Sepsis with hypotension      SUBJECTIVE:     OVERNIGHT EVENTS:       None. Was still febrile.     AWAKE & FOLLOWING COMMANDS:  [] No   [x] Yes    CURRENT VENTILATION STATUS:     [] Ventilator  [] BIPAP  [] Nasal Cannula [x] Room Air      IF INTUBATED, ET TUBE MARKING AT LOWER LIP:       cms    SECRETIONS Amount:  [] Small [] Moderate  [] Large  [x] None  Color:     [] White [] Colored  [] Bloody    SEDATION:  RAAS Score:  [] Propofol gtt  [] Versed gtt  [] Ativan gtt   [x] No Sedation    PARALYZED:  [x] No    [] Yes    DIARRHEA:                [x] No                [] Yes  (C. Difficile status: [] positive                                                                                                                       [] negative                                                                                                                     [] pending)    VASOPRESSORS:  [x] No    [] Yes    If yes -   [] Levophed       [] Dopamine     [] Vasopressin       [] Dobutamine  [] Phenylephrine         [] Epinephrine    CENTRAL LINES:     [] No   [x] Yes   (Date of Insertion:  6/29 )           If yes -     [] Right IJ     [x] Left IJ [] Right Femoral [] Left Femoral                   [] Right Subclavian [] Left Subclavian       UGALDE'S CATHETER:   [x] No   [] Yes  (Date of Insertion:   )     URINE OUTPUT:            [x] Good   [] Low              [] Anuric      OBJECTIVE:     VITAL SIGNS:  /68   Pulse 106   Temp 100.5 °F (38.1 °C) (Skin) Resp (!) 35   Ht 5' 1\" (1.549 m)   Wt 136 lb 0.4 oz (61.7 kg)   LMP 2020   SpO2 98%   BMI 25.70 kg/m²   Tmax over 24 hours:  Temp (24hrs), Av.4 °F (38 °C), Min:98 °F (36.7 °C), Max:103.2 °F (39.6 °C)      Patient Vitals for the past 6 hrs:   BP Temp Temp src Pulse Resp SpO2   20 0700 114/68 -- -- 106 (!) 35 --   20 0600 126/84 100.5 °F (38.1 °C) Skin 115 (!) 34 --   20 0500 (!) 101/57 -- -- 100 21 98 %   20 0400 102/67 99.8 °F (37.7 °C) Skin 91 22 97 %   20 0300 95/61 -- -- 91 28 96 %   20 0200 (!) 103/58 -- -- 101 29 94 %         Intake/Output Summary (Last 24 hours) at 2020 0751  Last data filed at 2020 0100  Gross per 24 hour   Intake 1161 ml   Output 1100 ml   Net 61 ml     Wt Readings from Last 2 Encounters:   20 136 lb 0.4 oz (61.7 kg)   20 138 lb (62.6 kg)     Body mass index is 25.7 kg/m². PHYSICAL EXAMINATION:  CONSTITUTIONAL:  awake, alert, cooperative, no apparent distress, and appears stated age  EYES:  Lids and lashes normal, pupils equal, round and reactive to light, extra ocular muscles intact, sclera clear, conjunctiva normal  ENT:  normocepalic, without obvious abnormality, atraumatic. Left IJ in place on neck  LUNGS:  No increased work of breathing, good air exchange, clear to auscultation bilaterally, no crackles or wheezing  CARDIOVASCULAR:  Normal apical impulse, regular rate and rhythm, normal S1 and S2, no S3 or S4, and no murmur noted  ABDOMEN:  No scars, normal bowel sounds, soft, non-distended, mild RUQ tenderness, no masses palpated, no hepatosplenomegally  MUSCULOSKELETAL:  there is no redness, warmth, or swelling of the joints  full range of motion noted  NEUROLOGIC:  Awake, alert, oriented to name, place and time. Cranial nerves II-XII are grossly intact.     SKIN:  no bruising or bleeding, normal skin color, texture, turgor, no redness, warmth, or swelling, no rashes and no lesions      Any additional physical findings:    MEDICATIONS:    Scheduled Meds:   acetaminophen  650 mg Rectal Once    levothyroxine  25 mcg Oral Daily    pantoprazole  40 mg Oral QAM AC    sodium chloride flush  10 mL Intravenous 2 times per day    [Held by provider] enoxaparin  40 mg Subcutaneous Daily    insulin lispro  0-6 Units Subcutaneous TID WC    ampicillin-sulbactam  3 g Intravenous Q6H    metroNIDAZOLE  500 mg Intravenous Q8H     Continuous Infusions:   sodium chloride 125 mL/hr at 06/30/20 0600    dextrose       PRN Meds:   sodium chloride flush, 10 mL, PRN  magnesium hydroxide, 30 mL, Daily PRN  promethazine, 12.5 mg, Q6H PRN    Or  ondansetron, 4 mg, Q6H PRN  glucose, 15 g, PRN  dextrose, 12.5 g, PRN  glucagon (rDNA), 1 mg, PRN  dextrose, 100 mL/hr, PRN  acetaminophen, 650 mg, Q6H PRN          VENT SETTINGS (Comprehensive) (if applicable):  Vent Information  SpO2: 98 %  Additional Respiratory  Assessments  Pulse: 106  Resp: (!) 35  SpO2: 98 %  Oral Care: Mouth swabbed    ABGs:     Laboratory findings:    Complete Blood Count:   Recent Labs     06/29/20  0942 06/30/20  0600   WBC 3.9* 12.6*   HGB 12.8 11.1*   HCT 38.4 32.9*    156        Last 3 Blood Glucose:   Recent Labs     06/29/20  0942 06/30/20  0600   GLUCOSE 299* 200*        PT/INR:    Lab Results   Component Value Date    PROTIME 12.8 06/29/2020    INR 1.1 06/29/2020     PTT:    Lab Results   Component Value Date    APTT 27.1 06/29/2020       Comprehensive Metabolic Profile:   Recent Labs     06/29/20  0942 06/30/20  0600    137   K 4.1 3.6   CL 97* 105   CO2 22 22   BUN 11 9   CREATININE 0.7 0.6   GLUCOSE 299* 200*   CALCIUM 9.2 8.4*   PROT 8.4*  --    LABALBU 3.6  --    BILITOT 1.0  --    ALKPHOS 366*  --    AST 61*  --    ALT 53*  --       Magnesium: No results found for: MG  Phosphorus: No results found for: PHOS  Ionized Calcium: No results found for: CAION     Urinalysis:     Troponin: No results for input(s): TROPONINI in the last 72 hours.    Microbiology:    Cultures during this admission:     Blood cultures:     Pending            [] None drawn      [] Negative             []  Positive (Details:  )  Urine Culture:    Pending               [] None drawn      [] Negative             []  Positive (Details:  )  Sputum Culture:               [x] None drawn       [] Negative             []  Positive (Details:  )   Endotracheal aspirate:     [x] None drawn       [] Negative             []  Positive (Details:  )     Other pertinent Labs:       Radiology/Imaging:     XR CHEST PORTABLE   Final Result      Status post placement of left IJ central venous catheter with distal   tip at level of right atrial/caval junction or right atrium. US ABDOMEN LIMITED   Final Result   Ill-defined liver lesion which is indeterminate. CT-guided core biopsy   is recommended               XR CHEST PORTABLE   Final Result   Normal chest               CT ABDOMEN PELVIS WO CONTRAST Additional Contrast? None   Final Result   Complex lesion within the right lobe of the liver. This is   indeterminant. Recommend follow-up postcontrast. Malignancy cannot be   excluded.          IR Interventional Radiology Procedure Request    (Results Pending)         Chest Xray (6/30/2020):    ASSESSMENT:     SYSTEMS ASSESSMENT     Neuro   Awake and alert and oriented     Respiratory   On Room air  CXR unremarkable with IJ in place       Cardiovascular   No cardiac complaints  History of low blood pressure - Systolic over 886 this am, no requirement of pressors during admission     Gastrointestinal   Ill defined liver lesion seen on US and CT of Abdomen  Obtain liver serology and AFP, CEA 0.9, Ferritin 1852, A1AT, Ammonia 28.3  Send out amoebic antibody titers  Lactic Acidosis - resolved  On IV NS  IR Biopsy/drainage today if able   - Ordered Triphasic CTA with premedication for Iodine Allergy  Protonix daily for Hx of GERD     Renal   Renal function normal  Continue to monitor Infectious Disease   On Unasyn and Flagyl  ? Sepsis - Has low baseline blood pressure, pressures normal overnight  Febrile overnight - Tylenol PRN  Amoebic titers as above  ID following  Blood cultures drawn and pending     Hematology/Oncology   WBC count 12.6  Leukocytosis - trend with CBC  Lovenox for DVT ppx              - Hold for procedure     Endocrine   DM  Sliding scale - Low dose currently but increase as needed  Synthroid for hx of hypothyroid     Social/Spiritual/DNR/Other   Full Code        PLAN:     WEAN PER PROTOCOL:  [] No   [] Yes  [x] N/A    DISCONTINUE ANY LABS:   [x] No   [] Yes    ICU PROPHYLAXIS:  Stress ulcer:  [x] PPI Agent  [] [id number] [] Sucralfate  [] Other:  VTE:   [x] Enoxaparin (held) [] Unfract. Heparin Subcut  [] EPC Cuffs    NUTRITION:  [x] NPO [] Tube Feeding (Specify: ) [] TPN  [] PO (Diet: Diet NPO, After Midnight)    HOME MEDICATIONS RECONCILED: [] No  [x] Yes    INSULIN DRIP:   [x] No   [] Yes    CONSULTATION NEEDED:  [] No   [x] Yes    FAMILY UPDATED:    [x] No   [] Yes    TRANSFER OUT OF ICU:   [] No   [x] Yes    ADDITIONAL PLAN: transfer out of ICU today after Triphasic CTA  1.    Saul Serrato M.D.                       6/30/2020, 7:51 AM

## 2020-06-30 NOTE — PLAN OF CARE
Problem: Body Temperature - Imbalanced:  Goal: Body temperature is within normal range  6/30/2020 1119 by Hilary lBackmon RN  Outcome: Met This Shift  6/30/2020 0107 by Jes Tarango RN  Outcome: Not Met This Shift  6/29/2020 2153 by Jes Tarango RN  Outcome: Met This Shift     Problem: INFECTION  Goal: Patient exhibits no signs of infection.   6/30/2020 1119 by Hilary Blackmon RN  Outcome: Met This Shift  6/30/2020 0107 by Jes Tarango RN  Outcome: Met This Shift  6/29/2020 2153 by Jes Tarango RN  Outcome: Met This Shift     Problem: Pain:  Goal: Pain level will decrease  Description: Pain level will decrease     6/30/2020 1119 by Hilary Blackmon RN  Outcome: Met This Shift  6/30/2020 0107 by Jes Tarango RN  Outcome: Met This Shift  6/29/2020 2153 by Jes Tarango RN  Outcome: Met This Shift     Problem: FALL RISK  Goal: Absence of falls  6/30/2020 1119 by Hilary Blackmon RN  Outcome: Met This Shift  6/30/2020 0107 by Jes Tarango RN  Outcome: Met This Shift  6/29/2020 2153 by Jes Tarango RN  Outcome: Met This Shift     Problem: Safety:  Goal: Free from accidental physical injury  Description: Free from accidental physical injury  6/30/2020 1119 by Hilary Blackmon RN  Outcome: Met This Shift  6/30/2020 0107 by Jes Tarango RN  Outcome: Met This Shift  Goal: Free from intentional harm  Description: Free from intentional harm  6/30/2020 1119 by Hilary Blackmon RN  Outcome: Met This Shift  6/30/2020 0107 by Jes Tarango RN  Outcome: Met This Shift     Problem: Falls - Risk of:  Goal: Will remain free from falls  Description: Will remain free from falls  6/30/2020 1119 by Hilary Blackmon RN  Outcome: Met This Shift  6/30/2020 0107 by Jes Tarango RN  Outcome: Met This Shift  Goal: Absence of physical injury  Description: Absence of physical injury  6/30/2020 1119 by Hilary Blackmon RN  Outcome: Met This Shift  6/30/2020 0107 by Jes Tarango RN  Outcome: Met This Shift

## 2020-06-30 NOTE — PROGRESS NOTES
Spoke with RN Tiesha Taylor to review note of Dr Astrid Beltran regarding CTA of liver or enhanced MRI of abdomen. RN states will call when either procedure is complete.

## 2020-06-30 NOTE — CONSULTS
Interventional Radiology    I have reviewed the referring physician's request for hepatic abscess drainage. I have reviewed the patient's imaging and EMR notes. Please premedicate the patient as needed for any potential intravenous contrast allergy and obtain a triphasic CTA of the liver or a contrast-enhanced MRI of the abdomen.     Electronically signed by Barbara Guthrie MD on 6/30/2020 at 8:15 AM

## 2020-06-30 NOTE — PROGRESS NOTES
[address] [place] [address] [facility]  Progress Note      Chief Complaint   Patient presents with    Abdominal Pain     Lower abdominal pain x six days, EMS states she was having an anxiety attack. Patient states she is not currently having pain or anxiety    Fever     103.0 during triage, patient states chills. SUBJECTIVE:      Patient is tolerating medications. No reported adverse drug reactions. No problems overnight. Less abdominal pain. Anxious about MRCP. Review of systems:    As stated above in the chief complaint, otherwise negative. Medications:    Scheduled Meds:   sterile water        acetaminophen  650 mg Rectal Once    levothyroxine  25 mcg Oral Daily    pantoprazole  40 mg Oral QAM AC    sodium chloride flush  10 mL Intravenous 2 times per day    [Held by provider] enoxaparin  40 mg Subcutaneous Daily    insulin lispro  0-6 Units Subcutaneous TID WC    ampicillin-sulbactam  3 g Intravenous Q6H     Continuous Infusions:   sodium chloride 125 mL/hr at 06/30/20 0600    dextrose       PRN Meds:sodium chloride flush, magnesium hydroxide, promethazine **OR** ondansetron, glucose, dextrose, glucagon (rDNA), dextrose, acetaminophen  Prior to Admission medications    Medication Sig Start Date End Date Taking? Authorizing Provider   glimepiride (AMARYL) 4 MG tablet Take 4 mg by mouth every morning (before breakfast)   Yes Historical Provider, MD   acetaminophen (TYLENOL) 325 MG tablet Take 650 mg by mouth every 6 hours as needed for Pain   Yes Historical Provider, MD   levothyroxine (SYNTHROID) 25 MCG tablet Take 1 tablet by mouth daily 6/26/20  Yes Lio Quezada, DO   Dulaglutide (TRULICITY) 4.74 ME/6.7FR SOPN Inject 0.75 mg into the skin once a week  Patient taking differently: Inject 0.75 mg into the skin once a week NEW MEDICINE AND IS PICKING UP TODAY.  Has not administered yet 6/26/20  Yes Elroy Seip,    atorvastatin (LIPITOR) 20 MG tablet Take 1 tablet by mouth daily  Patient taking differently: Take 20 mg by mouth nightly  20  Yes Carlyle Oliveira DO   fluticasone [facility]) 50 MCG/ACT nasal spray 2 sprays by Each Nostril route daily 20  Yes Lio Quezada DO   pantoprazole (PROTONIX) 40 MG tablet Take 1 tablet by mouth every morning (before breakfast) 20  Yes Lio Quezada DO   ibuprofen (ADVIL;MOTRIN) 600 MG tablet Take 1 tablet by mouth 3 times daily as needed for Pain 6/15/20  Yes Su Sanchez MD   glimepiride (AMARYL) 4 MG tablet Take 1 tablet by mouth every morning (before breakfast) 20   Lio Slaughter DO   Blood Glucose Monitoring Suppl (FREESTYLE FREEDOM LITE) w/Device KIT 1 kit by Does not apply route daily 3/4/20   Carlyle Oliveira DO   FreeStyle Lancets MISC 1 each by Does not apply route daily 3/4/20   Lio Quezada DO   blood glucose test strips (FREESTYLE LITE) strip 1 each by In Vitro route daily As needed. 3/4/20   Lio Slaughter DO       OBJECTIVE:  BP (!) 107/56   Pulse 112   Temp 103.1 °F (39.5 °C) (Oral)   Resp 20   Ht 5' 1\" (1.549 m)   Wt 136 lb 0.4 oz (61.7 kg)   LMP 2020   SpO2 96%   BMI 25.70 kg/m²   Temp  Av.6 °F (38.1 °C)  Min: 98.6 °F (37 °C)  Max: 103.2 °F (39.6 °C)  General appearance: Resting in bed in no apparent distress. Skin: Warm and dry. No rashes were noted. HEENT: Round and reactive pupils. Moist mucous membranes. No ulcerations or thrush. Neck: Supple to movements. Chest: No use of accessory muscles to breathe. Symmetrical expansion. No wheezing, crackles or rhonchi. Cardiovascular: Reguar rate and rhythm. No murmurs gallops, or rubs appreciated. Abdomen: Mild generalized tenderness. Normal bowel sounds, no masses or organomegaly. Extremities: No clubbing, no cyanosis, no edema. Lines: peripheral    I/O last 3 completed shifts: In: 3580 [P.O.:240;  I.V.:921]  Out: 1100 [Urine:1100]      Laboratory and Tests Review:      Lab Results   Component Value Date    WBC 12.6 (H) 06/30/2020    WBC 3.9 (L) 06/29/2020    WBC 9.5 06/25/2020    HGB 11.1 (L) 06/30/2020    HCT 32.9 (L) 06/30/2020    MCV 86.8 06/30/2020     06/30/2020     Lab Results   Component Value Date    NEUTROABS 11.09 (H) 06/30/2020    NEUTROABS 3.04 06/29/2020     No results found for: [facility]  Lab Results   Component Value Date    ALT 53 (H) 06/29/2020    AST 61 (H) 06/29/2020     (H) 06/29/2020    ALKPHOS 366 (H) 06/29/2020    BILITOT 1.0 06/29/2020     Lab Results   Component Value Date     06/30/2020    K 3.6 06/30/2020     06/30/2020    CO2 22 06/30/2020    BUN 9 06/30/2020    CREATININE 0.6 06/30/2020    CREATININE 0.7 06/29/2020    CREATININE 0.7 06/25/2020    GFRAA >60 06/30/2020    LABGLOM >60 06/30/2020    GLUCOSE 200 06/30/2020    PROT 8.4 06/29/2020    LABALBU 3.6 06/29/2020    CALCIUM 8.4 06/30/2020    BILITOT 1.0 06/29/2020    ALKPHOS 366 06/29/2020    AST 61 06/29/2020    ALT 53 06/29/2020     Lab Results   Component Value Date    CRP 17.7 (H) 06/29/2020     Lab Results   Component Value Date    SEDRATE 111 (H) 06/29/2020       Radiology:    XR CHEST PORTABLE   Final Result      Status post placement of left IJ central venous catheter with distal   tip at level of right atrial/caval junction or right atrium. US ABDOMEN LIMITED   Final Result   Ill-defined liver lesion which is indeterminate. CT-guided core biopsy   is recommended               XR CHEST PORTABLE   Final Result   Normal chest               CT ABDOMEN PELVIS WO CONTRAST Additional Contrast? None   Final Result   Complex lesion within the right lobe of the liver. This is   indeterminant. Recommend follow-up postcontrast. Malignancy cannot be   excluded.          MRI ABDOMEN W WO CONTRAST    (Results Pending)       Microbiology:   Lab Results   Component Value Date    [facility]  06/29/2020     Gram stain performed from blood culture bottle media  Gram negative rods       Lab Results   Component Value Date    BLOODCULT2  06/29/2020     Gram stain performed from blood culture bottle media  Gram negative rods       No results found for: WNDABS  No results found for: RESPSMEAR  No results found for: MPNEUMO, CLAMYDCU, LABLEGI, AFBCX, FUNGSM, LABFUNG  No results found for: CULTRESP  No results found for: CXCATHTIP  No results found for: BFCS  No results found for: CXSURG  Urine Culture, Routine   Date Value Ref Range Status   06/29/2020   Final    10 to 100,000 CFU/mL  Mixed sharmin isolated. Further workup and sensitivity testing  is not routinely indicated and will not be performed. Mixed sharmin isolated includes:  Mixed gram positive organisms       No results found for: [facility]    Problem list:    Principal Problem:    Sepsis ([facility]  Active Problems:    Type 2 diabetes mellitus without complication, without long-term current use of insulin (HCC)    Abdominal pain    Liver lesion    SIRS (systemic inflammatory response syndrome) ([facility]  Resolved Problems:    * No resolved hospital problems. *      ASSESSMENT:    · Pyogenic liver abscess  · Fever secondary to abscess  · Klebsiella bacteremia secondary liver abscess  · Leukopenia  · Elevation of transaminases associated to probable liver abscess    PLAN:    Continue Unasyn 3 g IV every 6 hours    Await MRCP    Await drainage of liver abscess    Await final sensitivity results      Yudi Baumann    2:43 PM  6/30/2020      Patient seen and examined. I had a face to face encounter with the patient. Agree with exam.  Assessment and plan as outlined above and directed by me. Addition and corrections were done as deemed appropriate. My exam and plan include: The patient has less abdominal discomfort. She is tolerating Unasyn without any problems. She does have positive blood cultures. The source is obviously the hepatic abscess. A triphasic CT was recommended and the patient has a history of allergy to iodine and declined it. An MRI will be done instead.   We will follow with you    Mireya Perez  6/30/2020  3:10 PM

## 2020-07-01 ENCOUNTER — APPOINTMENT (OUTPATIENT)
Dept: CT IMAGING | Age: 40
DRG: 871 | End: 2020-07-01
Payer: COMMERCIAL

## 2020-07-01 LAB
ALBUMIN SERPL-MCNC: 2.9 G/DL (ref 3.5–5.2)
ALP BLD-CCNC: 281 U/L (ref 35–104)
ALT SERPL-CCNC: 56 U/L (ref 0–32)
AMMONIA: 35.8 UMOL/L (ref 11–51)
ANION GAP SERPL CALCULATED.3IONS-SCNC: 10 MMOL/L (ref 7–16)
ANION GAP SERPL CALCULATED.3IONS-SCNC: 9 MMOL/L (ref 7–16)
ANTI-MITOCHONDRIAL AB, IFA: NEGATIVE
ANTI-NUCLEAR ANTIBODY (ANA): NEGATIVE
AST SERPL-CCNC: 50 U/L (ref 0–31)
BASOPHILS ABSOLUTE: 0.04 E9/L (ref 0–0.2)
BASOPHILS RELATIVE PERCENT: 0.5 % (ref 0–2)
BILIRUB SERPL-MCNC: 0.9 MG/DL (ref 0–1.2)
BILIRUBIN DIRECT: 0.7 MG/DL (ref 0–0.3)
BILIRUBIN, INDIRECT: 0.2 MG/DL (ref 0–1)
BUN BLDV-MCNC: 8 MG/DL (ref 6–20)
BUN BLDV-MCNC: 9 MG/DL (ref 6–20)
CALCIUM SERPL-MCNC: 8.2 MG/DL (ref 8.6–10.2)
CALCIUM SERPL-MCNC: 8.6 MG/DL (ref 8.6–10.2)
CHLORIDE BLD-SCNC: 101 MMOL/L (ref 98–107)
CHLORIDE BLD-SCNC: 105 MMOL/L (ref 98–107)
CO2: 20 MMOL/L (ref 22–29)
CO2: 23 MMOL/L (ref 22–29)
CREAT SERPL-MCNC: 0.5 MG/DL (ref 0.5–1)
CREAT SERPL-MCNC: 0.6 MG/DL (ref 0.5–1)
EOSINOPHILS ABSOLUTE: 0.03 E9/L (ref 0.05–0.5)
EOSINOPHILS RELATIVE PERCENT: 0.4 % (ref 0–6)
GFR AFRICAN AMERICAN: >60
GFR AFRICAN AMERICAN: >60
GFR NON-AFRICAN AMERICAN: >60 ML/MIN/1.73
GFR NON-AFRICAN AMERICAN: >60 ML/MIN/1.73
GLUCOSE BLD-MCNC: 174 MG/DL (ref 74–99)
GLUCOSE BLD-MCNC: 262 MG/DL (ref 74–99)
HCT VFR BLD CALC: 40 % (ref 34–48)
HEMOCCULT STL QL: NORMAL
HEMOGLOBIN: 13.2 G/DL (ref 11.5–15.5)
IGG: 1475 MG/DL (ref 700–1600)
IGM: 43 MG/DL (ref 40–230)
IMMATURE GRANULOCYTES #: 0.04 E9/L
IMMATURE GRANULOCYTES %: 0.5 % (ref 0–5)
INR BLD: 1.2
LYMPHOCYTES ABSOLUTE: 1.1 E9/L (ref 1.5–4)
LYMPHOCYTES RELATIVE PERCENT: 13.6 % (ref 20–42)
MAGNESIUM: 1.9 MG/DL (ref 1.6–2.6)
MCH RBC QN AUTO: 28.6 PG (ref 26–35)
MCHC RBC AUTO-ENTMCNC: 33 % (ref 32–34.5)
MCV RBC AUTO: 86.8 FL (ref 80–99.9)
METER GLUCOSE: 153 MG/DL (ref 74–99)
METER GLUCOSE: 194 MG/DL (ref 74–99)
METER GLUCOSE: 267 MG/DL (ref 74–99)
MONOCYTES ABSOLUTE: 0.62 E9/L (ref 0.1–0.95)
MONOCYTES RELATIVE PERCENT: 7.7 % (ref 2–12)
MRSA CULTURE ONLY: NORMAL
NEUTROPHILS ABSOLUTE: 6.23 E9/L (ref 1.8–7.3)
NEUTROPHILS RELATIVE PERCENT: 77.3 % (ref 43–80)
PDW BLD-RTO: 12.4 FL (ref 11.5–15)
PLATELET # BLD: 105 E9/L (ref 130–450)
PMV BLD AUTO: 12.1 FL (ref 7–12)
POTASSIUM REFLEX MAGNESIUM: 3 MMOL/L (ref 3.5–5)
POTASSIUM SERPL-SCNC: 4.1 MMOL/L (ref 3.5–5)
PROTHROMBIN TIME: 14.4 SEC (ref 9.3–12.4)
RBC # BLD: 4.61 E12/L (ref 3.5–5.5)
SMOOTH MUSCLE ANTIBODY: NEGATIVE
SODIUM BLD-SCNC: 131 MMOL/L (ref 132–146)
SODIUM BLD-SCNC: 137 MMOL/L (ref 132–146)
TOTAL PROTEIN: 6.6 G/DL (ref 6.4–8.3)
WBC # BLD: 8.1 E9/L (ref 4.5–11.5)

## 2020-07-01 PROCEDURE — 2500000003 HC RX 250 WO HCPCS: Performed by: RADIOLOGY

## 2020-07-01 PROCEDURE — 80076 HEPATIC FUNCTION PANEL: CPT

## 2020-07-01 PROCEDURE — 87040 BLOOD CULTURE FOR BACTERIA: CPT

## 2020-07-01 PROCEDURE — 87077 CULTURE AEROBIC IDENTIFY: CPT

## 2020-07-01 PROCEDURE — 6370000000 HC RX 637 (ALT 250 FOR IP): Performed by: INTERNAL MEDICINE

## 2020-07-01 PROCEDURE — 87205 SMEAR GRAM STAIN: CPT

## 2020-07-01 PROCEDURE — 83735 ASSAY OF MAGNESIUM: CPT

## 2020-07-01 PROCEDURE — 85610 PROTHROMBIN TIME: CPT

## 2020-07-01 PROCEDURE — 99233 SBSQ HOSP IP/OBS HIGH 50: CPT | Performed by: INTERNAL MEDICINE

## 2020-07-01 PROCEDURE — 6370000000 HC RX 637 (ALT 250 FOR IP): Performed by: RADIOLOGY

## 2020-07-01 PROCEDURE — 87075 CULTR BACTERIA EXCEPT BLOOD: CPT

## 2020-07-01 PROCEDURE — 80048 BASIC METABOLIC PNL TOTAL CA: CPT

## 2020-07-01 PROCEDURE — 36592 COLLECT BLOOD FROM PICC: CPT

## 2020-07-01 PROCEDURE — 2060000000 HC ICU INTERMEDIATE R&B

## 2020-07-01 PROCEDURE — 87070 CULTURE OTHR SPECIMN AEROBIC: CPT

## 2020-07-01 PROCEDURE — 85025 COMPLETE CBC W/AUTO DIFF WBC: CPT

## 2020-07-01 PROCEDURE — 6360000002 HC RX W HCPCS: Performed by: INTERNAL MEDICINE

## 2020-07-01 PROCEDURE — 2580000003 HC RX 258: Performed by: INTERNAL MEDICINE

## 2020-07-01 PROCEDURE — 82962 GLUCOSE BLOOD TEST: CPT

## 2020-07-01 PROCEDURE — 6360000002 HC RX W HCPCS: Performed by: RADIOLOGY

## 2020-07-01 PROCEDURE — 2709999900 CT ABSCESS DRAINAGE W CATH PLACEMENT S&I

## 2020-07-01 PROCEDURE — 82140 ASSAY OF AMMONIA: CPT

## 2020-07-01 PROCEDURE — 36415 COLL VENOUS BLD VENIPUNCTURE: CPT

## 2020-07-01 PROCEDURE — 87186 SC STD MICRODIL/AGAR DIL: CPT

## 2020-07-01 PROCEDURE — 0F913ZX DRAINAGE OF RIGHT LOBE LIVER, PERCUTANEOUS APPROACH, DIAGNOSTIC: ICD-10-PCS | Performed by: RADIOLOGY

## 2020-07-01 RX ORDER — FENTANYL CITRATE 50 UG/ML
50 INJECTION, SOLUTION INTRAMUSCULAR; INTRAVENOUS ONCE
Status: COMPLETED | OUTPATIENT
Start: 2020-07-01 | End: 2020-07-01

## 2020-07-01 RX ORDER — SODIUM CHLORIDE 0.9 % (FLUSH) 0.9 %
10 SYRINGE (ML) INJECTION PRN
Status: CANCELLED | OUTPATIENT
Start: 2020-07-01

## 2020-07-01 RX ORDER — MIDAZOLAM HYDROCHLORIDE 1 MG/ML
1 INJECTION INTRAMUSCULAR; INTRAVENOUS ONCE
Status: COMPLETED | OUTPATIENT
Start: 2020-07-01 | End: 2020-07-01

## 2020-07-01 RX ORDER — POTASSIUM CHLORIDE 20 MEQ/1
40 TABLET, EXTENDED RELEASE ORAL EVERY 4 HOURS
Status: COMPLETED | OUTPATIENT
Start: 2020-07-01 | End: 2020-07-01

## 2020-07-01 RX ORDER — OXYCODONE HYDROCHLORIDE 5 MG/1
5 TABLET ORAL ONCE
Status: COMPLETED | OUTPATIENT
Start: 2020-07-01 | End: 2020-07-01

## 2020-07-01 RX ORDER — LIDOCAINE HYDROCHLORIDE 20 MG/ML
10 INJECTION, SOLUTION INFILTRATION; PERINEURAL ONCE
Status: COMPLETED | OUTPATIENT
Start: 2020-07-01 | End: 2020-07-01

## 2020-07-01 RX ADMIN — LEVOTHYROXINE SODIUM 25 MCG: 25 TABLET ORAL at 06:28

## 2020-07-01 RX ADMIN — INSULIN LISPRO 3 UNITS: 100 INJECTION, SOLUTION INTRAVENOUS; SUBCUTANEOUS at 16:55

## 2020-07-01 RX ADMIN — ACETAMINOPHEN 650 MG: 325 TABLET ORAL at 23:13

## 2020-07-01 RX ADMIN — LIDOCAINE HYDROCHLORIDE 10 ML: 20 INJECTION, SOLUTION INFILTRATION; PERINEURAL at 09:29

## 2020-07-01 RX ADMIN — INSULIN LISPRO 1 UNITS: 100 INJECTION, SOLUTION INTRAVENOUS; SUBCUTANEOUS at 11:09

## 2020-07-01 RX ADMIN — ACETAMINOPHEN 650 MG: 325 TABLET ORAL at 00:31

## 2020-07-01 RX ADMIN — ONDANSETRON 4 MG: 2 INJECTION INTRAMUSCULAR; INTRAVENOUS at 10:23

## 2020-07-01 RX ADMIN — SODIUM CHLORIDE: 9 INJECTION, SOLUTION INTRAVENOUS at 08:24

## 2020-07-01 RX ADMIN — POTASSIUM CHLORIDE 40 MEQ: 20 TABLET, EXTENDED RELEASE ORAL at 15:06

## 2020-07-01 RX ADMIN — SODIUM CHLORIDE 3 G: 900 INJECTION INTRAVENOUS at 08:21

## 2020-07-01 RX ADMIN — SODIUM CHLORIDE 3 G: 900 INJECTION INTRAVENOUS at 20:05

## 2020-07-01 RX ADMIN — SODIUM CHLORIDE: 9 INJECTION, SOLUTION INTRAVENOUS at 18:57

## 2020-07-01 RX ADMIN — SODIUM CHLORIDE, PRESERVATIVE FREE 10 ML: 5 INJECTION INTRAVENOUS at 20:05

## 2020-07-01 RX ADMIN — SODIUM CHLORIDE 3 G: 900 INJECTION INTRAVENOUS at 15:06

## 2020-07-01 RX ADMIN — SODIUM CHLORIDE: 9 INJECTION, SOLUTION INTRAVENOUS at 00:23

## 2020-07-01 RX ADMIN — MIDAZOLAM HYDROCHLORIDE 1 MG: 1 INJECTION, SOLUTION INTRAMUSCULAR; INTRAVENOUS at 09:32

## 2020-07-01 RX ADMIN — SODIUM CHLORIDE 3 G: 900 INJECTION INTRAVENOUS at 00:22

## 2020-07-01 RX ADMIN — OXYCODONE HYDROCHLORIDE 5 MG: 5 TABLET ORAL at 10:21

## 2020-07-01 RX ADMIN — POTASSIUM CHLORIDE 40 MEQ: 20 TABLET, EXTENDED RELEASE ORAL at 10:58

## 2020-07-01 RX ADMIN — FENTANYL CITRATE 50 MCG: 50 INJECTION INTRAMUSCULAR; INTRAVENOUS at 09:32

## 2020-07-01 RX ADMIN — ACETAMINOPHEN 650 MG: 325 TABLET ORAL at 17:01

## 2020-07-01 ASSESSMENT — PAIN DESCRIPTION - ORIENTATION: ORIENTATION: RIGHT

## 2020-07-01 ASSESSMENT — PAIN SCALES - GENERAL
PAINLEVEL_OUTOF10: 0
PAINLEVEL_OUTOF10: 10
PAINLEVEL_OUTOF10: 0
PAINLEVEL_OUTOF10: 0
PAINLEVEL_OUTOF10: 3
PAINLEVEL_OUTOF10: 6
PAINLEVEL_OUTOF10: 0

## 2020-07-01 ASSESSMENT — PAIN DESCRIPTION - LOCATION: LOCATION: ABDOMEN;FLANK

## 2020-07-01 ASSESSMENT — PAIN DESCRIPTION - DESCRIPTORS: DESCRIPTORS: CONSTANT;DISCOMFORT

## 2020-07-01 ASSESSMENT — PAIN DESCRIPTION - PAIN TYPE: TYPE: SURGICAL PAIN;ACUTE PAIN

## 2020-07-01 NOTE — PRE SEDATION
Sedation Pre-Procedure Note    Patient Name: Bradley Stone   YOB: 1980  Room/Bed: 5037/6185-J  Medical Record Number: [id number]  Date: 7/1/2020   Time: 12:40 PM       Indication:  34 yo F with an intrahepatic fluid collection, likely representing and intrahepatic abscess. Here for CT guided drainage catheter placement. Consent: I have discussed with the patient and/or the patient representative the indication, alternatives, and the possible risks and/or complications of the planned procedure and the anesthesia methods. The patient and/or patient representative appear to understand and agree to proceed. Vital Signs:   Vitals:    07/01/20 1045   BP:    Pulse:    Resp:    Temp:    SpO2: 97%       Past Medical History:   has a past medical history of Carpal tunnel syndrome, right, Diabetes mellitus ([address] GERD (gastroesophageal reflux disease), Hyperlipidemia, and Hypothyroidism. Past Surgical History:   has a past surgical history that includes Carpal tunnel release (Right, 3/18/2020). Medications:   Scheduled Meds:    potassium chloride  40 mEq Oral Q4H    levothyroxine  25 mcg Oral Daily    pantoprazole  40 mg Oral QAM AC    sodium chloride flush  10 mL Intravenous 2 times per day    [Held by provider] enoxaparin  40 mg Subcutaneous Daily    insulin lispro  0-6 Units Subcutaneous TID WC    ampicillin-sulbactam  3 g Intravenous Q6H     Continuous Infusions:    sodium chloride 125 mL/hr at 07/01/20 0824    dextrose       PRN Meds: sodium chloride flush, magnesium hydroxide, promethazine **OR** ondansetron, glucose, dextrose, glucagon (rDNA), dextrose, acetaminophen  Home Meds:   Prior to Admission medications    Medication Sig Start Date End Date Taking?  Authorizing Provider   glimepiride (AMARYL) 4 MG tablet Take 4 mg by mouth every morning (before breakfast)   Yes Historical Provider, MD   acetaminophen (TYLENOL) 325 MG tablet Take 650 mg by mouth every 6 hours as needed for Pain   Yes Historical Provider, MD   levothyroxine (SYNTHROID) 25 MCG tablet Take 1 tablet by mouth daily 6/26/20  Yes Zahraa Jean DO   Dulaglutide (TRULICITY) 4.01 HX/1.7DM SOPN Inject 0.75 mg into the skin once a week  Patient taking differently: Inject 0.75 mg into the skin once a week NEW MEDICINE AND IS PICKING UP TODAY. Has not administered yet 6/26/20  Yes Zahraa Jean DO   atorvastatin (LIPITOR) 20 MG tablet Take 1 tablet by mouth daily  Patient taking differently: Take 20 mg by mouth nightly  6/18/20  Yes Lio Quezada DO   fluticasone (FLONASE) 50 MCG/ACT nasal spray 2 sprays by Each Nostril route daily 6/18/20  Yes Lio Quezada DO   pantoprazole (PROTONIX) 40 MG tablet Take 1 tablet by mouth every morning (before breakfast) 6/18/20  Yes Lio Quezada DO   ibuprofen (ADVIL;MOTRIN) 600 MG tablet Take 1 tablet by mouth 3 times daily as needed for Pain 6/15/20  Yes Eboni Wilson MD   glimepiride (AMARYL) 4 MG tablet Take 1 tablet by mouth every morning (before breakfast) 6/26/20   Lio Winters DO   Blood Glucose Monitoring Suppl (FREESTYLE FREEDOM LITE) w/Device KIT 1 kit by Does not apply route daily 3/4/20   Zahraa Jean DO   FreeStyle Lancets MISC 1 each by Does not apply route daily 3/4/20   Lio Quezada DO   blood glucose test strips (FREESTYLE LITE) strip 1 each by In Vitro route daily As needed. 3/4/20   Lio Winters DO     Coumadin Use Last 7 Days:  no  Antiplatelet drug therapy use last 7 days: no  Other anticoagulant use last 7 days: no  Additional Medication Information:  n/a      Pre-Sedation Documentation and Exam:   I have personally completed a history, physical exam & review of systems for this patient (see notes).     Mallampati Airway Assessment:  Mallampati Class II - (soft palate, fauces & uvula are visible)    Prior History of Anesthesia Complications:   none    ASA Classification:  Class 2 - A normal healthy patient with mild systemic

## 2020-07-01 NOTE — PROGRESS NOTES
[address] [place] [address] [facility]  Progress Note      Chief Complaint   Patient presents with    Abdominal Pain     Lower abdominal pain x six days, EMS states she was having an anxiety attack. Patient states she is not currently having pain or anxiety    Fever     103.0 during triage, patient states chills. SUBJECTIVE:      Patient is tolerating medications. No reported adverse drug reactions. No problems overnight. Complains of shortness of breath and severe right upper quadrant pain post procedure      Review of systems:    As stated above in the chief complaint, otherwise negative. Medications:    Scheduled Meds:   potassium chloride  40 mEq Oral Q4H    levothyroxine  25 mcg Oral Daily    pantoprazole  40 mg Oral QAM AC    sodium chloride flush  10 mL Intravenous 2 times per day    [Held by provider] enoxaparin  40 mg Subcutaneous Daily    insulin lispro  0-6 Units Subcutaneous TID WC    ampicillin-sulbactam  3 g Intravenous Q6H     Continuous Infusions:   sodium chloride 125 mL/hr at 07/01/20 0824    dextrose       PRN Meds:sodium chloride flush, magnesium hydroxide, promethazine **OR** ondansetron, glucose, dextrose, glucagon (rDNA), dextrose, acetaminophen  Prior to Admission medications    Medication Sig Start Date End Date Taking? Authorizing Provider   glimepiride (AMARYL) 4 MG tablet Take 4 mg by mouth every morning (before breakfast)   Yes Historical Provider, MD   acetaminophen (TYLENOL) 325 MG tablet Take 650 mg by mouth every 6 hours as needed for Pain   Yes Historical Provider, MD   levothyroxine (SYNTHROID) 25 MCG tablet Take 1 tablet by mouth daily 6/26/20  Yes Lio Quezada, DO   Dulaglutide (TRULICITY) 5.09 BP/0.0PH SOPN Inject 0.75 mg into the skin once a week  Patient taking differently: Inject 0.75 mg into the skin once a week NEW MEDICINE AND IS PICKING UP TODAY.  Has not administered yet 6/26/20  Yes Michele Pina, DO   atorvastatin (LIPITOR) 20 MG tablet Take 1 tablet by mouth daily  Patient taking differently: Take 20 mg by mouth nightly  20  Yes Cherri May DO   fluticasone (FLONASE) 50 MCG/ACT nasal spray 2 sprays by Each Nostril route daily 20  Yes Lio Quezada DO   pantoprazole (PROTONIX) 40 MG tablet Take 1 tablet by mouth every morning (before breakfast) 20  Yes Lio Quezada DO   ibuprofen (ADVIL;MOTRIN) 600 MG tablet Take 1 tablet by mouth 3 times daily as needed for Pain 6/15/20  Yes Ammy French MD   glimepiride (AMARYL) 4 MG tablet Take 1 tablet by mouth every morning (before breakfast) 20   Lio Yancey DO   Blood Glucose Monitoring Suppl (FREESTYLE FREEDOM LITE) w/Device KIT 1 kit by Does not apply route daily 3/4/20   Cherri May DO   FreeStyle Lancets MISC 1 each by Does not apply route daily 3/4/20   Lio Quezada DO   blood glucose test strips (FREESTYLE LITE) strip 1 each by In Vitro route daily As needed. 3/4/20   Lio Yancey DO       OBJECTIVE:  BP (!) 153/84   Pulse 150   Temp 99.1 °F (37.3 °C) (Oral)   Resp 20   Ht 5' 1\" (1.549 m)   Wt 141 lb 14.4 oz (64.4 kg)   LMP 2020   SpO2 97%   BMI 26.81 kg/m²   Temp  Av.8 °F (37.7 °C)  Min: 98.2 °F (36.8 °C)  Max: 103.1 °F (39.5 °C)  General appearance: Resting in bed in no apparent distress. Skin: Warm and dry. No rashes were noted. HEENT: Round and reactive pupils. Moist mucous membranes. No ulcerations or thrush. Neck: Supple to movements. Chest: No use of accessory muscles to breathe. Diminished right lower lobe. No wheezing, crackles or rhonchi. Cardiovascular: Reguar rate and rhythm. No murmurs gallops, or rubs appreciated. Abdomen: Right upper quadrant drain in place. Bowel sounds present, nontender, nondistended, no masses or hepatosplenomegaly. Extremities: No clubbing, no cyanosis, no edema. Lines: peripheral    I/O last 3 completed shifts:   In: 1250 [I.V.:1250]  Out: 750 [Urine:750]      Laboratory and Tests Review:      Lab Results   Component Value Date    WBC 8.1 07/01/2020    WBC 12.6 (H) 06/30/2020    WBC 3.9 (L) 06/29/2020    HGB 13.2 07/01/2020    HCT 40.0 07/01/2020    MCV 86.8 07/01/2020     (L) 07/01/2020     Lab Results   Component Value Date    NEUTROABS 6.23 07/01/2020    NEUTROABS 11.09 (H) 06/30/2020    NEUTROABS 3.04 06/29/2020     No results found for: [facility]  Lab Results   Component Value Date    ALT 56 (H) 07/01/2020    AST 50 (H) 07/01/2020     (H) 06/29/2020    ALKPHOS 281 (H) 07/01/2020    BILITOT 0.9 07/01/2020     Lab Results   Component Value Date     07/01/2020    K 3.0 07/01/2020     07/01/2020    CO2 23 07/01/2020    BUN 9 07/01/2020    CREATININE 0.5 07/01/2020    CREATININE 0.6 06/30/2020    CREATININE 0.7 06/29/2020    GFRAA >60 07/01/2020    LABGLOM >60 07/01/2020    GLUCOSE 174 07/01/2020    PROT 6.6 07/01/2020    LABALBU 2.9 07/01/2020    CALCIUM 8.2 07/01/2020    BILITOT 0.9 07/01/2020    ALKPHOS 281 07/01/2020    AST 50 07/01/2020    ALT 56 07/01/2020     Lab Results   Component Value Date    CRP 17.7 (H) 06/29/2020     Lab Results   Component Value Date    SEDRATE 111 (H) 06/29/2020       Radiology:    MRI ABDOMEN W WO CONTRAST   Final Result      1. Lobular, multiloculated lesion is located within posterior segment   right hepatic lobe. This lesion shows no enhancement, however there is   increased signal on diffusion-weighted imaging suggestive of high   cellularity which would be associated with hepatic abscess. Carcinoma   is a lesser possibility. Clinical correlation recommended. Short-term   follow-up may be helpful for further evaluation. 2. No additional liver lesion evident. 3. No intrahepatic or extrahepatic bile duct dilatation. XR CHEST PORTABLE   Final Result      Status post placement of left IJ central venous catheter with distal   tip at level of right atrial/caval junction or right atrium. US ABDOMEN LIMITED   Final Result   Ill-defined liver lesion which is indeterminate. CT-guided core biopsy   is recommended               XR CHEST PORTABLE   Final Result   Normal chest               CT ABDOMEN PELVIS WO CONTRAST Additional Contrast? None   Final Result   Complex lesion within the right lobe of the liver. This is   indeterminant. Recommend follow-up postcontrast. Malignancy cannot be   excluded. CT ABSCESS DRAINAGE W CATH PLACEMENT S&I    (Results Pending)       Microbiology:   Lab Results   Component Value Date    [facility]  06/29/2020     Gram stain performed from blood culture bottle media  Gram negative rods      ORG Gram negative amy 06/29/2020     Lab Results   Component Value Date    BLOODCULT2  06/29/2020     Gram stain performed from blood culture bottle media  Gram negative rods      BLOODCULT2 Identification and sensitivity to follow 06/29/2020    ORG Gram negative amy 06/29/2020     No results found for: WNDABS  No results found for: RESPSMEAR  No results found for: MPNEUMO, CLAMYDCU, LABLEGI, AFBCX, FUNGSM, LABFUNG  No results found for: CULTRESP  No results found for: CXCATHTIP  No results found for: BFCS  No results found for: CXSURG  Urine Culture, Routine   Date Value Ref Range Status   06/29/2020   Final    10 to 100,000 CFU/mL  Mixed sharmin isolated. Further workup and sensitivity testing  is not routinely indicated and will not be performed. Mixed sharmin isolated includes:  Mixed gram positive organisms       MRSA Culture Only   Date Value Ref Range Status   06/29/2020 Methicillin resistant Staph aureus not isolated  Final       Problem list:    Principal Problem:    Sepsis ([address]  Active Problems:    Type 2 diabetes mellitus without complication, without long-term current use of insulin (HCC)    Abdominal pain    Liver lesion    SIRS (systemic inflammatory response syndrome) ([address]  Resolved Problems:    * No resolved hospital problems.  *      ASSESSMENT:    · Pyogenic liver abscess  · Fever secondary to abscess  · Klebsiella bacteremia secondary liver abscess  · Leukopenia  · Elevation of transaminases associated to probable liver abscess       PLAN:    Continue Unasyn    Continue drainage    Await final culture and sensitivity results from blood and cultures from abscess fluid      [address]    1:09 PM  7/1/2020      Patient seen and examined. I had a face to face encounter with the patient. Agree with exam.  Assessment and plan as outlined above and directed by me. Addition and corrections were done as deemed appropriate. My exam and plan include: The patient is quite uncomfortable because of the pigtail catheter causing significant amount of sharp pain in the right upper quadrant when she breathes. She is taking shallow breaths to avoid the pain. Continue Unasyn while waiting for the results of the cultures.     Mary Grant  7/1/2020  2:54 PM

## 2020-07-01 NOTE — HOME CARE
[facility] received referral. Will follow after discharge. Spoke with patient hsb and verified demographics. Hsb agreeable to IV pricing (covered at 100%). Daryle Boston LPN, [facility].

## 2020-07-01 NOTE — PLAN OF CARE
Problem: Pain:  Goal: Pain level will decrease  Description: Pain level will decrease     Outcome: Met This Shift     Problem: FALL RISK  Goal: Absence of falls  Outcome: Met This Shift     Problem: Safety:  Goal: Free from accidental physical injury  Description: Free from accidental physical injury  Outcome: Met This Shift  Goal: Free from intentional harm  Description: Free from intentional harm  Outcome: Met This Shift     Problem: Falls - Risk of:  Goal: Will remain free from falls  Description: Will remain free from falls  Outcome: Met This Shift  Goal: Absence of physical injury  Description: Absence of physical injury  Outcome: Met This Shift     Problem: Body Temperature - Imbalanced:  Goal: Body temperature is within normal range  Outcome: Not Met This Shift     Problem: INFECTION  Goal: Patient exhibits no signs of infection.   Outcome: Not Met This Shift

## 2020-07-01 NOTE — BRIEF OP NOTE
Brief Postoperative Note    Sallie Calderon  YOB: 1980  [id number]    Pre-operative Diagnosis and Procedure: 36 yo F with an intrahepatic fluid collection, likely representing and intrahepatic abscess. Here for CT guided drainage catheter placement. Post-operative Diagnosis: Same    Anesthesia: Local    Estimated Blood Loss: < 10 cc    Surgeon: Akua Goetz MD    Complications: none    Specimen obtained: purulent fluid sent for cultures. Findings: Successful CT guided drainage catheter placement for an intrahepatic abscess.      Akua Goetz MD   7/1/2020 12:42 PM

## 2020-07-01 NOTE — INTERVAL H&P NOTE
Update History & Physical    The patient's History and Physical of June 29, 2020 was reviewed with the patient and I examined the patient. There was no change. The surgical site was confirmed by the patient and me. Plan: The risks, benefits, expected outcome, and alternative to the recommended procedure have been discussed with the patient. Patient understands and wants to proceed with the procedure.      Electronically signed by Carol Farley MD on 7/1/2020 at 12:40 PM

## 2020-07-01 NOTE — PROGRESS NOTES
PROGRESS NOTE    Patient Presents with/Seen in Consultation For      *Reason for Consult: complex liver lesion     CHIEF COMPLAINT:  Chills, and lower abdominal pain    Subjective:     Patient seen laying in bed, recently returned from IR. Patient groggy, sleepy. Awakens easily to name, and drifts back to sleep quickly. Has been NPO. Slightly nauseated, denies any vomiting. Patient incontinent of bowel & bladder, BS nurse made aware. POC reviewed with the patient, states understanding. Review of Systems  Aside from what was mentioned in the PMH and HPI, essentially unremarkable, all others negative. Objective:     Patient Vitals for the past 8 hrs:   BP Temp Temp src Pulse Resp SpO2 Weight   07/01/20 1045 -- -- -- -- -- 97 % --   07/01/20 1030 (!) 153/84 99.1 °F (37.3 °C) Oral 150 20 94 % --   07/01/20 1000 121/67 -- -- 72 19 96 % --   07/01/20 0955 139/82 -- -- 83 18 98 % --   07/01/20 0950 134/78 -- -- 77 17 99 % --   07/01/20 0945 125/77 -- -- 85 22 99 % --   07/01/20 0940 135/84 -- -- 82 22 100 % --   07/01/20 0935 (!) 151/90 -- -- 76 17 100 % --   07/01/20 0930 (!) 148/87 -- -- 78 22 100 % --   07/01/20 0925 (!) 146/77 -- -- 77 20 100 % --   07/01/20 0920 (!) 140/66 -- -- 78 22 100 % --   07/01/20 0815 137/85 98.2 °F (36.8 °C) Oral 80 18 98 % --   07/01/20 0357 -- -- -- -- -- -- 141 lb 14.4 oz (64.4 kg)       General appearance: alert, asleep, awakens easily,  laying in bed, and cooperative  Eyes: conjunctivae/corneas clear. PERRL.   Lungs: clear to auscultation bilaterally  Heart: regular rate and rhythm, no murmur, 2+ pulses;  without edema  Abdomen: softly distended, appropriate tenderness to palpitation, drain- RUQ, GILMER intact bloody drainage seen,  bowel sounds normal  Extremities: extremities without edema  Pulses: 2+ and symmetric  Skin: Skin color, texture, turgor normal.   Neurologic: Grossly normal    potassium chloride (KLOR-CON M) extended release tablet 40 mEq, Q4H  0.9 % sodium chloride infusion, Continuous  levothyroxine (SYNTHROID) tablet 25 mcg, Daily  pantoprazole (PROTONIX) tablet 40 mg, QAM AC  sodium chloride flush 0.9 % injection 10 mL, 2 times per day  sodium chloride flush 0.9 % injection 10 mL, PRN  magnesium hydroxide (MILK OF MAGNESIA) 400 MG/5ML suspension 30 mL, Daily PRN  promethazine (PHENERGAN) tablet 12.5 mg, Q6H PRN    Or  ondansetron (ZOFRAN) injection 4 mg, Q6H PRN  [Held by provider] enoxaparin (LOVENOX) injection 40 mg, Daily  insulin lispro (HUMALOG) injection vial 0-6 Units, TID WC  glucose (GLUTOSE) 40 % oral gel 15 g, PRN  dextrose 50 % IV solution, PRN  glucagon (rDNA) injection 1 mg, PRN  dextrose 5 % solution, PRN  ampicillin-sulbactam (UNASYN) 3 g ivpb minibag, Q6H  acetaminophen (TYLENOL) tablet 650 mg, Q6H PRN         Data Review  CBC:   Lab Results   Component Value Date    WBC 8.1 07/01/2020    RBC 4.61 07/01/2020    HGB 13.2 07/01/2020    HCT 40.0 07/01/2020    MCV 86.8 07/01/2020    MCH 28.6 07/01/2020    MCHC 33.0 07/01/2020    RDW 12.4 07/01/2020     07/01/2020    MPV 12.1 07/01/2020     CMP:    Lab Results   Component Value Date     07/01/2020    K 3.0 07/01/2020     07/01/2020    CO2 23 07/01/2020    BUN 9 07/01/2020    CREATININE 0.5 07/01/2020    GFRAA >60 07/01/2020    LABGLOM >60 07/01/2020    GLUCOSE 174 07/01/2020    PROT 6.6 07/01/2020    LABALBU 2.9 07/01/2020    CALCIUM 8.2 07/01/2020    BILITOT 0.9 07/01/2020    ALKPHOS 281 07/01/2020    AST 50 07/01/2020    ALT 56 07/01/2020     Hepatic Function Panel:    Lab Results   Component Value Date    ALKPHOS 281 07/01/2020    ALT 56 07/01/2020    AST 50 07/01/2020    PROT 6.6 07/01/2020    BILITOT 0.9 07/01/2020    BILIDIR 0.7 07/01/2020    IBILI 0.2 07/01/2020    LABALBU 2.9 07/01/2020     No components found for: CHLPL  Lab Results   Component Value Date    TRIG 162 (H) 06/25/2020    TRIG 138 02/22/2020     Lab Results   Component Value Date    HDL 39 06/25/2020    HDL 61 02/22/2020 Lab Results   Component Value Date    LDLCALC 130 (H) 2020    LDLCALC 243 (H) 2020     Lab Results   Component Value Date    LABVLDL 32 2020    LABVLDL 28 2020      PT/INR:    Lab Results   Component Value Date    PROTIME 14.4 2020    INR 1.2 2020     IRON:    Lab Results   Component Value Date    IRON 14 2020     Iron Saturation:  No components found for: PERCENTFE  FERRITIN:    Lab Results   Component Value Date    FERRITIN 1,852 2020     Mri Abdomen W Wo Contrast    Result Date: 2020  Patient MRN:  [id number] : 1980 Age: 35 years Gender: Female Order Date:  2020 2:15 PM EXAM: MRI ABDOMEN W WO CONTRAST COMPARISON: Correlation made with CT of the abdomen and pelvis from 2020 INDICATION:  Evaluate liver lesion (patient had anaphylactic shock with iodine in past so trying to avoid triphasic CT) Evaluate liver lesion (patient had anaphylactic shock with iodine in past so trying to avoid triphasic CT) FINDINGS: 6 mm if this utilized. There is a lobular, multiloculated lesion located within the posterior segment of the right hepatic lobe which measures approximately  5 x 2.8 cm. Multiple subcentimeter lesions are seen adjacent to the margins of this lesion. This lesion shows no significant enhancement. This lesion is intermediate in intensity on T2 weighted imaging. Posterior margin of the lesion is partially obscured. This lesion shows increased signal on diffusion-weighted imaging. Apparent diffusion coefficient mapping is not available. No additional lesion evident. There is no intrahepatic or extra hepatic bile duct dilatation. Common bile duct is nondilated. The gallbladder is grossly unremarkable. No evidence of pancreatic mass. No acute pancreatitis. Spleen is nonenlarged. 1. Lobular, multiloculated lesion is located within posterior segment right hepatic lobe.  This lesion shows no enhancement, however there is increased signal on

## 2020-07-01 NOTE — HOME CARE
[facility] IV PRICIN [facility] has 100% coverage  Jazmin Carolina LPN, [facility].

## 2020-07-02 LAB
AFP-TUMOR MARKER: 1 NG/ML (ref 0–9)
ALBUMIN SERPL-MCNC: 2.7 G/DL (ref 3.5–5.2)
ALK PHOS OTHER CALC: 0 U/L
ALK PHOSPHATASE: 311 U/L (ref 40–120)
ALKALINE PHOSPHATASE BONE FRACTION: 37 U/L (ref 0–55)
ALKALINE PHOSPHATASE LIVER FRACTION: 274 U/L (ref 0–94)
ALP BLD-CCNC: 317 U/L (ref 35–104)
ALPHA-1 ANTITRYPSIN: 215 MG/DL (ref 90–200)
ALT SERPL-CCNC: 52 U/L (ref 0–32)
AMMONIA: 33.5 UMOL/L (ref 11–51)
ANION GAP SERPL CALCULATED.3IONS-SCNC: 11 MMOL/L (ref 7–16)
AST SERPL-CCNC: 48 U/L (ref 0–31)
BASOPHILS ABSOLUTE: 0.02 E9/L (ref 0–0.2)
BASOPHILS RELATIVE PERCENT: 0.3 % (ref 0–2)
BILIRUB SERPL-MCNC: 1 MG/DL (ref 0–1.2)
BILIRUBIN DIRECT: 0.7 MG/DL (ref 0–0.3)
BILIRUBIN, INDIRECT: 0.3 MG/DL (ref 0–1)
BLOOD CULTURE, ROUTINE: ABNORMAL
BUN BLDV-MCNC: 7 MG/DL (ref 6–20)
CALCIUM SERPL-MCNC: 8.4 MG/DL (ref 8.6–10.2)
CHLORIDE BLD-SCNC: 104 MMOL/L (ref 98–107)
CO2: 18 MMOL/L (ref 22–29)
CREAT SERPL-MCNC: 0.5 MG/DL (ref 0.5–1)
EOSINOPHILS ABSOLUTE: 0 E9/L (ref 0.05–0.5)
EOSINOPHILS RELATIVE PERCENT: 0 % (ref 0–6)
GFR AFRICAN AMERICAN: >60
GFR NON-AFRICAN AMERICAN: >60 ML/MIN/1.73
GLUCOSE BLD-MCNC: 220 MG/DL (ref 74–99)
GRAM STAIN ORDERABLE: NORMAL
HCT VFR BLD CALC: 41.1 % (ref 34–48)
HEMOGLOBIN: 13.4 G/DL (ref 11.5–15.5)
IMMATURE GRANULOCYTES #: 0.02 E9/L
IMMATURE GRANULOCYTES %: 0.3 % (ref 0–5)
INR BLD: 1.3
LYMPHOCYTES ABSOLUTE: 0.73 E9/L (ref 1.5–4)
LYMPHOCYTES RELATIVE PERCENT: 11.9 % (ref 20–42)
MCH RBC QN AUTO: 28.3 PG (ref 26–35)
MCHC RBC AUTO-ENTMCNC: 32.6 % (ref 32–34.5)
MCV RBC AUTO: 86.9 FL (ref 80–99.9)
METER GLUCOSE: 220 MG/DL (ref 74–99)
METER GLUCOSE: 224 MG/DL (ref 74–99)
METER GLUCOSE: 224 MG/DL (ref 74–99)
MONOCYTES ABSOLUTE: 0.41 E9/L (ref 0.1–0.95)
MONOCYTES RELATIVE PERCENT: 6.7 % (ref 2–12)
NEUTROPHILS ABSOLUTE: 4.96 E9/L (ref 1.8–7.3)
NEUTROPHILS RELATIVE PERCENT: 80.8 % (ref 43–80)
ORGANISM: ABNORMAL
ORGANISM: ABNORMAL
PDW BLD-RTO: 12.9 FL (ref 11.5–15)
PLATELET # BLD: 103 E9/L (ref 130–450)
PMV BLD AUTO: 11.6 FL (ref 7–12)
POTASSIUM REFLEX MAGNESIUM: 3.6 MMOL/L (ref 3.5–5)
PROTHROMBIN TIME: 15.2 SEC (ref 9.3–12.4)
RBC # BLD: 4.73 E12/L (ref 3.5–5.5)
RBC # BLD: NORMAL 10*6/UL
SODIUM BLD-SCNC: 133 MMOL/L (ref 132–146)
TOTAL PROTEIN: 6.7 G/DL (ref 6.4–8.3)
WBC # BLD: 6.1 E9/L (ref 4.5–11.5)

## 2020-07-02 PROCEDURE — 6360000002 HC RX W HCPCS: Performed by: INTERNAL MEDICINE

## 2020-07-02 PROCEDURE — 2700000000 HC OXYGEN THERAPY PER DAY

## 2020-07-02 PROCEDURE — 6370000000 HC RX 637 (ALT 250 FOR IP): Performed by: INTERNAL MEDICINE

## 2020-07-02 PROCEDURE — 2060000000 HC ICU INTERMEDIATE R&B

## 2020-07-02 PROCEDURE — 99233 SBSQ HOSP IP/OBS HIGH 50: CPT | Performed by: INTERNAL MEDICINE

## 2020-07-02 PROCEDURE — 36415 COLL VENOUS BLD VENIPUNCTURE: CPT

## 2020-07-02 PROCEDURE — 80048 BASIC METABOLIC PNL TOTAL CA: CPT

## 2020-07-02 PROCEDURE — 85025 COMPLETE CBC W/AUTO DIFF WBC: CPT

## 2020-07-02 PROCEDURE — 80076 HEPATIC FUNCTION PANEL: CPT

## 2020-07-02 PROCEDURE — 85610 PROTHROMBIN TIME: CPT

## 2020-07-02 PROCEDURE — 82962 GLUCOSE BLOOD TEST: CPT

## 2020-07-02 PROCEDURE — 2580000003 HC RX 258: Performed by: INTERNAL MEDICINE

## 2020-07-02 PROCEDURE — 82140 ASSAY OF AMMONIA: CPT

## 2020-07-02 RX ORDER — POTASSIUM CHLORIDE 20 MEQ/1
40 TABLET, EXTENDED RELEASE ORAL ONCE
Status: COMPLETED | OUTPATIENT
Start: 2020-07-02 | End: 2020-07-02

## 2020-07-02 RX ORDER — METRONIDAZOLE 500 MG/1
500 TABLET ORAL EVERY 8 HOURS SCHEDULED
Status: DISCONTINUED | OUTPATIENT
Start: 2020-07-02 | End: 2020-07-05 | Stop reason: HOSPADM

## 2020-07-02 RX ADMIN — WATER 2 G: 1 INJECTION INTRAMUSCULAR; INTRAVENOUS; SUBCUTANEOUS at 14:26

## 2020-07-02 RX ADMIN — SODIUM CHLORIDE: 9 INJECTION, SOLUTION INTRAVENOUS at 03:36

## 2020-07-02 RX ADMIN — SODIUM CHLORIDE 3 G: 900 INJECTION INTRAVENOUS at 03:36

## 2020-07-02 RX ADMIN — ONDANSETRON 4 MG: 2 INJECTION INTRAMUSCULAR; INTRAVENOUS at 03:49

## 2020-07-02 RX ADMIN — SODIUM CHLORIDE 3 G: 900 INJECTION INTRAVENOUS at 07:49

## 2020-07-02 RX ADMIN — METRONIDAZOLE 500 MG: 500 TABLET, FILM COATED ORAL at 21:07

## 2020-07-02 RX ADMIN — SODIUM CHLORIDE: 9 INJECTION, SOLUTION INTRAVENOUS at 21:08

## 2020-07-02 RX ADMIN — ACETAMINOPHEN 650 MG: 325 TABLET ORAL at 07:48

## 2020-07-02 RX ADMIN — ACETAMINOPHEN 650 MG: 325 TABLET ORAL at 14:08

## 2020-07-02 RX ADMIN — POTASSIUM CHLORIDE 40 MEQ: 20 TABLET, EXTENDED RELEASE ORAL at 11:45

## 2020-07-02 RX ADMIN — ACETAMINOPHEN 650 MG: 325 TABLET ORAL at 21:07

## 2020-07-02 RX ADMIN — INSULIN LISPRO 2 UNITS: 100 INJECTION, SOLUTION INTRAVENOUS; SUBCUTANEOUS at 05:59

## 2020-07-02 RX ADMIN — INSULIN LISPRO 2 UNITS: 100 INJECTION, SOLUTION INTRAVENOUS; SUBCUTANEOUS at 11:45

## 2020-07-02 RX ADMIN — INSULIN LISPRO 2 UNITS: 100 INJECTION, SOLUTION INTRAVENOUS; SUBCUTANEOUS at 16:29

## 2020-07-02 RX ADMIN — LEVOTHYROXINE SODIUM 25 MCG: 25 TABLET ORAL at 05:55

## 2020-07-02 RX ADMIN — METRONIDAZOLE 500 MG: 500 TABLET, FILM COATED ORAL at 14:26

## 2020-07-02 RX ADMIN — PANTOPRAZOLE SODIUM 40 MG: 40 TABLET, DELAYED RELEASE ORAL at 05:55

## 2020-07-02 RX ADMIN — SODIUM CHLORIDE, PRESERVATIVE FREE 10 ML: 5 INJECTION INTRAVENOUS at 07:49

## 2020-07-02 ASSESSMENT — PAIN DESCRIPTION - LOCATION
LOCATION: FLANK;ABDOMEN
LOCATION: FLANK;ABDOMEN

## 2020-07-02 ASSESSMENT — PAIN DESCRIPTION - DESCRIPTORS
DESCRIPTORS: SHOOTING
DESCRIPTORS: SHOOTING

## 2020-07-02 ASSESSMENT — PAIN SCALES - GENERAL
PAINLEVEL_OUTOF10: 4
PAINLEVEL_OUTOF10: 0
PAINLEVEL_OUTOF10: 6
PAINLEVEL_OUTOF10: 6
PAINLEVEL_OUTOF10: 0
PAINLEVEL_OUTOF10: 0

## 2020-07-02 ASSESSMENT — PAIN DESCRIPTION - PAIN TYPE
TYPE: ACUTE PAIN;SURGICAL PAIN
TYPE: SURGICAL PAIN;ACUTE PAIN

## 2020-07-02 ASSESSMENT — PAIN DESCRIPTION - ORIENTATION: ORIENTATION: RIGHT

## 2020-07-02 NOTE — PLAN OF CARE
Problem: Safety:  Goal: Free from accidental physical injury  Description: Free from accidental physical injury  Outcome: Met This Shift  Goal: Free from intentional harm  Description: Free from intentional harm  Outcome: Met This Shift     Problem: Falls - Risk of:  Goal: Will remain free from falls  Description: Will remain free from falls  Outcome: Met This Shift  Goal: Absence of physical injury  Description: Absence of physical injury  Outcome: Met This Shift     Problem: Body Temperature - Imbalanced:  Goal: Body temperature is within normal range  Outcome: Not Met This Shift     Problem: INFECTION  Goal: Patient exhibits no signs of infection.   Outcome: Not Met This Shift     Problem: Pain:  Goal: Pain level will decrease  Description: Pain level will decrease     Outcome: Not Met This Shift     Problem: FALL RISK  Goal: Absence of falls  Outcome: Met This Shift

## 2020-07-02 NOTE — PROGRESS NOTES
[facility] Progress Note    Admitting Date and Time: 6/29/2020  8:57 AM  Admit Dx: Sepsis ([facility] [A41.9]  Sepsis ([facility] [A41.9]    Subjective:  Patient is being followed for Sepsis ([facility] [A41.9]  Sepsis ([facility] [A41.9]     The patient states that she is feeling better. She still admits to right flank pain where the drain is located. She is tolerating a diet.      metroNIDAZOLE  500 mg Oral 3 times per day    cefTRIAXone (ROCEPHIN) IV  2 g Intravenous Q24H    levothyroxine  25 mcg Oral Daily    pantoprazole  40 mg Oral QAM AC    sodium chloride flush  10 mL Intravenous 2 times per day    [Held by provider] enoxaparin  40 mg Subcutaneous Daily    insulin lispro  0-6 Units Subcutaneous TID WC     sodium chloride flush, 10 mL, PRN  magnesium hydroxide, 30 mL, Daily PRN  promethazine, 12.5 mg, Q6H PRN    Or  ondansetron, 4 mg, Q6H PRN  glucose, 15 g, PRN  dextrose, 12.5 g, PRN  glucagon (rDNA), 1 mg, PRN  dextrose, 100 mL/hr, PRN  acetaminophen, 650 mg, Q6H PRN         Objective:    /78   Pulse 102   Temp 100.3 °F (37.9 °C) (Oral)   Resp 16   Ht 5' 1\" (1.549 m)   Wt 151 lb 4.8 oz (68.6 kg)   LMP 06/08/2020   SpO2 98%   BMI 28.59 kg/m²     Gen: NAD, AAOx3, sitting in a chair  HEENT: NCAT  CV: Tachycardia, no m/r/g  Resp: Equal chest rise b/l, CTAB  Abd: Soft, mild R flank tenderness to palpation surrounding IR drain, GILMER with serosanguinous output, ND  Ext: Good ROM of b/l upper and lower extremities, no BLE edema    Recent Labs     07/01/20  0315 07/01/20  1720 07/02/20  0430    131* 133   K 3.0* 4.1 3.6    101 104   CO2 23 20* 18*   BUN 9 8 7   CREATININE 0.5 0.6 0.5   GLUCOSE 174* 262* 220*   CALCIUM 8.2* 8.6 8.4*       Recent Labs     06/30/20  0600 07/01/20  0315 07/02/20  0430   WBC 12.6* 8.1 6.1   RBC 3.79 4.61 4.73   HGB 11.1* 13.2 13.4   HCT 32.9* 40.0 41.1   MCV 86.8 86.8 86.9   MCH 29.3 28.6 28.3   MCHC 33.7 33.0 32.6   RDW 12.1 12.4 12.9    105* 103* MPV 11.8 12.1* 11.6     Assessment:    Principal Problem:    Sepsis ([facility]  Active Problems:    Type 2 diabetes mellitus without complication, without long-term current use of insulin (HCC)    Abdominal pain    Liver lesion    SIRS (systemic inflammatory response syndrome) ([facility]  Resolved Problems:    * No resolved hospital problems. *      Plan:    Liver lesion, sepsis resolved: S/p IR drainage of complex 5.1 cm abscess of the right lobe of the liver. Monitor GILMER drain output, currently serosanguineous. Follow-up body fluid cultures. The patient is currently afebrile with WBC of 6.1 from 8.1. ID following, continue Rocephin and Flagyl. GI following. GERD: Continue home PPI. DM2: Continue insulin sliding scale. Monitor blood sugars. Hypothyroidism: Continue home Synthroid. DVT Prophylaxis: SCD's  Diet: Carb control    NOTE: This report was transcribed using voice recognition software. Every effort was made to ensure accuracy; however, inadvertent computerized transcription errors may be present.   Electronically signed by Sara Cancino MD on 7/2/2020 at 4:03 PM

## 2020-07-02 NOTE — PROGRESS NOTES
PRN  acetaminophen (TYLENOL) tablet 650 mg, Q6H PRN         Data Review  CBC:   Lab Results   Component Value Date    WBC 6.1 07/02/2020    RBC 4.73 07/02/2020    HGB 13.4 07/02/2020    HCT 41.1 07/02/2020    MCV 86.9 07/02/2020    MCH 28.3 07/02/2020    MCHC 32.6 07/02/2020    RDW 12.9 07/02/2020     07/02/2020    MPV 11.6 07/02/2020     CMP:    Lab Results   Component Value Date     07/02/2020    K 3.6 07/02/2020     07/02/2020    CO2 18 07/02/2020    BUN 7 07/02/2020    CREATININE 0.5 07/02/2020    GFRAA >60 07/02/2020    LABGLOM >60 07/02/2020    GLUCOSE 220 07/02/2020    PROT 6.7 07/02/2020    LABALBU 2.7 07/02/2020    CALCIUM 8.4 07/02/2020    BILITOT 1.0 07/02/2020    ALKPHOS 317 07/02/2020    AST 48 07/02/2020    ALT 52 07/02/2020     Hepatic Function Panel:    Lab Results   Component Value Date    ALKPHOS 317 07/02/2020    ALT 52 07/02/2020    AST 48 07/02/2020    PROT 6.7 07/02/2020    BILITOT 1.0 07/02/2020    BILIDIR 0.7 07/02/2020    IBILI 0.3 07/02/2020    LABALBU 2.7 07/02/2020     No components found for: CHLPLat  Lab Results   Component Value Date    TRIG 162 (H) 06/25/2020    TRIG 138 02/22/2020   e    LDLCALC 130 (H) 06/25/2020    LDLCALC 243 (H) 02/22/2020            L  Lab Results   Component Value Date    HDL 39 06/25/2020    HDL 61 02/22/2020   on  Lab Results   Component Value Date    LDLCALC 130 (H) 06/25/2020    LDLCALC 243 (H) 02/22/2020   ent Value Date    PROTIME 15.2 07/02/2020    INR 1.3 07/02/2020     IRON:    Lab Results   Component Value Date    IRON 14 06/29/2020     Iron Saturation:  No components found for: PERCENTFE  FERRITIN:    Lab Results   Component Value Date    FERRITIN 1,852 06/29/2020       Assessment:     Principal Problem:    · Liver lesion vs abscess   · Febrile with chills (103 PTA)  · Abdominal pain- LUQ & lower abdomen radiating into her back at times  · + BC- gram - rods; Klebsiella   · S/P carpal tunnel surgery   · GERD  · Nausea · Hypotensive- resolved  · DM- defer  · Leukocytosis   · S/P image guided liver abscess drain insertion 7/1/20 with Dr. Krishna Perdue  · Cultures- +Klebsiella    Plan:       · Continue drain management as ordered  · Carb controlled diet, as tolerated   · Antibiotic management per ID services, Rocephin & Flagyl  · Medical management per Primary Care  · Continue Protonix PO daily  · Continue to medicate for nausea as ordered  · Continue to trend labs  · Will follow    Discussed with Dr. Florez per Dr. Nick Palacio, NP-C 7/3/2020 9:28 AM For Dr. Leno Platt. I HAD A FACE TO FACE ENCOUNTER WITH THE PATIENT. AGREE WITH THE EXAM, ASSESSMENT, AND PLAN AS OUTLINED ABOVE. ADDITION AND CORRECTIONS WERE DONE AS DEEMED APPROPRIATE. MY EXAM AND PLAN INCLUDE: LOOKING MUCH BETTER AFTER DRAIN PLACEMENT. TOLERATING DIET. RESPONDING WELL TO ANTIBIOTIC THERAPY. WILL NEED TO INVESTIGATE THE SOURCE, DIVERTICULAR OR OTHERWISE. WILL PURSUE ONCE HER CURRENT TREATMENT IS CONCLUDED.

## 2020-07-02 NOTE — PROGRESS NOTES
solution, PRN  acetaminophen (TYLENOL) tablet 650 mg, Q6H PRN         Data Review  CBC:   Lab Results   Component Value Date    WBC 6.1 07/02/2020    RBC 4.73 07/02/2020    HGB 13.4 07/02/2020    HCT 41.1 07/02/2020    MCV 86.9 07/02/2020    MCH 28.3 07/02/2020    MCHC 32.6 07/02/2020    RDW 12.9 07/02/2020     07/02/2020    MPV 11.6 07/02/2020     CMP:    Lab Results   Component Value Date     07/02/2020    K 3.6 07/02/2020     07/02/2020    CO2 18 07/02/2020    BUN 7 07/02/2020    CREATININE 0.5 07/02/2020    GFRAA >60 07/02/2020    LABGLOM >60 07/02/2020    GLUCOSE 220 07/02/2020    PROT 6.7 07/02/2020    LABALBU 2.7 07/02/2020    CALCIUM 8.4 07/02/2020    BILITOT 1.0 07/02/2020    ALKPHOS 317 07/02/2020    AST 48 07/02/2020    ALT 52 07/02/2020     Hepatic Function Panel:    Lab Results   Component Value Date    ALKPHOS 317 07/02/2020    ALT 52 07/02/2020    AST 48 07/02/2020    PROT 6.7 07/02/2020    BILITOT 1.0 07/02/2020    BILIDIR 0.7 07/02/2020    IBILI 0.3 07/02/2020    LABALBU 2.7 07/02/2020     No components found for: CHLPL    Lab Results   Component Value Date    TRIG 162 (H) 06/25/2020    TRIG 138 02/22/2020       Lab Results   Component Value Date    HDL 39 06/25/2020    HDL 61 02/22/2020       Lab Results   Component Value Date    LDLCALC 130 (H) 06/25/2020    LDLCALC 243 (H) 02/22/2020       Lab Results   Component Value Date    LABVLDL 32 06/25/2020    LABVLDL 28 02/22/2020      PT/INR:    Lab Results   Component Value Date    PROTIME 15.2 07/02/2020    INR 1.3 07/02/2020     IRON:    Lab Results   Component Value Date    IRON 14 06/29/2020     Iron Saturation:  No components found for: PERCENTFE  FERRITIN:    Lab Results   Component Value Date    FERRITIN 1,852 06/29/2020       Assessment:     Principal Problem:    · Liver lesion vs abscess   · Febrile with chills (103 PTA)  · Abdominal pain- LUQ & lower abdomen radiating into her back at times  · + BC- gram - rods  · S/P carpal tunnel surgery   · GERD  · Nausea   · Hypotensive- resolved  · DM- defer  · Leukocytosis   · S/P image guided liver abscess drain insertion 7/1/20 with Dr. Samra Hebert:       · Liver serology- negative   · Tumor markers- normal  · Continue drain management   · Antibiotic management per ID services, Rocephin & Flagyl  · Medical management per Primary Care  · Continue Protonix PO daily  · Continue to medicate for nausea as ordered  · Continue to trend labs  · Will follow    Discussed with Dr. Nuzhat Douglas per Dr. Roslyn Guevara, NP-HEMANT 7/2/2020 2:01 PM For Dr. Aldo Courtney

## 2020-07-02 NOTE — PROGRESS NOTES
[address] [place] [address] [facility]  Progress Note      Chief Complaint   Patient presents with    Abdominal Pain     Lower abdominal pain x six days, EMS states she was having an anxiety attack. Patient states she is not currently having pain or anxiety    Fever     103.0 during triage, patient states chills. SUBJECTIVE:      Patient is tolerating medications. No reported adverse drug reactions. No problems overnight. Still some right upper quadrant discomfort due to drain. Review of systems:    As stated above in the chief complaint, otherwise negative. Medications:    Scheduled Meds:   cefTRIAXone (ROCEPHIN) IV  1 g Intravenous Q24H    metroNIDAZOLE  500 mg Oral 3 times per day    levothyroxine  25 mcg Oral Daily    pantoprazole  40 mg Oral QAM AC    sodium chloride flush  10 mL Intravenous 2 times per day    [Held by provider] enoxaparin  40 mg Subcutaneous Daily    insulin lispro  0-6 Units Subcutaneous TID WC     Continuous Infusions:   sodium chloride 125 mL/hr at 07/02/20 0336    dextrose       PRN Meds:sodium chloride flush, magnesium hydroxide, promethazine **OR** ondansetron, glucose, dextrose, glucagon (rDNA), dextrose, acetaminophen  Prior to Admission medications    Medication Sig Start Date End Date Taking? Authorizing Provider   glimepiride (AMARYL) 4 MG tablet Take 4 mg by mouth every morning (before breakfast)   Yes Historical Provider, MD   acetaminophen (TYLENOL) 325 MG tablet Take 650 mg by mouth every 6 hours as needed for Pain   Yes Historical Provider, MD   levothyroxine (SYNTHROID) 25 MCG tablet Take 1 tablet by mouth daily 6/26/20  Yes Lio Quezada, DO   Dulaglutide (TRULICITY) 1.77 QO/7.4OY SOPN Inject 0.75 mg into the skin once a week  Patient taking differently: Inject 0.75 mg into the skin once a week NEW MEDICINE AND IS PICKING UP TODAY.  Has not administered yet 6/26/20  Yes Mariajose Bean, DO   atorvastatin (LIPITOR) 20 MG tablet Take 1 tablet by mouth daily  Patient taking differently: Take 20 mg by mouth nightly  20  Yes Zahraa Jean DO   fluticasone (FLONASE) 50 MCG/ACT nasal spray 2 sprays by Each Nostril route daily 20  Yes Lio Quezada DO   pantoprazole (PROTONIX) 40 MG tablet Take 1 tablet by mouth every morning (before breakfast) 20  Yes Lio Quezada DO   ibuprofen (ADVIL;MOTRIN) 600 MG tablet Take 1 tablet by mouth 3 times daily as needed for Pain 6/15/20  Yes Eboni Wilson MD   glimepiride (AMARYL) 4 MG tablet Take 1 tablet by mouth every morning (before breakfast) 20   Lio Winters DO   Blood Glucose Monitoring Suppl (FREESTYLE FREEDOM LITE) w/Device KIT 1 kit by Does not apply route daily 3/4/20   Zahraa Jean DO   FreeStyle Lancets MISC 1 each by Does not apply route daily 3/4/20   Lio Quezada DO   blood glucose test strips (FREESTYLE LITE) strip 1 each by In Vitro route daily As needed. 3/4/20   Lio Winters DO       OBJECTIVE:  /78   Pulse 102   Temp 100.3 °F (37.9 °C) (Oral)   Resp 16   Ht 5' 1\" (1.549 m)   Wt 151 lb 4.8 oz (68.6 kg)   LMP 2020   SpO2 98%   BMI 28.59 kg/m²   Temp  Av.9 °F (37.7 °C)  Min: 98.1 °F (36.7 °C)  Max: 103 °F (39.4 °C)  General appearance: Resting in bed in no apparent distress. Skin: Warm and dry. No rashes were noted. HEENT: Round and reactive pupils. Moist mucous membranes. No ulcerations or thrush. Neck: Supple to movements. Chest: No use of accessory muscles to breathe. Symmetrical expansion. No wheezing, crackles or rhonchi. Cardiovascular: Reguar rate and rhythm. No murmurs gallops, or rubs appreciated. Abdomen: Drain in place right upper quadrant. Bowel sounds present, tender right upper quadrant, nondistended, no masses or hepatosplenomegaly. Extremities: No clubbing, no cyanosis, no edema.   Lines: peripheral    I/O last 3 completed shifts:  In: -   Out: 48 [Drains:50]      Laboratory and Tests list:    Principal Problem:    Sepsis ([facility]  Active Problems:    Type 2 diabetes mellitus without complication, without long-term current use of insulin (HCC)    Abdominal pain    Liver lesion    SIRS (systemic inflammatory response syndrome) ([facility]  Resolved Problems:    * No resolved hospital problems. *      ASSESSMENT:    · Pyogenic liver abscess-gram-negative rods  · Fever secondary to abscess  · Klebsiella bacteremia secondary liver abscess  · Leukopenia  · Elevation of transaminases associated to probable liver abscess    PLAN:    Discontinue Unasyn    Ceftriaxone 2 g IV daily    Metronidazole 500 mg p.o. 3 times daily      GEOFF Mckeon DO    1:25 PM  7/2/2020      Patient seen and examined. I had a face to face encounter with the patient. Agree with exam.  Assessment and plan as outlined above and directed by me. Addition and corrections were done as deemed appropriate. My exam and plan include: The patient is still having significant amount of pain because of the pigtail catheter itself and she is unable to take deep breaths.   Change Unasyn however to Ceftriaxone and oral Metronidazole    Eusebio Oconnell  7/2/2020  3:40 PM

## 2020-07-03 LAB
AFP-TUMOR MARKER: 1 NG/ML (ref 0–9)
ALBUMIN SERPL-MCNC: 2.7 G/DL (ref 3.5–5.2)
ALP BLD-CCNC: 293 U/L (ref 35–104)
ALPHA-1 ANTITRYPSIN: 248 MG/DL (ref 90–200)
ALT SERPL-CCNC: 38 U/L (ref 0–32)
AMMONIA: 29.9 UMOL/L (ref 11–51)
ANAEROBIC CULTURE: NORMAL
ANCA IFA: NORMAL
ANION GAP SERPL CALCULATED.3IONS-SCNC: 13 MMOL/L (ref 7–16)
AST SERPL-CCNC: 23 U/L (ref 0–31)
BASOPHILS ABSOLUTE: 0.04 E9/L (ref 0–0.2)
BASOPHILS RELATIVE PERCENT: 0.3 % (ref 0–2)
BILIRUB SERPL-MCNC: 1.2 MG/DL (ref 0–1.2)
BILIRUBIN DIRECT: 0.9 MG/DL (ref 0–0.3)
BILIRUBIN, INDIRECT: 0.3 MG/DL (ref 0–1)
BODY FLUID CULTURE, STERILE: ABNORMAL
BUN BLDV-MCNC: 4 MG/DL (ref 6–20)
CALCIUM SERPL-MCNC: 8.3 MG/DL (ref 8.6–10.2)
CHLORIDE BLD-SCNC: 102 MMOL/L (ref 98–107)
CO2: 18 MMOL/L (ref 22–29)
CREAT SERPL-MCNC: 0.5 MG/DL (ref 0.5–1)
EOSINOPHILS ABSOLUTE: 0.01 E9/L (ref 0.05–0.5)
EOSINOPHILS RELATIVE PERCENT: 0.1 % (ref 0–6)
GFR AFRICAN AMERICAN: >60
GFR NON-AFRICAN AMERICAN: >60 ML/MIN/1.73
GLUCOSE BLD-MCNC: 222 MG/DL (ref 74–99)
GRAM STAIN RESULT: ABNORMAL
HCT VFR BLD CALC: 26.8 % (ref 34–48)
HCT VFR BLD CALC: 28.1 % (ref 34–48)
HEMOGLOBIN: 8.8 G/DL (ref 11.5–15.5)
HEMOGLOBIN: 9.2 G/DL (ref 11.5–15.5)
IGG 1: 758 MG/DL (ref 240–1118)
IGG 2: 370 MG/DL (ref 124–549)
IGG 3: 123 MG/DL (ref 21–134)
IGG 4: 57 MG/DL (ref 1–123)
IMMATURE GRANULOCYTES #: 0.08 E9/L
IMMATURE GRANULOCYTES %: 0.6 % (ref 0–5)
INR BLD: 1.2
LYMPHOCYTES ABSOLUTE: 1.26 E9/L (ref 1.5–4)
LYMPHOCYTES RELATIVE PERCENT: 9.6 % (ref 20–42)
MAGNESIUM: 1.6 MG/DL (ref 1.6–2.6)
MCH RBC QN AUTO: 28.4 PG (ref 26–35)
MCH RBC QN AUTO: 28.7 PG (ref 26–35)
MCHC RBC AUTO-ENTMCNC: 32.7 % (ref 32–34.5)
MCHC RBC AUTO-ENTMCNC: 32.8 % (ref 32–34.5)
MCV RBC AUTO: 86.5 FL (ref 80–99.9)
MCV RBC AUTO: 87.5 FL (ref 80–99.9)
METER GLUCOSE: 205 MG/DL (ref 74–99)
METER GLUCOSE: 244 MG/DL (ref 74–99)
METER GLUCOSE: 260 MG/DL (ref 74–99)
MONOCYTES ABSOLUTE: 0.66 E9/L (ref 0.1–0.95)
MONOCYTES RELATIVE PERCENT: 5 % (ref 2–12)
NEUTROPHILS ABSOLUTE: 11.13 E9/L (ref 1.8–7.3)
NEUTROPHILS RELATIVE PERCENT: 84.4 % (ref 43–80)
ORGANISM: ABNORMAL
PDW BLD-RTO: 13.2 FL (ref 11.5–15)
PDW BLD-RTO: 13.2 FL (ref 11.5–15)
PLATELET # BLD: 162 E9/L (ref 130–450)
PLATELET # BLD: 177 E9/L (ref 130–450)
PMV BLD AUTO: 11.3 FL (ref 7–12)
PMV BLD AUTO: 11.5 FL (ref 7–12)
POTASSIUM REFLEX MAGNESIUM: 3.3 MMOL/L (ref 3.5–5)
PROTHROMBIN TIME: 14.5 SEC (ref 9.3–12.4)
RBC # BLD: 3.1 E12/L (ref 3.5–5.5)
RBC # BLD: 3.21 E12/L (ref 3.5–5.5)
SODIUM BLD-SCNC: 133 MMOL/L (ref 132–146)
TOTAL PROTEIN: 6.6 G/DL (ref 6.4–8.3)
WBC # BLD: 13.2 E9/L (ref 4.5–11.5)
WBC # BLD: 15.1 E9/L (ref 4.5–11.5)

## 2020-07-03 PROCEDURE — 6360000002 HC RX W HCPCS: Performed by: INTERNAL MEDICINE

## 2020-07-03 PROCEDURE — 6370000000 HC RX 637 (ALT 250 FOR IP): Performed by: INTERNAL MEDICINE

## 2020-07-03 PROCEDURE — 80076 HEPATIC FUNCTION PANEL: CPT

## 2020-07-03 PROCEDURE — 82140 ASSAY OF AMMONIA: CPT

## 2020-07-03 PROCEDURE — 85025 COMPLETE CBC W/AUTO DIFF WBC: CPT

## 2020-07-03 PROCEDURE — 2060000000 HC ICU INTERMEDIATE R&B

## 2020-07-03 PROCEDURE — 36415 COLL VENOUS BLD VENIPUNCTURE: CPT

## 2020-07-03 PROCEDURE — 85027 COMPLETE CBC AUTOMATED: CPT

## 2020-07-03 PROCEDURE — 2580000003 HC RX 258: Performed by: INTERNAL MEDICINE

## 2020-07-03 PROCEDURE — 82962 GLUCOSE BLOOD TEST: CPT

## 2020-07-03 PROCEDURE — 99233 SBSQ HOSP IP/OBS HIGH 50: CPT | Performed by: INTERNAL MEDICINE

## 2020-07-03 PROCEDURE — 80048 BASIC METABOLIC PNL TOTAL CA: CPT

## 2020-07-03 PROCEDURE — 83735 ASSAY OF MAGNESIUM: CPT

## 2020-07-03 PROCEDURE — 85610 PROTHROMBIN TIME: CPT

## 2020-07-03 RX ORDER — SODIUM CHLORIDE 0.9 % (FLUSH) 0.9 %
10 SYRINGE (ML) INJECTION PRN
Status: DISCONTINUED | OUTPATIENT
Start: 2020-07-03 | End: 2020-07-05 | Stop reason: HOSPADM

## 2020-07-03 RX ORDER — MAGNESIUM SULFATE IN WATER 40 MG/ML
4 INJECTION, SOLUTION INTRAVENOUS ONCE
Status: COMPLETED | OUTPATIENT
Start: 2020-07-03 | End: 2020-07-03

## 2020-07-03 RX ORDER — HEPARIN SODIUM (PORCINE) LOCK FLUSH IV SOLN 100 UNIT/ML 100 UNIT/ML
3 SOLUTION INTRAVENOUS EVERY 12 HOURS SCHEDULED
Status: DISCONTINUED | OUTPATIENT
Start: 2020-07-03 | End: 2020-07-05 | Stop reason: HOSPADM

## 2020-07-03 RX ORDER — METRONIDAZOLE 500 MG/1
500 TABLET ORAL EVERY 8 HOURS SCHEDULED
Qty: 126 TABLET | Refills: 0 | Status: SHIPPED | OUTPATIENT
Start: 2020-07-03 | End: 2020-08-14

## 2020-07-03 RX ORDER — HEPARIN SODIUM (PORCINE) LOCK FLUSH IV SOLN 100 UNIT/ML 100 UNIT/ML
3 SOLUTION INTRAVENOUS PRN
Status: DISCONTINUED | OUTPATIENT
Start: 2020-07-03 | End: 2020-07-05 | Stop reason: HOSPADM

## 2020-07-03 RX ORDER — POTASSIUM CHLORIDE 20 MEQ/1
40 TABLET, EXTENDED RELEASE ORAL EVERY 4 HOURS
Status: COMPLETED | OUTPATIENT
Start: 2020-07-03 | End: 2020-07-03

## 2020-07-03 RX ORDER — LIDOCAINE HYDROCHLORIDE 10 MG/ML
5 INJECTION, SOLUTION EPIDURAL; INFILTRATION; INTRACAUDAL; PERINEURAL ONCE
Status: COMPLETED | OUTPATIENT
Start: 2020-07-03 | End: 2020-07-04

## 2020-07-03 RX ADMIN — INSULIN LISPRO 2 UNITS: 100 INJECTION, SOLUTION INTRAVENOUS; SUBCUTANEOUS at 11:43

## 2020-07-03 RX ADMIN — Medication 300 UNITS: at 20:19

## 2020-07-03 RX ADMIN — POTASSIUM CHLORIDE 40 MEQ: 20 TABLET, EXTENDED RELEASE ORAL at 11:43

## 2020-07-03 RX ADMIN — MAGNESIUM SULFATE 4 G: 4 INJECTION INTRAVENOUS at 11:43

## 2020-07-03 RX ADMIN — POTASSIUM CHLORIDE 40 MEQ: 20 TABLET, EXTENDED RELEASE ORAL at 17:35

## 2020-07-03 RX ADMIN — METRONIDAZOLE 500 MG: 500 TABLET, FILM COATED ORAL at 07:56

## 2020-07-03 RX ADMIN — METRONIDAZOLE 500 MG: 500 TABLET, FILM COATED ORAL at 14:02

## 2020-07-03 RX ADMIN — INSULIN LISPRO 2 UNITS: 100 INJECTION, SOLUTION INTRAVENOUS; SUBCUTANEOUS at 06:22

## 2020-07-03 RX ADMIN — METRONIDAZOLE 500 MG: 500 TABLET, FILM COATED ORAL at 20:19

## 2020-07-03 RX ADMIN — ONDANSETRON 4 MG: 2 INJECTION INTRAMUSCULAR; INTRAVENOUS at 22:53

## 2020-07-03 RX ADMIN — WATER 2 G: 1 INJECTION INTRAMUSCULAR; INTRAVENOUS; SUBCUTANEOUS at 14:02

## 2020-07-03 RX ADMIN — ACETAMINOPHEN 650 MG: 325 TABLET ORAL at 15:09

## 2020-07-03 RX ADMIN — LEVOTHYROXINE SODIUM 25 MCG: 25 TABLET ORAL at 06:22

## 2020-07-03 RX ADMIN — SODIUM CHLORIDE: 9 INJECTION, SOLUTION INTRAVENOUS at 22:53

## 2020-07-03 RX ADMIN — SODIUM CHLORIDE, PRESERVATIVE FREE 10 ML: 5 INJECTION INTRAVENOUS at 08:41

## 2020-07-03 RX ADMIN — PANTOPRAZOLE SODIUM 40 MG: 40 TABLET, DELAYED RELEASE ORAL at 06:22

## 2020-07-03 RX ADMIN — INSULIN LISPRO 3 UNITS: 100 INJECTION, SOLUTION INTRAVENOUS; SUBCUTANEOUS at 17:35

## 2020-07-03 ASSESSMENT — PAIN SCALES - GENERAL
PAINLEVEL_OUTOF10: 0
PAINLEVEL_OUTOF10: 5

## 2020-07-03 ASSESSMENT — PAIN DESCRIPTION - LOCATION: LOCATION: HEAD

## 2020-07-03 ASSESSMENT — PAIN DESCRIPTION - ONSET: ONSET: GRADUAL

## 2020-07-03 ASSESSMENT — PAIN DESCRIPTION - FREQUENCY: FREQUENCY: INTERMITTENT

## 2020-07-03 ASSESSMENT — PAIN DESCRIPTION - DESCRIPTORS: DESCRIPTORS: ACHING

## 2020-07-03 NOTE — PROGRESS NOTES
28.3 28.4 28.7   MCHC 32.6 32.8 32.7   RDW 12.9 13.2 13.2   * 162 177   MPV 11.6 11.3 11.5     Assessment:    Principal Problem:    Sepsis (HCC)  Active Problems:    Type 2 diabetes mellitus without complication, without long-term current use of insulin (HCC)    Abdominal pain    Liver lesion    SIRS (systemic inflammatory response syndrome) ([facility]  Resolved Problems:    * No resolved hospital problems. *      Plan:    Liver abscess, sepsis resolved: S/p IR drainage of complex 5.1 cm abscess of the right lobe of the liver. Monitor GILMER drain output, currently serosanguineous. Body fluid cultures positive for Klebsiella. The patient is currently afebrile with WBC of 15.1 from 6.1. ID following, continue Rocephin and Flagyl which will be needed for 6 weeks. GI following. Klebsiella pneumoniae bacteremia: Secondary to liver abscess. ID following, the patient will require Rocephin and Flagyl for 6 weeks. Will place PICC. GERD: Continue home PPI. DM2: Continue insulin sliding scale. Monitor blood sugars. Hypothyroidism: Continue home Synthroid. DVT Prophylaxis: SCD's  Diet: Carb control    NOTE: This report was transcribed using voice recognition software. Every effort was made to ensure accuracy; however, inadvertent computerized transcription errors may be present.   Electronically signed by Dana Alvarez MD on 7/3/2020 at 6:56 PM

## 2020-07-03 NOTE — PROGRESS NOTES
(SYNTHROID) 25 MCG tablet Take 1 tablet by mouth daily 20  Yes Lio Quezada DO   Dulaglutide (TRULICITY) 0.86 KD/4.2YF SOPN Inject 0.75 mg into the skin once a week  Patient taking differently: Inject 0.75 mg into the skin once a week NEW MEDICINE AND IS PICKING UP TODAY. Has not administered yet 20  Yes Zahraa Jean DO   atorvastatin (LIPITOR) 20 MG tablet Take 1 tablet by mouth daily  Patient taking differently: Take 20 mg by mouth nightly  20  Yes Lio Quezada DO   fluticasone (FLONASE) 50 MCG/ACT nasal spray 2 sprays by Each Nostril route daily 20  Yes Lio Quezada DO   pantoprazole (PROTONIX) 40 MG tablet Take 1 tablet by mouth every morning (before breakfast) 20  Yes Lio Quezada DO   ibuprofen (ADVIL;MOTRIN) 600 MG tablet Take 1 tablet by mouth 3 times daily as needed for Pain 6/15/20  Yes Eboni Wilson MD   glimepiride (AMARYL) 4 MG tablet Take 1 tablet by mouth every morning (before breakfast) 20   Lio Winters DO   Blood Glucose Monitoring Suppl (FREESTYLE FREEDOM LITE) w/Device KIT 1 kit by Does not apply route daily 3/4/20   Zahraa Jean DO   FreeStyle Lancets MISC 1 each by Does not apply route daily 3/4/20   Lio Quezada DO   blood glucose test strips (FREESTYLE LITE) strip 1 each by In Vitro route daily As needed. 3/4/20   Lio Winters DO       OBJECTIVE:  /86   Pulse 102   Temp 98.2 °F (36.8 °C)   Resp 16   Ht 5' 1\" (1.549 m)   Wt 159 lb (72.1 kg)   LMP 2020   SpO2 93%   BMI 30.04 kg/m²   Temp  Av °F (37.2 °C)  Min: 98.2 °F (36.8 °C)  Max: 99.5 °F (37.5 °C)  General appearance: Resting in bed in no apparent distress. Skin: Warm and dry. No rashes were noted. HEENT: Round and reactive pupils. Moist mucous membranes. No ulcerations or thrush. Neck: Supple to movements. Chest: No use of accessory muscles to breathe. Diminished breath sounds right lower lobe.   No wheezing, crackles or rhonchi. Cardiovascular: Reguar rate and rhythm. No murmurs gallops, or rubs appreciated. Abdomen: Drain in place right upper quadrant, still draining slight serosanguineous fluid. Bowel sounds present, nontender, nondistended, no masses or hepatosplenomegaly. Extremities: No clubbing, no cyanosis, no edema. Lines: peripheral    I/O last 3 completed shifts: In: 8528 [P. O.:780; I.V.:992]  Out: 1010 [Urine:1000; Drains:10]      Laboratory and Tests Review:      Lab Results   Component Value Date    WBC 15.1 (H) 07/03/2020    WBC 13.2 (H) 07/03/2020    WBC 6.1 07/02/2020    HGB 9.2 (L) 07/03/2020    HCT 28.1 (L) 07/03/2020    MCV 87.5 07/03/2020     07/03/2020     Lab Results   Component Value Date    NEUTROABS 11.13 (H) 07/03/2020    NEUTROABS 4.96 07/02/2020    NEUTROABS 6.23 07/01/2020     No results found for: [facility]  Lab Results   Component Value Date    ALT 38 (H) 07/03/2020    AST 23 07/03/2020     (H) 06/29/2020    ALKPHOS 293 (H) 07/03/2020    BILITOT 1.2 07/03/2020     Lab Results   Component Value Date     07/03/2020    K 3.3 07/03/2020     07/03/2020    CO2 18 07/03/2020    BUN 4 07/03/2020    CREATININE 0.5 07/03/2020    CREATININE 0.5 07/02/2020    CREATININE 0.6 07/01/2020    GFRAA >60 07/03/2020    LABGLOM >60 07/03/2020    GLUCOSE 222 07/03/2020    PROT 6.6 07/03/2020    LABALBU 2.7 07/03/2020    CALCIUM 8.3 07/03/2020    BILITOT 1.2 07/03/2020    ALKPHOS 293 07/03/2020    AST 23 07/03/2020    ALT 38 07/03/2020     Lab Results   Component Value Date    CRP 17.7 (H) 06/29/2020     Lab Results   Component Value Date    SEDRATE 111 (H) 06/29/2020       Radiology:    CT ABSCESS DRAINAGE W CATH PLACEMENT S&I   Final Result   Successful uncomplicated placement of a drainage catheter into an   intrahepatic fluid collection within the right lobe the liver. MRI ABDOMEN W WO CONTRAST   Final Result      1.  Lobular, multiloculated lesion is located within posterior segment   right hepatic lobe. This lesion shows no enhancement, however there is   increased signal on diffusion-weighted imaging suggestive of high   cellularity which would be associated with hepatic abscess. Carcinoma   is a lesser possibility. Clinical correlation recommended. Short-term   follow-up may be helpful for further evaluation. 2. No additional liver lesion evident. 3. No intrahepatic or extrahepatic bile duct dilatation. XR CHEST PORTABLE   Final Result      Status post placement of left IJ central venous catheter with distal   tip at level of right atrial/caval junction or right atrium. US ABDOMEN LIMITED   Final Result   Ill-defined liver lesion which is indeterminate. CT-guided core biopsy   is recommended               XR CHEST PORTABLE   Final Result   Normal chest               CT ABDOMEN PELVIS WO CONTRAST Additional Contrast? None   Final Result   Complex lesion within the right lobe of the liver. This is   indeterminant. Recommend follow-up postcontrast. Malignancy cannot be   excluded.          CT TUBE CATHETER FOLLOW UP    (Results Pending)       Microbiology:   Lab Results   Component Value Date    BC 24 Hours no growth 07/01/2020    BC  06/29/2020     Refer to previous CXBL2 culture collected from [facility] at 0942 on 6-29-20  for susceptibility results      ORG Klebsiella pneumoniae ssp pneumoniae 07/01/2020    ORG Klebsiella pneumoniae ssp pneumoniae 06/29/2020    ORG Klebsiella pneumoniae ssp pneumoniae 06/29/2020     Lab Results   Component Value Date    BLOODCULT2 24 Hours no growth 07/01/2020    ORG Klebsiella pneumoniae ssp pneumoniae 07/01/2020    ORG Klebsiella pneumoniae ssp pneumoniae 06/29/2020    ORG Klebsiella pneumoniae ssp pneumoniae 06/29/2020     No results found for: WNDABS  No results found for: RESPSMEAR  No results found for: MPNEUMO, CLAMYDCU, LABLEGI, AFBCX, FUNGSM, LABFUNG  No results found for: CULTRESP  No results found for: CXCATHTIP  Body Fluid pigtail catheter and is avoiding taking deep breaths. She is tolerating antibiotics. So far we only have Klebsiella in the blood. She may have anaerobes in the liver abscess. She can have a PICC and be discharged on IV and oral antibiotics x6 weeks.     Ghazal Wheatley  7/3/2020  10:37 AM

## 2020-07-04 LAB
ALBUMIN SERPL-MCNC: 2.4 G/DL (ref 3.5–5.2)
ALP BLD-CCNC: 286 U/L (ref 35–104)
ALT SERPL-CCNC: 27 U/L (ref 0–32)
AMMONIA: 36.1 UMOL/L (ref 11–51)
ANION GAP SERPL CALCULATED.3IONS-SCNC: 12 MMOL/L (ref 7–16)
AST SERPL-CCNC: 15 U/L (ref 0–31)
BASOPHILS ABSOLUTE: 0.03 E9/L (ref 0–0.2)
BASOPHILS RELATIVE PERCENT: 0.2 % (ref 0–2)
BILIRUB SERPL-MCNC: 0.9 MG/DL (ref 0–1.2)
BILIRUBIN DIRECT: 0.6 MG/DL (ref 0–0.3)
BILIRUBIN, INDIRECT: 0.3 MG/DL (ref 0–1)
BUN BLDV-MCNC: 4 MG/DL (ref 6–20)
CALCIUM SERPL-MCNC: 7.8 MG/DL (ref 8.6–10.2)
CHLORIDE BLD-SCNC: 104 MMOL/L (ref 98–107)
CO2: 19 MMOL/L (ref 22–29)
CREAT SERPL-MCNC: 0.4 MG/DL (ref 0.5–1)
EOSINOPHILS ABSOLUTE: 0.02 E9/L (ref 0.05–0.5)
EOSINOPHILS RELATIVE PERCENT: 0.1 % (ref 0–6)
GFR AFRICAN AMERICAN: >60
GFR NON-AFRICAN AMERICAN: >60 ML/MIN/1.73
GLUCOSE BLD-MCNC: 224 MG/DL (ref 74–99)
HCT VFR BLD CALC: 26.4 % (ref 34–48)
HEMOGLOBIN: 8.7 G/DL (ref 11.5–15.5)
IMMATURE GRANULOCYTES #: 0.2 E9/L
IMMATURE GRANULOCYTES %: 1.4 % (ref 0–5)
INR BLD: 1.3
LYMPHOCYTES ABSOLUTE: 1.52 E9/L (ref 1.5–4)
LYMPHOCYTES RELATIVE PERCENT: 10.8 % (ref 20–42)
MCH RBC QN AUTO: 28.5 PG (ref 26–35)
MCHC RBC AUTO-ENTMCNC: 33 % (ref 32–34.5)
MCV RBC AUTO: 86.6 FL (ref 80–99.9)
METER GLUCOSE: 227 MG/DL (ref 74–99)
METER GLUCOSE: 262 MG/DL (ref 74–99)
METER GLUCOSE: 279 MG/DL (ref 74–99)
METER GLUCOSE: 306 MG/DL (ref 74–99)
MONOCYTES ABSOLUTE: 0.75 E9/L (ref 0.1–0.95)
MONOCYTES RELATIVE PERCENT: 5.3 % (ref 2–12)
NEUTROPHILS ABSOLUTE: 11.61 E9/L (ref 1.8–7.3)
NEUTROPHILS RELATIVE PERCENT: 82.2 % (ref 43–80)
PDW BLD-RTO: 13.4 FL (ref 11.5–15)
PLATELET # BLD: 204 E9/L (ref 130–450)
PMV BLD AUTO: 11 FL (ref 7–12)
POTASSIUM REFLEX MAGNESIUM: 3.6 MMOL/L (ref 3.5–5)
PROTHROMBIN TIME: 15.7 SEC (ref 9.3–12.4)
RBC # BLD: 3.05 E12/L (ref 3.5–5.5)
SODIUM BLD-SCNC: 135 MMOL/L (ref 132–146)
TOTAL PROTEIN: 6.3 G/DL (ref 6.4–8.3)
WBC # BLD: 14.1 E9/L (ref 4.5–11.5)

## 2020-07-04 PROCEDURE — 6370000000 HC RX 637 (ALT 250 FOR IP): Performed by: INTERNAL MEDICINE

## 2020-07-04 PROCEDURE — 99233 SBSQ HOSP IP/OBS HIGH 50: CPT | Performed by: INTERNAL MEDICINE

## 2020-07-04 PROCEDURE — 80076 HEPATIC FUNCTION PANEL: CPT

## 2020-07-04 PROCEDURE — 82140 ASSAY OF AMMONIA: CPT

## 2020-07-04 PROCEDURE — 02HV33Z INSERTION OF INFUSION DEVICE INTO SUPERIOR VENA CAVA, PERCUTANEOUS APPROACH: ICD-10-PCS | Performed by: RADIOLOGY

## 2020-07-04 PROCEDURE — 2580000003 HC RX 258: Performed by: INTERNAL MEDICINE

## 2020-07-04 PROCEDURE — 36415 COLL VENOUS BLD VENIPUNCTURE: CPT

## 2020-07-04 PROCEDURE — 76937 US GUIDE VASCULAR ACCESS: CPT

## 2020-07-04 PROCEDURE — 2500000003 HC RX 250 WO HCPCS: Performed by: INTERNAL MEDICINE

## 2020-07-04 PROCEDURE — 6360000002 HC RX W HCPCS: Performed by: INTERNAL MEDICINE

## 2020-07-04 PROCEDURE — C1751 CATH, INF, PER/CENT/MIDLINE: HCPCS

## 2020-07-04 PROCEDURE — 85025 COMPLETE CBC W/AUTO DIFF WBC: CPT

## 2020-07-04 PROCEDURE — 80048 BASIC METABOLIC PNL TOTAL CA: CPT

## 2020-07-04 PROCEDURE — 2060000000 HC ICU INTERMEDIATE R&B

## 2020-07-04 PROCEDURE — 82962 GLUCOSE BLOOD TEST: CPT

## 2020-07-04 PROCEDURE — 85610 PROTHROMBIN TIME: CPT

## 2020-07-04 PROCEDURE — 36569 INSJ PICC 5 YR+ W/O IMAGING: CPT

## 2020-07-04 RX ORDER — POTASSIUM CHLORIDE 20 MEQ/1
40 TABLET, EXTENDED RELEASE ORAL ONCE
Status: COMPLETED | OUTPATIENT
Start: 2020-07-04 | End: 2020-07-04

## 2020-07-04 RX ADMIN — LEVOTHYROXINE SODIUM 25 MCG: 25 TABLET ORAL at 06:18

## 2020-07-04 RX ADMIN — INSULIN LISPRO 2 UNITS: 100 INJECTION, SOLUTION INTRAVENOUS; SUBCUTANEOUS at 06:18

## 2020-07-04 RX ADMIN — INSULIN LISPRO 3 UNITS: 100 INJECTION, SOLUTION INTRAVENOUS; SUBCUTANEOUS at 17:25

## 2020-07-04 RX ADMIN — LIDOCAINE HYDROCHLORIDE 2 ML: 10 INJECTION, SOLUTION EPIDURAL; INFILTRATION; INTRACAUDAL; PERINEURAL at 11:35

## 2020-07-04 RX ADMIN — POTASSIUM CHLORIDE 40 MEQ: 20 TABLET, EXTENDED RELEASE ORAL at 09:14

## 2020-07-04 RX ADMIN — SODIUM CHLORIDE, PRESERVATIVE FREE 10 ML: 5 INJECTION INTRAVENOUS at 21:24

## 2020-07-04 RX ADMIN — METRONIDAZOLE 500 MG: 500 TABLET, FILM COATED ORAL at 14:26

## 2020-07-04 RX ADMIN — METRONIDAZOLE 500 MG: 500 TABLET, FILM COATED ORAL at 06:18

## 2020-07-04 RX ADMIN — INSULIN LISPRO 4 UNITS: 100 INJECTION, SOLUTION INTRAVENOUS; SUBCUTANEOUS at 12:01

## 2020-07-04 RX ADMIN — METRONIDAZOLE 500 MG: 500 TABLET, FILM COATED ORAL at 21:24

## 2020-07-04 RX ADMIN — PANTOPRAZOLE SODIUM 40 MG: 40 TABLET, DELAYED RELEASE ORAL at 06:18

## 2020-07-04 RX ADMIN — WATER 2 G: 1 INJECTION INTRAMUSCULAR; INTRAVENOUS; SUBCUTANEOUS at 14:26

## 2020-07-04 RX ADMIN — Medication 300 UNITS: at 21:24

## 2020-07-04 RX ADMIN — ONDANSETRON 4 MG: 2 INJECTION INTRAMUSCULAR; INTRAVENOUS at 23:39

## 2020-07-04 RX ADMIN — ACETAMINOPHEN 650 MG: 325 TABLET ORAL at 17:28

## 2020-07-04 ASSESSMENT — PAIN SCALES - GENERAL
PAINLEVEL_OUTOF10: 0
PAINLEVEL_OUTOF10: 5
PAINLEVEL_OUTOF10: 0
PAINLEVEL_OUTOF10: 0

## 2020-07-04 NOTE — PROGRESS NOTES
[facility] Progress Note    Admitting Date and Time: 6/29/2020  8:57 AM  Admit Dx: Sepsis ([facility] [address] [A41.9]  Sepsis ([facility] [address] [A41.9]    Subjective:  Patient is being followed for Sepsis ([facility] [address] [A41.9]  Sepsis ([facility] [address] [A41.9]     The patient states that she is feeling well and is tolerating a diet. She is ready to go home.      lidocaine PF  5 mL Intradermal Once    heparin flush  3 mL Intravenous 2 times per day    metroNIDAZOLE  500 mg Oral 3 times per day    cefTRIAXone (ROCEPHIN) IV  2 g Intravenous Q24H    levothyroxine  25 mcg Oral Daily    pantoprazole  40 mg Oral QAM AC    sodium chloride flush  10 mL Intravenous 2 times per day    [Held by provider] enoxaparin  40 mg Subcutaneous Daily    insulin lispro  0-6 Units Subcutaneous TID WC     sodium chloride flush, 10 mL, PRN  heparin flush, 3 mL, PRN  sodium chloride flush, 10 mL, PRN  magnesium hydroxide, 30 mL, Daily PRN  promethazine, 12.5 mg, Q6H PRN    Or  ondansetron, 4 mg, Q6H PRN  glucose, 15 g, PRN  dextrose, 12.5 g, PRN  glucagon (rDNA), 1 mg, PRN  dextrose, 100 mL/hr, PRN  acetaminophen, 650 mg, Q6H PRN         Objective:    /87   Pulse 94   Temp 98.4 °F (36.9 °C) (Oral)   Resp 16   Ht 5' 1\" (1.549 m)   Wt 161 lb 11.2 oz (73.3 kg)   LMP 06/08/2020   SpO2 94%   BMI 30.55 kg/m²     Gen: NAD, AAOx3, sitting in chair  HEENT: NCAT  CV: Tachycardia, no m/r/g  Resp: Equal chest rise b/l, CTAB  Abd: Soft, mild R flank tenderness to palpation surrounding IR drain, GILMER with serosanguinous output, ND  Ext: Good ROM of b/l upper and lower extremities, 1+ BLE edema    Recent Labs     07/02/20  0430 07/03/20  0410 07/04/20  0340    133 135   K 3.6 3.3* 3.6    102 104   CO2 18* 18* 19*   BUN 7 4* 4*   CREATININE 0.5 0.5 0.4*   GLUCOSE 220* 222* 224*   CALCIUM 8.4* 8.3* 7.8*       Recent Labs     07/03/20  0410 07/03/20  0900 07/04/20  0340   WBC 13.2* 15.1* 14.1*   RBC 3.10* 3.21* 3.05*   HGB 8.8* 9.2* 8.7*   HCT 26.8* 28.1* 26.4*   MCV 86.5 87.5 86.6   MCH 28.4 28.7 28.5   MCHC 32.8 32.7 33.0   RDW 13.2 13.2 13.4    177 204   MPV 11.3 11.5 11.0     Assessment:    Principal Problem:    Sepsis (HCC)  Active Problems:    Type 2 diabetes mellitus without complication, without long-term current use of insulin (HCC)    Abdominal pain    Liver lesion    SIRS (systemic inflammatory response syndrome) (HCC)  Resolved Problems:    * No resolved hospital problems. *      Plan:    Liver abscess, sepsis resolved: S/p IR drainage of complex 5.1 cm abscess of the right lobe of the liver. Monitor GILMER drain output, currently serosanguineous. Body fluid cultures positive for Klebsiella. The patient is currently afebrile with WBC of 14.1 from 15.1. ID following, continue Rocephin and Flagyl which will be needed for 6 weeks. PICC placement today. GI following. Will discuss with ID and IR if patient can have her drain removed prior to discharge. Klebsiella pneumoniae bacteremia: Secondary to liver abscess. ID following, the patient will require Rocephin and Flagyl for 6 weeks. GERD: Continue home PPI. DM2: Continue insulin sliding scale. Monitor blood sugars. Hypothyroidism: Continue home Synthroid. DVT Prophylaxis: SCD's  Diet: Carb control    NOTE: This report was transcribed using voice recognition software. Every effort was made to ensure accuracy; however, inadvertent computerized transcription errors may be present.   Electronically signed by Raul Jefferson MD on 7/4/2020 at 9:36 AM

## 2020-07-04 NOTE — PROCEDURES
PICC   Catheter insertion date 7/4/2020     Product Number:  [id number]   Lot No: [id number]   Gauge: 4 fr   Lumen: single   Rt Brachial    Vein Diameter : 0.36 cm   Upper arm circumference (10CM ABOVE AC): 30.5 cm   Catheter Length : 33 cm(17 cm cut from a 50 cm line)   Internal Length: 31 cm   External Catheter Length: 2 cm   Ultrasound Used:yes  [facility] [place] confirms PICC tip is placed in the lower 1/3 of the SVC or at the Cavoatrial junction. Floor nurse notified PICC is okay to use.    : Jim Alarcon RN

## 2020-07-04 NOTE — PROGRESS NOTES
Dr. Bhargavi Urias notified that home care is unclear as to what medication was priced (Unasyn versus Rocephin). They will have a for sure answer tomorrow 7/5.

## 2020-07-04 NOTE — PLAN OF CARE
Problem: Safety:  Goal: Free from accidental physical injury  Description: Free from accidental physical injury  Outcome: Met This Shift  Goal: Free from intentional harm  Description: Free from intentional harm  Outcome: Met This Shift     Problem: Falls - Risk of:  Goal: Will remain free from falls  Description: Will remain free from falls  Outcome: Met This Shift  Goal: Absence of physical injury  Description: Absence of physical injury  Outcome: Met This Shift     Problem: Body Temperature - Imbalanced:  Goal: Body temperature is within normal range  Outcome: Met This Shift     Problem: INFECTION  Goal: Patient exhibits no signs of infection.   Outcome: Met This Shift     Problem: Pain:  Goal: Pain level will decrease  Description: Pain level will decrease     Outcome: Met This Shift     Problem: FALL RISK  Goal: Absence of falls  Outcome: Met This Shift

## 2020-07-04 NOTE — PROGRESS NOTES
[address] [place] [address] [facility]  [facility]  Progress Note      Chief Complaint   Patient presents with    Abdominal Pain     Lower abdominal pain x six days, EMS states she was having an anxiety attack. Patient states she is not currently having pain or anxiety    Fever     103.0 during triage, patient states chills. SUBJECTIVE:      Patient is tolerating medications. No reported adverse drug reactions. No problems overnight. Minimal drainage from pigtail catheter      Review of systems:    As stated above in the chief complaint, otherwise negative. Medications:    Scheduled Meds:   lidocaine PF  5 mL Intradermal Once    heparin flush  3 mL Intravenous 2 times per day    metroNIDAZOLE  500 mg Oral 3 times per day    cefTRIAXone (ROCEPHIN) IV  2 g Intravenous Q24H    levothyroxine  25 mcg Oral Daily    pantoprazole  40 mg Oral QAM AC    sodium chloride flush  10 mL Intravenous 2 times per day    [Held by provider] enoxaparin  40 mg Subcutaneous Daily    insulin lispro  0-6 Units Subcutaneous TID WC     Continuous Infusions:   dextrose       PRN Meds:sodium chloride flush, heparin flush, sodium chloride flush, magnesium hydroxide, promethazine **OR** ondansetron, glucose, dextrose, glucagon (rDNA), dextrose, acetaminophen  Prior to Admission medications    Medication Sig Start Date End Date Taking?  Authorizing Provider   metroNIDAZOLE (FLAGYL) 500 MG tablet Take 1 tablet by mouth every 8 hours 7/3/20 8/14/20 Yes Laura Brown DO   cefTRIAXone (ROCEPHIN) infusion Infuse 2,000 mg intravenously every 24 hours Compound per protocol 7/3/20 8/14/20 Yes Laura Brown DO   glimepiride (AMARYL) 4 MG tablet Take 4 mg by mouth every morning (before breakfast)   Yes Historical Provider, MD   acetaminophen (TYLENOL) 325 MG tablet Take 650 mg by mouth every 6 hours as needed for Pain   Yes Historical Provider, MD   levothyroxine (SYNTHROID) 25 MCG tablet Take 1 tablet by mouth daily 6/26/20 Yes Lio ANUP Pilar, DO   Dulaglutide (TRULICITY) 2.39 W[place]/4.0EN SOPN Inject 0.75 mg into the skin once a week  Patient taking differently: Inject 0.75 mg into the skin once a week NEW MEDICINE AND IS PICKING UP TODAY. Has not administered yet 20  Yes Summer Velez, DO   atorvastatin (LIPITOR) 20 MG tablet Take 1 tablet by mouth daily  Patient taking differently: Take 20 mg by mouth nightly  20  Yes Lio Quezada, DO   fluticasone (FLONASE) 50 MCG/ACT nasal spray 2 sprays by Each Nostril route daily 20  Yes Lio ANUP Pilar, DO   pantoprazole (PROTONIX) 40 MG tablet Take 1 tablet by mouth every morning (before breakfast) 20  Yes Lio Quezada, DO   ibuprofen (ADVIL;MOTRIN) 600 MG tablet Take 1 tablet by mouth 3 times daily as needed for Pain 6/15/20  Yes Beto Peters MD   glimepiride (AMARYL) 4 MG tablet Take 1 tablet by mouth every morning (before breakfast) 20   Lio Godfrey,    Blood Glucose Monitoring Suppl (FREESTYLE FREEDOM LITE) w/Device KIT 1 kit by Does not apply route daily 3/4/20   Summer Velez, DO   FreeStyle Lancets MISC 1 each by Does not apply route daily 3/4/20   Lio Quezada, DO   blood glucose test strips (FREESTYLE LITE) strip 1 each by In Vitro route daily As needed. 3/4/20   Lio Godfrey DO       OBJECTIVE:  /87   Pulse 94   Temp 98.4 °F (36.9 °C) (Oral)   Resp 16   Ht 5' 1\" (1.549 m)   Wt 161 lb 11.2 oz (73.3 kg)   LMP 2020   SpO2 94%   BMI 30.55 kg/m²   Temp  Av.6 °F (37 °C)  Min: 98.4 °F (36.9 °C)  Max: 98.8 °F (37.1 °C)  General appearance: Resting in bed in no apparent distress. Skin: Warm and dry. No rashes were noted. HEENT: Round and reactive pupils. Moist mucous membranes. No ulcerations or thrush. Neck: Supple to movements. Chest: No use of accessory muscles to breathe. Symmetrical expansion. No wheezing, crackles or rhonchi. Cardiovascular: Reguar rate and rhythm.  No murmurs gallops, or rubs appreciated. Abdomen: Drain in place right upper quadrant with minimal drainage. Bowel sounds present, nontender, nondistended, no masses or hepatosplenomegaly. Extremities: No clubbing, no cyanosis, no edema. Lines: peripheral    I/O last 3 completed shifts: In: 5970 [P.O.:420; I.V.:1000]  Out: 10 [Drains:10]      Laboratory and Tests Review:      Lab Results   Component Value Date    WBC 14.1 (H) 07/04/2020    WBC 15.1 (H) 07/03/2020    WBC 13.2 (H) 07/03/2020    HGB 8.7 (L) 07/04/2020    HCT 26.4 (L) 07/04/2020    MCV 86.6 07/04/2020     07/04/2020     Lab Results   Component Value Date    NEUTROABS 11.61 (H) 07/04/2020    NEUTROABS 11.13 (H) 07/03/2020    NEUTROABS 4.96 07/02/2020     No results found for: [facility]  Lab Results   Component Value Date    ALT 27 07/04/2020    AST 15 07/04/2020     (H) 06/29/2020    ALKPHOS 286 (H) 07/04/2020    BILITOT 0.9 07/04/2020     Lab Results   Component Value Date     07/04/2020    K 3.6 07/04/2020     07/04/2020    CO2 19 07/04/2020    BUN 4 07/04/2020    CREATININE 0.4 07/04/2020    CREATININE 0.5 07/03/2020    CREATININE 0.5 07/02/2020    GFRAA >60 07/04/2020    LABGLOM >60 07/04/2020    GLUCOSE 224 07/04/2020    PROT 6.3 07/04/2020    LABALBU 2.4 07/04/2020    CALCIUM 7.8 07/04/2020    BILITOT 0.9 07/04/2020    ALKPHOS 286 07/04/2020    AST 15 07/04/2020    ALT 27 07/04/2020     Lab Results   Component Value Date    CRP 17.7 (H) 06/29/2020     Lab Results   Component Value Date    SEDRATE 111 (H) 06/29/2020       Radiology:    CT ABSCESS DRAINAGE W CATH PLACEMENT S&I   Final Result   Successful uncomplicated placement of a drainage catheter into an   intrahepatic fluid collection within the right lobe the liver. MRI ABDOMEN W WO CONTRAST   Final Result      1. Lobular, multiloculated lesion is located within posterior segment   right hepatic lobe.  This lesion shows no enhancement, however there is   increased signal on diffusion-weighted imaging suggestive of high   cellularity which would be associated with hepatic abscess. Carcinoma   is a lesser possibility. Clinical correlation recommended. Short-term   follow-up may be helpful for further evaluation. 2. No additional liver lesion evident. 3. No intrahepatic or extrahepatic bile duct dilatation. XR CHEST PORTABLE   Final Result      Status post placement of left IJ central venous catheter with distal   tip at level of right atrial/caval junction or right atrium. US ABDOMEN LIMITED   Final Result   Ill-defined liver lesion which is indeterminate. CT-guided core biopsy   is recommended               XR CHEST PORTABLE   Final Result   Normal chest               CT ABDOMEN PELVIS WO CONTRAST Additional Contrast? None   Final Result   Complex lesion within the right lobe of the liver. This is   indeterminant. Recommend follow-up postcontrast. Malignancy cannot be   excluded.          CT TUBE CATHETER FOLLOW UP    (Results Pending)       Microbiology:   Lab Results   Component Value Date    BC 24 Hours no growth 07/01/2020    BC  06/29/2020     Refer to previous CXBL2 culture collected from [facility] at 0942 on 6-29-20  for susceptibility results      ORG Klebsiella pneumoniae ssp pneumoniae 07/01/2020    ORG Klebsiella pneumoniae ssp pneumoniae 06/29/2020    ORG Klebsiella pneumoniae ssp pneumoniae 06/29/2020     Lab Results   Component Value Date    BLOODCULT2 24 Hours no growth 07/01/2020    ORG Klebsiella pneumoniae ssp pneumoniae 07/01/2020    ORG Klebsiella pneumoniae ssp pneumoniae 06/29/2020    ORG Klebsiella pneumoniae ssp pneumoniae 06/29/2020     No results found for: WNDABS  No results found for: RESPSMEAR  No results found for: MPNEUMO, CLAMYDCU, LABLEGI, AFBCX, FUNGSM, LABFUNG  No results found for: CULTRESP  No results found for: CXCATHTIP  Body Fluid Culture, Sterile   Date Value Ref Range Status   07/01/2020 Heavy growth  Final     No results found for:

## 2020-07-04 NOTE — PROGRESS NOTES
PROGRESS NOTE    Patient Presents with/Seen in Consultation For      *Reason for Consult: complex liver lesion     CHIEF COMPLAINT:  Chills, and lower abdominal pain    Subjective:     Patient seen laying in better, looking much better. State she is feeling better. No complaints at this time. Reports small loose stools each time she urinates. States she is having a PICC placed today for OP antibiotics. Denies any constipation. POC reviewed with the patient, all questions answered. Review of Systems  Aside from what was mentioned in the PMH and HPI, essentially unremarkable, all others negative. Objective:     Patient Vitals for the past 8 hrs:   BP Temp Temp src Pulse Resp SpO2   07/02/20 1709 128/79 99.5 °F (37.5 °C) Oral 98 16 97 %       General appearance: alert, in bed, and cooperative  Eyes: conjunctivae/corneas clear. PERRL.   Lungs: clear to auscultation bilaterally  Heart: regular rate and rhythm, no murmur, 2+ pulses;  without edema  Abdomen: softly distended, appropriate tenderness to palpitation, drain- RUQ, GILMER intact seroussangious drainage seen,  bowel sounds normal  Extremities: extremities without edema  Pulses: 2+ and symmetric  Skin: Skin color, texture, turgor normal.   Neurologic: Grossly normal    metroNIDAZOLE (FLAGYL) tablet 500 mg, 3 times per day  cefTRIAXone (ROCEPHIN) 2 g in sterile water 20 mL IV syringe, Q24H  0.9 % sodium chloride infusion, Continuous  levothyroxine (SYNTHROID) tablet 25 mcg, Daily  pantoprazole (PROTONIX) tablet 40 mg, QAM AC  sodium chloride flush 0.9 % injection 10 mL, 2 times per day  sodium chloride flush 0.9 % injection 10 mL, PRN  magnesium hydroxide (MILK OF MAGNESIA) 400 MG/5ML suspension 30 mL, Daily PRN  promethazine (PHENERGAN) tablet 12.5 mg, Q6H PRN    Or  ondansetron (ZOFRAN) injection 4 mg, Q6H PRN  [Held by provider] enoxaparin (LOVENOX) injection 40 mg, Daily  insulin lispro (HUMALOG) injection vial 0-6 Units, TID WC  glucose (GLUTOSE) 40 % oral gel 15 g, PRN  dextrose 50 % IV solution, PRN  glucagon (rDNA) injection 1 mg, PRN  dextrose 5 % solution, PRN  acetaminophen (TYLENOL) tablet 650 mg, Q6H PRN         Data Review  CBC:   Lab Results   Component Value Date    WBC 6.1 07/02/2020    RBC 4.73 07/02/2020    HGB 13.4 07/02/2020    HCT 41.1 07/02/2020    MCV 86.9 07/02/2020    MCH 28.3 07/02/2020    MCHC 32.6 07/02/2020    RDW 12.9 07/02/2020     07/02/2020    MPV 11.6 07/02/2020     CMP:    Lab Results   Component Value Date     07/02/2020    K 3.6 07/02/2020     07/02/2020    CO2 18 07/02/2020    BUN 7 07/02/2020    CREATININE 0.5 07/02/2020    GFRAA >60 07/02/2020    LABGLOM >60 07/02/2020    GLUCOSE 220 07/02/2020    PROT 6.7 07/02/2020    LABALBU 2.7 07/02/2020    CALCIUM 8.4 07/02/2020    BILITOT 1.0 07/02/2020    ALKPHOS 317 07/02/2020    AST 48 07/02/2020    ALT 52 07/02/2020     Hepatic Function Panel:    Lab Results   Component Value Date    ALKPHOS 317 07/02/2020    ALT 52 07/02/2020    AST 48 07/02/2020    PROT 6.7 07/02/2020    BILITOT 1.0 07/02/2020    BILIDIR 0.7 07/02/2020    IBILI 0.3 07/02/2020    LABALBU 2.7 07/02/2020     No components found for: [facility]  Lab Results   Component Value Date    TRIG 162 (H) 06/25/2020    TRIG 138 02/22/2020   e    LDLCALC 130 (H) 06/25/2020    LDLCALC 243 (H) 02/22/2020            L  Lab Results   Component Value Date    HDL 39 06/25/2020    HDL 61 02/22/2020   on  Lab Results   Component Value Date    LDLCALC 130 (H) 06/25/2020    LDLCALC 243 (H) 02/22/2020   ent Value Date    PROTIME 15.2 07/02/2020    INR 1.3 07/02/2020     IRON:    Lab Results   Component Value Date    IRON 14 06/29/2020     Iron Saturation:  No components found for: PERCENTFE  FERRITIN:    Lab Results   Component Value Date    FERRITIN 1,852 06/29/2020       Assessment:     Principal Problem:    · Liver lesion vs abscess   · Febrile with chills (103 PTA)  · Abdominal pain- LUQ & lower abdomen radiating into her back at times  · + BC- gram - rods; Klebsiella   · S/P carpal tunnel surgery   · GERD  · Nausea   · Hypotensive- resolved  · DM- defer  · Leukocytosis   · S/P image guided liver abscess drain insertion 7/1/20 with Dr. [facility]ORJA [facility]  · Cultures- +Klebsiella    Plan:       · Continue drain management as ordered  · Carb controlled diet, as tolerated   · Antibiotic management per ID services, Rocephin & Flagyl  · Medical management per Primary Care  · Continue Protonix PO daily  · OP follow up in 6-8 weeks  · OK to DC from GI POV; when OK with others    Discussed with Dr. Mayra Murry per Dr. Carlos Mcleod, NP-C 7/4/2020 9:57  AM For Dr. Christina Sarabia

## 2020-07-04 NOTE — PROGRESS NOTES
Messaged Dr. Glover Shove about GILMER drain,    I messaged, \"Would you be the correct physician to ask about if Linda's drain will be removed prior to discharge/ or the duration of how long she is to have the GILMER drain? Infectious disease said, \"Will need to check with IR regarding duration of drainage, likely will need repeat imaging prior to removal. \"    Dr. Krishna Perdue replied, \"She will have a follow up scan in 2 weeks, at which it might be removed. She can discharge with the drain in place. \"    I messaged, \"The attending would like to know whether or not it will be removed prior to D/C. Okay thank you\"    Dr. Krishna Perdue replied, \" Our nurses make the appts for the follow up scan. They may have already made the appointment. .they are off today, they will be back Monday. \"    I replied,\"shes okay to d/c from your standpoint, she should follow with you all in two weeks then? \"    Dr. Krishna Perdue replied, \"Yes. Antibiotics or not per ID. \"

## 2020-07-05 VITALS
HEIGHT: 61 IN | OXYGEN SATURATION: 95 % | HEART RATE: 91 BPM | TEMPERATURE: 98.6 F | SYSTOLIC BLOOD PRESSURE: 136 MMHG | DIASTOLIC BLOOD PRESSURE: 84 MMHG | WEIGHT: 163.5 LBS | RESPIRATION RATE: 18 BRPM | BODY MASS INDEX: 30.87 KG/M2

## 2020-07-05 LAB
ALBUMIN SERPL-MCNC: 2.2 G/DL (ref 3.5–5.2)
ALP BLD-CCNC: 288 U/L (ref 35–104)
ALT SERPL-CCNC: 21 U/L (ref 0–32)
AMMONIA: 27.1 UMOL/L (ref 11–51)
ANION GAP SERPL CALCULATED.3IONS-SCNC: 11 MMOL/L (ref 7–16)
AST SERPL-CCNC: 12 U/L (ref 0–31)
BASOPHILS ABSOLUTE: 0.05 E9/L (ref 0–0.2)
BASOPHILS RELATIVE PERCENT: 0.4 % (ref 0–2)
BILIRUB SERPL-MCNC: 0.8 MG/DL (ref 0–1.2)
BILIRUBIN DIRECT: 0.4 MG/DL (ref 0–0.3)
BILIRUBIN, INDIRECT: 0.4 MG/DL (ref 0–1)
BUN BLDV-MCNC: 4 MG/DL (ref 6–20)
CALCIUM SERPL-MCNC: 8.1 MG/DL (ref 8.6–10.2)
CHLORIDE BLD-SCNC: 101 MMOL/L (ref 98–107)
CO2: 22 MMOL/L (ref 22–29)
CREAT SERPL-MCNC: 0.5 MG/DL (ref 0.5–1)
EOSINOPHILS ABSOLUTE: 0.04 E9/L (ref 0.05–0.5)
EOSINOPHILS RELATIVE PERCENT: 0.4 % (ref 0–6)
GFR AFRICAN AMERICAN: >60
GFR NON-AFRICAN AMERICAN: >60 ML/MIN/1.73
GLUCOSE BLD-MCNC: 242 MG/DL (ref 74–99)
HCT VFR BLD CALC: 28 % (ref 34–48)
HEMOGLOBIN: 9.3 G/DL (ref 11.5–15.5)
IMMATURE GRANULOCYTES #: 0.23 E9/L
IMMATURE GRANULOCYTES %: 2.1 % (ref 0–5)
INR BLD: 1.3
LYMPHOCYTES ABSOLUTE: 1.79 E9/L (ref 1.5–4)
LYMPHOCYTES RELATIVE PERCENT: 16 % (ref 20–42)
MAGNESIUM: 1.9 MG/DL (ref 1.6–2.6)
MCH RBC QN AUTO: 28.9 PG (ref 26–35)
MCHC RBC AUTO-ENTMCNC: 33.2 % (ref 32–34.5)
MCV RBC AUTO: 87 FL (ref 80–99.9)
METER GLUCOSE: 225 MG/DL (ref 74–99)
METER GLUCOSE: 241 MG/DL (ref 74–99)
MONOCYTES ABSOLUTE: 0.85 E9/L (ref 0.1–0.95)
MONOCYTES RELATIVE PERCENT: 7.6 % (ref 2–12)
NEUTROPHILS ABSOLUTE: 8.25 E9/L (ref 1.8–7.3)
NEUTROPHILS RELATIVE PERCENT: 73.5 % (ref 43–80)
PDW BLD-RTO: 13.4 FL (ref 11.5–15)
PLATELET # BLD: 265 E9/L (ref 130–450)
PMV BLD AUTO: 10.8 FL (ref 7–12)
POTASSIUM REFLEX MAGNESIUM: 3.5 MMOL/L (ref 3.5–5)
PROTHROMBIN TIME: 15.6 SEC (ref 9.3–12.4)
RBC # BLD: 3.22 E12/L (ref 3.5–5.5)
SODIUM BLD-SCNC: 134 MMOL/L (ref 132–146)
TOTAL PROTEIN: 6.7 G/DL (ref 6.4–8.3)
WBC # BLD: 11.2 E9/L (ref 4.5–11.5)

## 2020-07-05 PROCEDURE — 80076 HEPATIC FUNCTION PANEL: CPT

## 2020-07-05 PROCEDURE — 6370000000 HC RX 637 (ALT 250 FOR IP): Performed by: INTERNAL MEDICINE

## 2020-07-05 PROCEDURE — 83735 ASSAY OF MAGNESIUM: CPT

## 2020-07-05 PROCEDURE — 85025 COMPLETE CBC W/AUTO DIFF WBC: CPT

## 2020-07-05 PROCEDURE — 99239 HOSP IP/OBS DSCHRG MGMT >30: CPT | Performed by: INTERNAL MEDICINE

## 2020-07-05 PROCEDURE — 80048 BASIC METABOLIC PNL TOTAL CA: CPT

## 2020-07-05 PROCEDURE — 36415 COLL VENOUS BLD VENIPUNCTURE: CPT

## 2020-07-05 PROCEDURE — 2580000003 HC RX 258: Performed by: INTERNAL MEDICINE

## 2020-07-05 PROCEDURE — 6360000002 HC RX W HCPCS: Performed by: INTERNAL MEDICINE

## 2020-07-05 PROCEDURE — 82962 GLUCOSE BLOOD TEST: CPT

## 2020-07-05 PROCEDURE — 85610 PROTHROMBIN TIME: CPT

## 2020-07-05 PROCEDURE — 82140 ASSAY OF AMMONIA: CPT

## 2020-07-05 RX ORDER — POTASSIUM CHLORIDE 20 MEQ/1
20 TABLET, EXTENDED RELEASE ORAL DAILY
Qty: 30 TABLET | Refills: 1 | Status: SHIPPED | OUTPATIENT
Start: 2020-07-05 | End: 2021-04-02

## 2020-07-05 RX ORDER — POTASSIUM CHLORIDE 20 MEQ/1
20 TABLET, EXTENDED RELEASE ORAL DAILY
Qty: 60 TABLET | Refills: 1 | Status: SHIPPED | OUTPATIENT
Start: 2020-07-05 | End: 2020-07-05 | Stop reason: SDUPTHER

## 2020-07-05 RX ORDER — POTASSIUM CHLORIDE 20 MEQ/1
40 TABLET, EXTENDED RELEASE ORAL ONCE
Status: COMPLETED | OUTPATIENT
Start: 2020-07-05 | End: 2020-07-05

## 2020-07-05 RX ADMIN — Medication 300 UNITS: at 09:02

## 2020-07-05 RX ADMIN — POTASSIUM CHLORIDE 40 MEQ: 20 TABLET, EXTENDED RELEASE ORAL at 09:01

## 2020-07-05 RX ADMIN — WATER 2 G: 1 INJECTION INTRAMUSCULAR; INTRAVENOUS; SUBCUTANEOUS at 13:38

## 2020-07-05 RX ADMIN — METRONIDAZOLE 500 MG: 500 TABLET, FILM COATED ORAL at 13:38

## 2020-07-05 RX ADMIN — SODIUM CHLORIDE, PRESERVATIVE FREE 10 ML: 5 INJECTION INTRAVENOUS at 09:02

## 2020-07-05 RX ADMIN — LEVOTHYROXINE SODIUM 25 MCG: 25 TABLET ORAL at 06:10

## 2020-07-05 RX ADMIN — PANTOPRAZOLE SODIUM 40 MG: 40 TABLET, DELAYED RELEASE ORAL at 06:10

## 2020-07-05 RX ADMIN — INSULIN LISPRO 2 UNITS: 100 INJECTION, SOLUTION INTRAVENOUS; SUBCUTANEOUS at 11:28

## 2020-07-05 RX ADMIN — INSULIN LISPRO 2 UNITS: 100 INJECTION, SOLUTION INTRAVENOUS; SUBCUTANEOUS at 06:10

## 2020-07-05 RX ADMIN — ONDANSETRON 4 MG: 2 INJECTION INTRAMUSCULAR; INTRAVENOUS at 11:44

## 2020-07-05 RX ADMIN — METRONIDAZOLE 500 MG: 500 TABLET, FILM COATED ORAL at 06:10

## 2020-07-05 ASSESSMENT — PAIN SCALES - GENERAL: PAINLEVEL_OUTOF10: 0

## 2020-07-05 NOTE — DISCHARGE SUMMARY
[facility] Physician Discharge Summary       DO Jalen De Los Santos [address] [place] [address]  [phone]    Call in 1 day  follow up appointment    [address] [facility] [address] [place] [address] Drive    tube check on 7- at [address], [address] [facility] 80  [facility] [phone]    Call in 1 day  follow up appointment    Willam Alvarado MD  [address] [facility]  [phone]    Call in 1 day  follow up appointment      Activity level: As tolerated. Dispo: To home. Condition on discharge: Stable. Patient ID:  Peyman Norris  [id number]  35 y.o.  1980    Admit date: 6/29/2020    Discharge date and time:  7/5/2020  11:21 AM    Admission Diagnoses: Principal Problem:    Sepsis ([address]  Active Problems:    Type 2 diabetes mellitus without complication, without long-term current use of insulin (HCC)    Abdominal pain    Liver lesion    SIRS (systemic inflammatory response syndrome) ([address]  Resolved Problems:    * No resolved hospital problems. *      Discharge Diagnoses: Principal Problem:    Sepsis ([address]  Active Problems:    Type 2 diabetes mellitus without complication, without long-term current use of insulin (HCC)    Abdominal pain    Liver lesion    SIRS (systemic inflammatory response syndrome) ([address]  Resolved Problems:    * No resolved hospital problems. *      Consults:  IP CONSULT TO CRITICAL CARE  IP CONSULT TO INFECTIOUS DISEASES  IP CONSULT TO INFECTIOUS DISEASES  IP CONSULT TO GI  IP CONSULT TO IV TEAM  IP CONSULT TO IV TEAM  IP CONSULT TO [address] Course:   Patient Peyman Norris is a 35 y.o. presented with Sepsis ([address] [A41.9]  Sepsis ([address] [A41.9]    The patient presented to the hospital with a 6-day history of diffuse abdominal pain, nausea, and fevers. In the ED, the patient had a fever of 103 F.   Abdominal CT was performed which revealed a complex liver lesion. She also had a lactic acid of 4.3 and was hypotensive. She was admitted to the ICU for further evaluation. The patient was started on Zosyn and infectious disease was consulted. She was also started on IV fluids. Her blood pressures improved, and she was transferred out of the ICU. To get a better view of the liver lesion, an MRI was performed. A triphasic CTA was not attempted as the patient has anaphylaxis with iodine. The MRI revealed that the lesion was likely a liver abscess and IR was consulted. GI was also consulted. The patient's antibiotics were changed to Unasyn. The patient underwent IR drain placement. The patient's blood cultures were positive for Klebsiella and abscess drainage was also positive for Klebsiella. The patient's antibiotics were changed to Rocephin and Flagyl. The patient had good pain control with decreasing drainage. She was tolerating a diet and was hemodynamically stable. A PICC line was placed. The patient was approved for discharge by all services and will go home with the drain. She will require antibiotics for 6 weeks. Discharge Exam:    Gen: NAD, AAOx3, sitting in bed  HEENT: NCAT  CV: Tachycardia, no m/r/g  Resp: Equal chest rise b/l, CTAB  Abd: Soft, mild R flank tenderness to palpation surrounding IR drain, GILMER with serous output, ND  Ext: Good ROM of b/l upper and lower extremities, 1+ BLE edema    I/O last 3 completed shifts:   In: 13 [I.V.:13]  Out: 30 [Drains:30]  I/O this shift:  In: -   Out: 10 [Drains:10]      LABS:  Recent Labs     07/03/20  0410 07/04/20  0340 07/05/20  0340    135 134   K 3.3* 3.6 3.5    104 101   CO2 18* 19* 22   BUN 4* 4* 4*   CREATININE 0.5 0.4* 0.5   GLUCOSE 222* 224* 242*   CALCIUM 8.3* 7.8* 8.1*       Recent Labs     07/03/20  0900 07/04/20  0340 07/05/20  0340   WBC 15.1* 14.1* 11.2   RBC 3.21* 3.05* 3.22*   HGB 9.2* 8.7* 9.3*   HCT 28.1* 26.4* 28.0*   MCV 87.5 86.6 87.0   MCH 28.7 28.5 28.9   MCHC 32.7 33.0 33.2   RDW 13.2 13.4 13.4    204 265   MPV 11.5 11.0 10.8       No results for input(s): POCGLU in the last 72 hours. Imaging:  Ct Abdomen Pelvis Wo Contrast Additional Contrast? None    Result Date: 2020  Patient MRN:  [id number] : 1980 Age: 35 years Gender: Female Order Date:  2020 9:30 AM EXAM: CT ABDOMEN PELVIS WO CONTRAST Technique: Low-dose CT  acquisition technique included one of following options; 1 . Automated exposure control, 2. Adjustment of MA and or KV according to patient's size. 3. Use of interactive reconstruction. Dosage: 651 mGy-cm. INDICATION:  abd pain abd pain COMPARISON: None FINDINGS:  There is a vague low-attenuation zone in the posterior segment of the right lobe the liver with a curvilinear soft tissue component. The lesion measures at least 5.1 cm. There may be an additional lesion caudally in the posterior segment. Pancreas, spleen and kidneys are normal. No abdominal or pelvic lymphadenopathy or fluid collections are noted. Bowel loops are normal. Bladder is unremarkable     Complex lesion within the right lobe of the liver. This is indeterminant. Recommend follow-up postcontrast. Malignancy cannot be excluded. Mri Abdomen W Wo Contrast    Result Date: 2020  Patient MRN:  [id number] : 1980 Age: 35 years Gender: Female Order Date:  2020 2:15 PM EXAM: MRI ABDOMEN W WO CONTRAST COMPARISON: Correlation made with CT of the abdomen and pelvis from 2020 INDICATION:  Evaluate liver lesion (patient had anaphylactic shock with iodine in past so trying to avoid triphasic CT) Evaluate liver lesion (patient had anaphylactic shock with iodine in past so trying to avoid triphasic CT) FINDINGS: 6 mm if this utilized. There is a lobular, multiloculated lesion located within the posterior segment of the right hepatic lobe which measures approximately  5 x 2.8 cm.  Multiple subcentimeter lesions are seen adjacent to the margins of this lesion. This lesion shows no significant enhancement. This lesion is intermediate in intensity on T2 weighted imaging. Posterior margin of the lesion is partially obscured. This lesion shows increased signal on diffusion-weighted imaging. Apparent diffusion coefficient mapping is not available. No additional lesion evident. There is no intrahepatic or extra hepatic bile duct dilatation. Common bile duct is nondilated. The gallbladder is grossly unremarkable. No evidence of pancreatic mass. No acute pancreatitis. Spleen is nonenlarged. 1. Lobular, multiloculated lesion is located within posterior segment right hepatic lobe. This lesion shows no enhancement, however there is increased signal on diffusion-weighted imaging suggestive of high cellularity which would be associated with hepatic abscess. Carcinoma is a lesser possibility. Clinical correlation recommended. Short-term follow-up may be helpful for further evaluation. 2. No additional liver lesion evident. 3. No intrahepatic or extrahepatic bile duct dilatation. Xr Chest Portable    Result Date: 2020  Patient MRN:  [id number] : 1980 Age: 35 years Gender: Female Order Date:  2020 3:30 PM EXAM: XR CHEST PORTABLE COMPARISON: Today at 1150hours INDICATION:  cvl placement cvl placement FINDINGS: Status post placement of left IJ central venous catheter with distal tip at the level of atrial/caval junction or right atrium. No pneumothorax. No airspace opacity or pleural effusion. The heart is normal in size. Status post placement of left IJ central venous catheter with distal tip at level of right atrial/caval junction or right atrium.     Xr Chest Portable    Result Date: 2020  Patient MRN:  [id number] : 1980 Age: 35 years Gender: Female Order Date:  2020 11:15 AM EXAM: XR CHEST PORTABLE INDICATION:  fever/lactic elev fever/lactic elev COMPARISON: 2020 FINDINGS:  Heart size is normal. There are no infiltrates or effusions. Normal chest     Us Abdomen Limited    Result Date: 2020  Patient MRN:  [id number] : 1980 Age: 35 years Gender: Female Order Date:  2020 1:00 PM EXAM: US ABDOMEN LIMITED INDICATION:  liver lesion, r/o gallstones liver lesion, r/o gallstones COMPARISON: CT same date FINDINGS:  In the posterior segment of the right lobe, there is a heterogeneous lesion measuring 6.4 x 5.1 cm. It has a central hypoechoic region and a peripheral more soft tissue component. There is no increased vascular flow in the central portion. No other liver lesions are noted. Common bile duct is normal measuring 2.7 cm. Gallbladder is normal. Right kidney is unremarkable. Ill-defined liver lesion which is indeterminate. CT-guided core biopsy is recommended       Patient Instructions:      Medication List      START taking these medications    cefTRIAXone  infusion  Commonly known as:  ROCEPHIN  Infuse 2,000 mg intravenously every 24 hours Compound per protocol     metroNIDAZOLE 500 MG tablet  Commonly known as:  FLAGYL  Take 1 tablet by mouth every 8 hours     potassium chloride 20 MEQ extended release tablet  Commonly known as:  KLOR-CON M  Take 1 tablet by mouth daily        CHANGE how you take these medications    atorvastatin 20 MG tablet  Commonly known as:  LIPITOR  Take 1 tablet by mouth daily  What changed:  when to take this     Trulicity 5.20 EA/5.8LJ Sopn  Generic drug:  Dulaglutide  Inject 0.75 mg into the skin once a week  What changed:  additional instructions        CONTINUE taking these medications    blood glucose test strips strip  Commonly known as:  FREESTYLE LITE  1 each by In Vitro route daily As needed.      fluticasone 50 MCG/ACT nasal spray  Commonly known as:  FLONASE  2 sprays by Each Nostril route daily     FreeStyle Freedom Lite w/Device Kit  1 kit by Does not apply route daily     FreeStyle Lancets Misc  1 each by Does not apply route daily     * glimepiride 4 MG tablet  Commonly known as:  AMARYL     * glimepiride 4 MG tablet  Commonly known as:  AMARYL  Take 1 tablet by mouth every morning (before breakfast)     ibuprofen 600 MG tablet  Commonly known as:  ADVIL;MOTRIN  Take 1 tablet by mouth 3 times daily as needed for Pain     levothyroxine 25 MCG tablet  Commonly known as:  SYNTHROID  Take 1 tablet by mouth daily     pantoprazole 40 MG tablet  Commonly known as:  PROTONIX  Take 1 tablet by mouth every morning (before breakfast)         * This list has 2 medication(s) that are the same as other medications prescribed for you. Read the directions carefully, and ask your doctor or other care provider to review them with you.             STOP taking these medications    acetaminophen 325 MG tablet  Commonly known as:  TYLENOL           Where to Get Your Medications      You can get these medications from any pharmacy    Bring a paper prescription for each of these medications  · cefTRIAXone  infusion  · metroNIDAZOLE 500 MG tablet  · potassium chloride 20 MEQ extended release tablet           Note that more than 30 minutes was spent in preparing discharge papers, discussing discharge with patient, medication review, etc.    Signed:  Electronically signed by Mili Barfield MD on 7/5/2020 at 11:21 AM

## 2020-07-05 NOTE — PLAN OF CARE
Problem: Safety:  Goal: Free from accidental physical injury  Description: Free from accidental physical injury  Outcome: Met This Shift     Problem: Safety:  Goal: Free from intentional harm  Description: Free from intentional harm  Outcome: Met This Shift     Problem: Falls - Risk of:  Goal: Will remain free from falls  Description: Will remain free from falls  Outcome: Met This Shift     Problem: Falls - Risk of:  Goal: Absence of physical injury  Description: Absence of physical injury  Outcome: Met This Shift

## 2020-07-05 NOTE — PROGRESS NOTES
[phone].

## 2020-07-05 NOTE — PROGRESS NOTES
[address] [place] [address] [facility]  Progress Note      Chief Complaint   Patient presents with    Abdominal Pain     Lower abdominal pain x six days, EMS states she was having an anxiety attack. Patient states she is not currently having pain or anxiety    Fever     103.0 during triage, patient states chills. SUBJECTIVE:      Patient is tolerating medications. No reported adverse drug reactions. No problems overnight. Anxious about duration of antibiotic therapy potential for liver and kidney toxicity      Review of systems:    As stated above in the chief complaint, otherwise negative. Medications:    Scheduled Meds:   heparin flush  3 mL Intravenous 2 times per day    metroNIDAZOLE  500 mg Oral 3 times per day    cefTRIAXone (ROCEPHIN) IV  2 g Intravenous Q24H    levothyroxine  25 mcg Oral Daily    pantoprazole  40 mg Oral QAM AC    sodium chloride flush  10 mL Intravenous 2 times per day    [Held by provider] enoxaparin  40 mg Subcutaneous Daily    insulin lispro  0-6 Units Subcutaneous TID WC     Continuous Infusions:   dextrose       PRN Meds:sodium chloride flush, heparin flush, sodium chloride flush, magnesium hydroxide, promethazine **OR** ondansetron, glucose, dextrose, glucagon (rDNA), dextrose, acetaminophen  Prior to Admission medications    Medication Sig Start Date End Date Taking?  Authorizing Provider   potassium chloride (KLOR-CON M) 20 MEQ extended release tablet Take 1 tablet by mouth daily 7/5/20  Yes Roni Middleton MD   metroNIDAZOLE (FLAGYL) 500 MG tablet Take 1 tablet by mouth every 8 hours 7/3/20 8/14/20 Yes Mauricio Yu DO   cefTRIAXone (ROCEPHIN) infusion Infuse 2,000 mg intravenously every 24 hours Compound per protocol 7/3/20 8/14/20 Yes Mauricio Yu DO   glimepiride (AMARYL) 4 MG tablet Take 4 mg by mouth every morning (before breakfast)   Yes Historical Provider, MD   levothyroxine (SYNTHROID) 25 MCG tablet Take 1 tablet by mouth appreciated. Abdomen: Drain in place right upper quadrant with minimal serosanguineous fluid. Bowel sounds present, nontender, nondistended, no masses or hepatosplenomegaly. Extremities: No clubbing, no cyanosis, no edema. Lines: peripheral    I/O last 3 completed shifts: In: 13 [I.V.:13]  Out: 30 [Drains:30]      Laboratory and Tests Review:      Lab Results   Component Value Date    WBC 11.2 07/05/2020    WBC 14.1 (H) 07/04/2020    WBC 15.1 (H) 07/03/2020    HGB 9.3 (L) 07/05/2020    HCT 28.0 (L) 07/05/2020    MCV 87.0 07/05/2020     07/05/2020     Lab Results   Component Value Date    NEUTROABS 8.25 (H) 07/05/2020    NEUTROABS 11.61 (H) 07/04/2020    NEUTROABS 11.13 (H) 07/03/2020     No results found for: [facility]  Lab Results   Component Value Date    ALT 21 07/05/2020    AST 12 07/05/2020     (H) 06/29/2020    ALKPHOS 288 (H) 07/05/2020    BILITOT 0.8 07/05/2020     Lab Results   Component Value Date     07/05/2020    K 3.5 07/05/2020     07/05/2020    CO2 22 07/05/2020    BUN 4 07/05/2020    CREATININE 0.5 07/05/2020    CREATININE 0.4 07/04/2020    CREATININE 0.5 07/03/2020    GFRAA >60 07/05/2020    LABGLOM >60 07/05/2020    GLUCOSE 242 07/05/2020    PROT 6.7 07/05/2020    LABALBU 2.2 07/05/2020    CALCIUM 8.1 07/05/2020    BILITOT 0.8 07/05/2020    ALKPHOS 288 07/05/2020    AST 12 07/05/2020    ALT 21 07/05/2020     Lab Results   Component Value Date    CRP 17.7 (H) 06/29/2020     Lab Results   Component Value Date    SEDRATE 111 (H) 06/29/2020       Radiology:    CT ABSCESS DRAINAGE W CATH PLACEMENT S&I   Final Result   Successful uncomplicated placement of a drainage catheter into an   intrahepatic fluid collection within the right lobe the liver. MRI ABDOMEN W WO CONTRAST   Final Result      1. Lobular, multiloculated lesion is located within posterior segment   right hepatic lobe.  This lesion shows no enhancement, however there is   increased signal on diffusion-weighted imaging suggestive of high   cellularity which would be associated with hepatic abscess. Carcinoma   is a lesser possibility. Clinical correlation recommended. Short-term   follow-up may be helpful for further evaluation. 2. No additional liver lesion evident. 3. No intrahepatic or extrahepatic bile duct dilatation. XR CHEST PORTABLE   Final Result      Status post placement of left IJ central venous catheter with distal   tip at level of right atrial/caval junction or right atrium. US ABDOMEN LIMITED   Final Result   Ill-defined liver lesion which is indeterminate. CT-guided core biopsy   is recommended               XR CHEST PORTABLE   Final Result   Normal chest               CT ABDOMEN PELVIS WO CONTRAST Additional Contrast? None   Final Result   Complex lesion within the right lobe of the liver. This is   indeterminant. Recommend follow-up postcontrast. Malignancy cannot be   excluded.          CT TUBE CATHETER FOLLOW UP    (Results Pending)       Microbiology:   Lab Results   Component Value Date    BC 24 Hours no growth 07/01/2020    BC  06/29/2020     Refer to previous CXBL2 culture collected from [facility] at 0942 on 6-29-20  for susceptibility results      ORG Klebsiella pneumoniae ssp pneumoniae 07/01/2020    ORG Klebsiella pneumoniae ssp pneumoniae 06/29/2020    ORG Klebsiella pneumoniae ssp pneumoniae 06/29/2020     Lab Results   Component Value Date    BLOODCULT2 24 Hours no growth 07/01/2020    ORG Klebsiella pneumoniae ssp pneumoniae 07/01/2020    ORG Klebsiella pneumoniae ssp pneumoniae 06/29/2020    ORG Klebsiella pneumoniae ssp pneumoniae 06/29/2020     No results found for: WNDABS  No results found for: RESPSMEAR  No results found for: MPNEUMO, CLAMYDCU, LABLEGI, AFBCX, FUNGSM, LABFUNG  No results found for: CULTRESP  No results found for: CXCATHTIP  Body Fluid Culture, Sterile   Date Value Ref Range Status   07/01/2020 Heavy growth  Final     No results found for: CXSURG  Urine Culture, Routine   Date Value Ref Range Status   06/29/2020   Final    10 to 100,000 CFU/mL  Mixed sharmin isolated. Further workup and sensitivity testing  is not routinely indicated and will not be performed. Mixed sharmin isolated includes:  Mixed gram positive organisms       MRSA Culture Only   Date Value Ref Range Status   06/29/2020 Methicillin resistant Staph aureus not isolated  Final       Problem list:    Principal Problem:    Sepsis ([facility]  Active Problems:    Type 2 diabetes mellitus without complication, without long-term current use of insulin (HCC)    Abdominal pain    Liver lesion    SIRS (systemic inflammatory response syndrome) ([facility]  Resolved Problems:    * No resolved hospital problems. *      ASSESSMENT:    · Pyogenic liver abscess-Klebsiella  · Klebsiella bacteremia secondary liver abscess       PLAN:    Continue ceftriaxone, day 5 of effective therapy    Continue metronidazole     Stable for discharge from ID perspective    Will need follow-up with IR next week and with ID in 2-3 weeks    Joanne Plaza    10:55 AM  7/5/2020      Pt seen and examined. Above discussed agree. Labs, cultures, and radiographs reviewed. Face to Face encounter occurred. Changes made as necessary.      Jen Lopez MD

## 2020-07-05 NOTE — HOME CARE
[facility] following for final DC plans. Ceftriaxone to be supplied through [facility], covered at 100%. Will need HHC orders and IV ATB script faxed to [phone].    [facility]

## 2020-07-06 LAB
BLOOD CULTURE, ROUTINE: NORMAL
CULTURE, BLOOD 2: NORMAL
Lab: NORMAL
REPORT: NORMAL
THIS TEST SENT TO: NORMAL

## 2020-07-08 ENCOUNTER — TELEPHONE (OUTPATIENT)
Dept: PRIMARY CARE CLINIC | Age: 40
End: 2020-07-08

## 2020-07-08 NOTE — TELEPHONE ENCOUNTER
[facility] Transitions Initial Follow Up Call    Outreach made within 2 business days of discharge: Yes    Patient: [address] [facility] Patient : 1980   MRN: [id number]  Reason for Admission: There are no discharge diagnoses documented for the most recent discharge. Discharge Date: 20       Spoke with: Luly [facility]    Discharge department/facility: [facility] Interactive Patient Contact:  Was patient able to fill all prescriptions: Yes  Was patient instructed to bring all medications to the follow-up visit: Yes  Is patient taking all medications as directed in the discharge summary? Yes  Does patient understand their discharge instructions: Yes  Does patient have questions or concerns that need addressed prior to 7-14 day follow up office visit: yes - Patient is concerned about their Catheter. Pt advised that that Home Health Nurse refused to flush due to there not being an order in from the Doctor. She has grown more concerned as her Catheter has since stopped dripping patient advised that nothing is coming out of her catheter now. Advised to Rout to Doctor.        Scheduled appointment with PCP within 7-14 days    Follow Up  Future Appointments   Date Time Provider Souleymane Lino   7/10/2020 10:30 AM DO NEOM [facility]re[facility]   2020  9:10 AM Nhi Pyle MD BDM ORTHO [facility]   2020 11:00 AM SEB CT 1-BD SEBZ CT SEB Radiolog   2020  1:00 PM Peyton Plata APRN - CNP [facility]BD [facility]   2020  9:30 AM DO NEMO Contreras [facility]       [place], [address] [place]

## 2020-07-08 NOTE — TELEPHONE ENCOUNTER
Called pt and advised them to contact the surgeon. Advised the contact info may be on their discharge paper work or to call the hospital and see if they can provide her with contact info for the surgeon.

## 2020-07-09 ENCOUNTER — HOSPITAL ENCOUNTER (OUTPATIENT)
Age: 40
Discharge: HOME | End: 2020-07-11
Payer: MEDICARE

## 2020-07-09 ENCOUNTER — TELEPHONE (OUTPATIENT)
Dept: ADMINISTRATIVE | Age: 40
End: 2020-07-09

## 2020-07-09 LAB
ALBUMIN SERPL-MCNC: 2.9 G/DL (ref 3.5–5.2)
ALP BLD-CCNC: 260 U/L (ref 35–104)
ALT SERPL-CCNC: 7 U/L (ref 0–32)
ANION GAP SERPL CALCULATED.3IONS-SCNC: 14 MMOL/L (ref 7–16)
AST SERPL-CCNC: 13 U/L (ref 0–31)
BASOPHILS ABSOLUTE: 0.05 E9/L (ref 0–0.2)
BASOPHILS RELATIVE PERCENT: 0.4 % (ref 0–2)
BILIRUB SERPL-MCNC: 0.4 MG/DL (ref 0–1.2)
BUN BLDV-MCNC: 4 MG/DL (ref 6–20)
C-REACTIVE PROTEIN: 6.2 MG/DL (ref 0–0.4)
CALCIUM SERPL-MCNC: 8.7 MG/DL (ref 8.6–10.2)
CHLORIDE BLD-SCNC: 95 MMOL/L (ref 98–107)
CO2: 24 MMOL/L (ref 22–29)
CREAT SERPL-MCNC: 0.4 MG/DL (ref 0.5–1)
EOSINOPHILS ABSOLUTE: 0.06 E9/L (ref 0.05–0.5)
EOSINOPHILS RELATIVE PERCENT: 0.5 % (ref 0–6)
GFR AFRICAN AMERICAN: >60
GFR NON-AFRICAN AMERICAN: >60 ML/MIN/1.73
GLUCOSE BLD-MCNC: 300 MG/DL (ref 74–99)
HCT VFR BLD CALC: 32.7 % (ref 34–48)
HEMOGLOBIN: 10.3 G/DL (ref 11.5–15.5)
IMMATURE GRANULOCYTES #: 0.14 E9/L
IMMATURE GRANULOCYTES %: 1.1 % (ref 0–5)
LYMPHOCYTES ABSOLUTE: 2.17 E9/L (ref 1.5–4)
LYMPHOCYTES RELATIVE PERCENT: 16.7 % (ref 20–42)
MCH RBC QN AUTO: 28.1 PG (ref 26–35)
MCHC RBC AUTO-ENTMCNC: 31.5 % (ref 32–34.5)
MCV RBC AUTO: 89.1 FL (ref 80–99.9)
MONOCYTES ABSOLUTE: 0.96 E9/L (ref 0.1–0.95)
MONOCYTES RELATIVE PERCENT: 7.4 % (ref 2–12)
NEUTROPHILS ABSOLUTE: 9.65 E9/L (ref 1.8–7.3)
NEUTROPHILS RELATIVE PERCENT: 73.9 % (ref 43–80)
PDW BLD-RTO: 14.1 FL (ref 11.5–15)
PLATELET # BLD: 412 E9/L (ref 130–450)
PMV BLD AUTO: 10.9 FL (ref 7–12)
POTASSIUM SERPL-SCNC: 3.8 MMOL/L (ref 3.5–5)
RBC # BLD: 3.67 E12/L (ref 3.5–5.5)
SEDIMENTATION RATE, ERYTHROCYTE: 123 MM/HR (ref 0–20)
SODIUM BLD-SCNC: 133 MMOL/L (ref 132–146)
TOTAL PROTEIN: 7.4 G/DL (ref 6.4–8.3)
WBC # BLD: 13 E9/L (ref 4.5–11.5)

## 2020-07-09 PROCEDURE — 86140 C-REACTIVE PROTEIN: CPT

## 2020-07-09 PROCEDURE — 80053 COMPREHEN METABOLIC PANEL: CPT

## 2020-07-09 PROCEDURE — 85651 RBC SED RATE NONAUTOMATED: CPT

## 2020-07-09 PROCEDURE — 85025 COMPLETE CBC W/AUTO DIFF WBC: CPT

## 2020-07-09 PROCEDURE — 36415 COLL VENOUS BLD VENIPUNCTURE: CPT

## 2020-07-13 ENCOUNTER — HOSPITAL ENCOUNTER (OUTPATIENT)
Age: 40
Discharge: HOME | End: 2020-07-15
Payer: MEDICARE

## 2020-07-13 LAB
ALBUMIN SERPL-MCNC: 3.2 G/DL (ref 3.5–5.2)
ALP BLD-CCNC: 173 U/L (ref 35–104)
ALT SERPL-CCNC: 6 U/L (ref 0–32)
ANION GAP SERPL CALCULATED.3IONS-SCNC: 11 MMOL/L (ref 7–16)
AST SERPL-CCNC: 12 U/L (ref 0–31)
BASOPHILS ABSOLUTE: 0.07 E9/L (ref 0–0.2)
BASOPHILS RELATIVE PERCENT: 1 % (ref 0–2)
BILIRUB SERPL-MCNC: 0.3 MG/DL (ref 0–1.2)
BUN BLDV-MCNC: 7 MG/DL (ref 6–20)
C-REACTIVE PROTEIN: 1.5 MG/DL (ref 0–0.4)
CALCIUM SERPL-MCNC: 8.8 MG/DL (ref 8.6–10.2)
CHLORIDE BLD-SCNC: 96 MMOL/L (ref 98–107)
CO2: 25 MMOL/L (ref 22–29)
CREAT SERPL-MCNC: 0.4 MG/DL (ref 0.5–1)
EOSINOPHILS ABSOLUTE: 0.11 E9/L (ref 0.05–0.5)
EOSINOPHILS RELATIVE PERCENT: 1.6 % (ref 0–6)
GFR AFRICAN AMERICAN: >60
GFR NON-AFRICAN AMERICAN: >60 ML/MIN/1.73
GLUCOSE BLD-MCNC: 196 MG/DL (ref 74–99)
HCT VFR BLD CALC: 36.6 % (ref 34–48)
HEMOGLOBIN: 11 G/DL (ref 11.5–15.5)
IMMATURE GRANULOCYTES #: 0.05 E9/L
IMMATURE GRANULOCYTES %: 0.7 % (ref 0–5)
LYMPHOCYTES ABSOLUTE: 2.07 E9/L (ref 1.5–4)
LYMPHOCYTES RELATIVE PERCENT: 29.9 % (ref 20–42)
MCH RBC QN AUTO: 27.1 PG (ref 26–35)
MCHC RBC AUTO-ENTMCNC: 30.1 % (ref 32–34.5)
MCV RBC AUTO: 90.1 FL (ref 80–99.9)
MONOCYTES ABSOLUTE: 0.62 E9/L (ref 0.1–0.95)
MONOCYTES RELATIVE PERCENT: 9 % (ref 2–12)
NEUTROPHILS ABSOLUTE: 4 E9/L (ref 1.8–7.3)
NEUTROPHILS RELATIVE PERCENT: 57.8 % (ref 43–80)
PDW BLD-RTO: 13.9 FL (ref 11.5–15)
PLATELET # BLD: 466 E9/L (ref 130–450)
PMV BLD AUTO: 10.7 FL (ref 7–12)
POTASSIUM SERPL-SCNC: 3.8 MMOL/L (ref 3.5–5)
RBC # BLD: 4.06 E12/L (ref 3.5–5.5)
SEDIMENTATION RATE, ERYTHROCYTE: 120 MM/HR (ref 0–20)
SODIUM BLD-SCNC: 132 MMOL/L (ref 132–146)
TOTAL PROTEIN: 8 G/DL (ref 6.4–8.3)
WBC # BLD: 6.9 E9/L (ref 4.5–11.5)

## 2020-07-13 PROCEDURE — 86140 C-REACTIVE PROTEIN: CPT

## 2020-07-13 PROCEDURE — 36415 COLL VENOUS BLD VENIPUNCTURE: CPT

## 2020-07-13 PROCEDURE — 80053 COMPREHEN METABOLIC PANEL: CPT

## 2020-07-13 PROCEDURE — 85651 RBC SED RATE NONAUTOMATED: CPT

## 2020-07-13 PROCEDURE — 85025 COMPLETE CBC W/AUTO DIFF WBC: CPT

## 2020-07-16 ENCOUNTER — HOSPITAL ENCOUNTER (OUTPATIENT)
Dept: CT IMAGING | Age: 40
Discharge: HOME | End: 2020-07-18
Payer: MEDICARE

## 2020-07-16 ENCOUNTER — HOSPITAL ENCOUNTER (OUTPATIENT)
Age: 40
Discharge: HOME | End: 2020-07-18
Payer: MEDICARE

## 2020-07-16 LAB
ALBUMIN SERPL-MCNC: 3.4 G/DL (ref 3.5–5.2)
ALP BLD-CCNC: 154 U/L (ref 35–104)
ALT SERPL-CCNC: <5 U/L (ref 0–32)
ANION GAP SERPL CALCULATED.3IONS-SCNC: 14 MMOL/L (ref 7–16)
AST SERPL-CCNC: 16 U/L (ref 0–31)
BASOPHILS ABSOLUTE: 0.14 E9/L (ref 0–0.2)
BASOPHILS RELATIVE PERCENT: 1.8 % (ref 0–2)
BILIRUB SERPL-MCNC: 0.4 MG/DL (ref 0–1.2)
BUN BLDV-MCNC: 8 MG/DL (ref 6–20)
CALCIUM SERPL-MCNC: 9.1 MG/DL (ref 8.6–10.2)
CHLORIDE BLD-SCNC: 96 MMOL/L (ref 98–107)
CO2: 23 MMOL/L (ref 22–29)
CREAT SERPL-MCNC: 0.5 MG/DL (ref 0.5–1)
EOSINOPHILS ABSOLUTE: 0.11 E9/L (ref 0.05–0.5)
EOSINOPHILS RELATIVE PERCENT: 1.4 % (ref 0–6)
GFR AFRICAN AMERICAN: >60
GFR NON-AFRICAN AMERICAN: >60 ML/MIN/1.73
GLUCOSE BLD-MCNC: 145 MG/DL (ref 74–99)
HCT VFR BLD CALC: 39.6 % (ref 34–48)
HEMOGLOBIN: 12.2 G/DL (ref 11.5–15.5)
IMMATURE GRANULOCYTES #: 0.02 E9/L
IMMATURE GRANULOCYTES %: 0.3 % (ref 0–5)
LYMPHOCYTES ABSOLUTE: 2.81 E9/L (ref 1.5–4)
LYMPHOCYTES RELATIVE PERCENT: 35.5 % (ref 20–42)
MCH RBC QN AUTO: 27.8 PG (ref 26–35)
MCHC RBC AUTO-ENTMCNC: 30.8 % (ref 32–34.5)
MCV RBC AUTO: 90.2 FL (ref 80–99.9)
MONOCYTES ABSOLUTE: 0.74 E9/L (ref 0.1–0.95)
MONOCYTES RELATIVE PERCENT: 9.3 % (ref 2–12)
NEUTROPHILS ABSOLUTE: 4.1 E9/L (ref 1.8–7.3)
NEUTROPHILS RELATIVE PERCENT: 51.7 % (ref 43–80)
PDW BLD-RTO: 14.4 FL (ref 11.5–15)
PLATELET # BLD: 483 E9/L (ref 130–450)
PMV BLD AUTO: 10.6 FL (ref 7–12)
POTASSIUM SERPL-SCNC: 4 MMOL/L (ref 3.5–5)
RBC # BLD: 4.39 E12/L (ref 3.5–5.5)
SODIUM BLD-SCNC: 133 MMOL/L (ref 132–146)
TOTAL PROTEIN: 8.3 G/DL (ref 6.4–8.3)
WBC # BLD: 7.9 E9/L (ref 4.5–11.5)

## 2020-07-16 PROCEDURE — 36415 COLL VENOUS BLD VENIPUNCTURE: CPT

## 2020-07-16 PROCEDURE — 85025 COMPLETE CBC W/AUTO DIFF WBC: CPT

## 2020-07-16 PROCEDURE — 76000 FLUOROSCOPY <1 HR PHYS/QHP: CPT

## 2020-07-16 PROCEDURE — 80053 COMPREHEN METABOLIC PANEL: CPT

## 2020-07-16 NOTE — PROGRESS NOTES
Pt to department for ct tube check. Pt states minimal output from drain past several days. Ct scan done, reviewed by Dr. Juvencio Barger and verbal order to remove tube. Tube removed with  No complications, op-site dressing applied to site. Pt given discharge instructions and verbalizes understanding of follow up. Pt ambulatory out of department.

## 2020-07-20 ENCOUNTER — HOSPITAL ENCOUNTER (OUTPATIENT)
Age: 40
Discharge: HOME | End: 2020-07-22
Payer: MEDICARE

## 2020-07-20 LAB
ALBUMIN SERPL-MCNC: 3.5 G/DL (ref 3.5–5.2)
ALP BLD-CCNC: 121 U/L (ref 35–104)
ALT SERPL-CCNC: 5 U/L (ref 0–32)
ANION GAP SERPL CALCULATED.3IONS-SCNC: 13 MMOL/L (ref 7–16)
AST SERPL-CCNC: 20 U/L (ref 0–31)
BASOPHILS ABSOLUTE: 0.11 E9/L (ref 0–0.2)
BASOPHILS RELATIVE PERCENT: 1.7 % (ref 0–2)
BILIRUB SERPL-MCNC: 0.3 MG/DL (ref 0–1.2)
BUN BLDV-MCNC: 7 MG/DL (ref 6–20)
C-REACTIVE PROTEIN: 0.2 MG/DL (ref 0–0.4)
CALCIUM SERPL-MCNC: 9.2 MG/DL (ref 8.6–10.2)
CHLORIDE BLD-SCNC: 102 MMOL/L (ref 98–107)
CO2: 23 MMOL/L (ref 22–29)
CREAT SERPL-MCNC: 0.5 MG/DL (ref 0.5–1)
EOSINOPHILS ABSOLUTE: 0.15 E9/L (ref 0.05–0.5)
EOSINOPHILS RELATIVE PERCENT: 2.4 % (ref 0–6)
GFR AFRICAN AMERICAN: >60
GFR NON-AFRICAN AMERICAN: >60 ML/MIN/1.73
GLUCOSE BLD-MCNC: 154 MG/DL (ref 74–99)
HCT VFR BLD CALC: 40.6 % (ref 34–48)
HEMOGLOBIN: 12.3 G/DL (ref 11.5–15.5)
IMMATURE GRANULOCYTES #: 0.02 E9/L
IMMATURE GRANULOCYTES %: 0.3 % (ref 0–5)
LYMPHOCYTES ABSOLUTE: 2.65 E9/L (ref 1.5–4)
LYMPHOCYTES RELATIVE PERCENT: 41.8 % (ref 20–42)
MCH RBC QN AUTO: 27.8 PG (ref 26–35)
MCHC RBC AUTO-ENTMCNC: 30.3 % (ref 32–34.5)
MCV RBC AUTO: 91.9 FL (ref 80–99.9)
MONOCYTES ABSOLUTE: 0.64 E9/L (ref 0.1–0.95)
MONOCYTES RELATIVE PERCENT: 10.1 % (ref 2–12)
NEUTROPHILS ABSOLUTE: 2.77 E9/L (ref 1.8–7.3)
NEUTROPHILS RELATIVE PERCENT: 43.7 % (ref 43–80)
PDW BLD-RTO: 15 FL (ref 11.5–15)
PLATELET # BLD: 350 E9/L (ref 130–450)
PMV BLD AUTO: 10.7 FL (ref 7–12)
POTASSIUM SERPL-SCNC: 3.8 MMOL/L (ref 3.5–5)
RBC # BLD: 4.42 E12/L (ref 3.5–5.5)
SEDIMENTATION RATE, ERYTHROCYTE: 88 MM/HR (ref 0–20)
SODIUM BLD-SCNC: 138 MMOL/L (ref 132–146)
TOTAL PROTEIN: 7.8 G/DL (ref 6.4–8.3)
WBC # BLD: 6.3 E9/L (ref 4.5–11.5)

## 2020-07-20 PROCEDURE — 85025 COMPLETE CBC W/AUTO DIFF WBC: CPT

## 2020-07-20 PROCEDURE — 86140 C-REACTIVE PROTEIN: CPT

## 2020-07-20 PROCEDURE — 80053 COMPREHEN METABOLIC PANEL: CPT

## 2020-07-20 PROCEDURE — 85651 RBC SED RATE NONAUTOMATED: CPT

## 2020-07-23 ENCOUNTER — HOSPITAL ENCOUNTER (OUTPATIENT)
Age: 40
Discharge: HOME | End: 2020-07-25
Payer: MEDICARE

## 2020-07-23 ENCOUNTER — TELEPHONE (OUTPATIENT)
Dept: PRIMARY CARE CLINIC | Age: 40
End: 2020-07-23

## 2020-07-23 LAB
ALBUMIN SERPL-MCNC: 3.6 G/DL (ref 3.5–5.2)
ALP BLD-CCNC: 95 U/L (ref 35–104)
ALT SERPL-CCNC: 9 U/L (ref 0–32)
ANION GAP SERPL CALCULATED.3IONS-SCNC: 12 MMOL/L (ref 7–16)
AST SERPL-CCNC: 15 U/L (ref 0–31)
BASOPHILS ABSOLUTE: 0.09 E9/L (ref 0–0.2)
BASOPHILS RELATIVE PERCENT: 1.5 % (ref 0–2)
BILIRUB SERPL-MCNC: 0.2 MG/DL (ref 0–1.2)
BUN BLDV-MCNC: 4 MG/DL (ref 6–20)
CALCIUM SERPL-MCNC: 8.9 MG/DL (ref 8.6–10.2)
CHLORIDE BLD-SCNC: 101 MMOL/L (ref 98–107)
CO2: 24 MMOL/L (ref 22–29)
CREAT SERPL-MCNC: 0.5 MG/DL (ref 0.5–1)
EOSINOPHILS ABSOLUTE: 0.17 E9/L (ref 0.05–0.5)
EOSINOPHILS RELATIVE PERCENT: 2.9 % (ref 0–6)
GFR AFRICAN AMERICAN: >60
GFR NON-AFRICAN AMERICAN: >60 ML/MIN/1.73
GLUCOSE BLD-MCNC: 146 MG/DL (ref 74–99)
HCT VFR BLD CALC: 37.9 % (ref 34–48)
HEMOGLOBIN: 11.7 G/DL (ref 11.5–15.5)
IMMATURE GRANULOCYTES #: 0.02 E9/L
IMMATURE GRANULOCYTES %: 0.3 % (ref 0–5)
LYMPHOCYTES ABSOLUTE: 2.46 E9/L (ref 1.5–4)
LYMPHOCYTES RELATIVE PERCENT: 41.9 % (ref 20–42)
MCH RBC QN AUTO: 28.1 PG (ref 26–35)
MCHC RBC AUTO-ENTMCNC: 30.9 % (ref 32–34.5)
MCV RBC AUTO: 91.1 FL (ref 80–99.9)
MONOCYTES ABSOLUTE: 0.66 E9/L (ref 0.1–0.95)
MONOCYTES RELATIVE PERCENT: 11.2 % (ref 2–12)
NEUTROPHILS ABSOLUTE: 2.47 E9/L (ref 1.8–7.3)
NEUTROPHILS RELATIVE PERCENT: 42.2 % (ref 43–80)
PDW BLD-RTO: 15.3 FL (ref 11.5–15)
PLATELET # BLD: 234 E9/L (ref 130–450)
PMV BLD AUTO: 11.1 FL (ref 7–12)
POTASSIUM SERPL-SCNC: 3.8 MMOL/L (ref 3.5–5)
RBC # BLD: 4.16 E12/L (ref 3.5–5.5)
SODIUM BLD-SCNC: 137 MMOL/L (ref 132–146)
TOTAL PROTEIN: 7.6 G/DL (ref 6.4–8.3)
WBC # BLD: 5.9 E9/L (ref 4.5–11.5)

## 2020-07-23 PROCEDURE — 36415 COLL VENOUS BLD VENIPUNCTURE: CPT

## 2020-07-23 PROCEDURE — 85025 COMPLETE CBC W/AUTO DIFF WBC: CPT

## 2020-07-23 PROCEDURE — 80053 COMPREHEN METABOLIC PANEL: CPT

## 2020-07-23 NOTE — TELEPHONE ENCOUNTER
Jorgito Calhoun from  [facility] is calling because the pt gets skilled nursing home health receiving antibiotics through her picc line, and they just found out the pt's [facility] [place]  . She wants to know if an order can be placed for a  to go out to her home to get her some kind of financial assistance.  She says she can take a verbal and if she doesn't answer the phone a message can be left

## 2020-07-27 ENCOUNTER — HOSPITAL ENCOUNTER (OUTPATIENT)
Age: 40
Discharge: HOME | End: 2020-07-29
Payer: MEDICARE

## 2020-07-27 LAB
ALBUMIN SERPL-MCNC: 3.6 G/DL (ref 3.5–5.2)
ALP BLD-CCNC: 84 U/L (ref 35–104)
ALT SERPL-CCNC: 12 U/L (ref 0–32)
ANION GAP SERPL CALCULATED.3IONS-SCNC: 13 MMOL/L (ref 7–16)
AST SERPL-CCNC: 19 U/L (ref 0–31)
BASOPHILS ABSOLUTE: 0.11 E9/L (ref 0–0.2)
BASOPHILS RELATIVE PERCENT: 1.8 % (ref 0–2)
BILIRUB SERPL-MCNC: 0.2 MG/DL (ref 0–1.2)
BUN BLDV-MCNC: 9 MG/DL (ref 6–20)
C-REACTIVE PROTEIN: <0.1 MG/DL (ref 0–0.4)
CALCIUM SERPL-MCNC: 9 MG/DL (ref 8.6–10.2)
CHLORIDE BLD-SCNC: 101 MMOL/L (ref 98–107)
CO2: 24 MMOL/L (ref 22–29)
CREAT SERPL-MCNC: 0.6 MG/DL (ref 0.5–1)
EOSINOPHILS ABSOLUTE: 0.19 E9/L (ref 0.05–0.5)
EOSINOPHILS RELATIVE PERCENT: 3.1 % (ref 0–6)
GFR AFRICAN AMERICAN: >60
GFR NON-AFRICAN AMERICAN: >60 ML/MIN/1.73
GLUCOSE BLD-MCNC: 125 MG/DL (ref 74–99)
HCT VFR BLD CALC: 39.1 % (ref 34–48)
HEMOGLOBIN: 12.5 G/DL (ref 11.5–15.5)
IMMATURE GRANULOCYTES #: 0.02 E9/L
IMMATURE GRANULOCYTES %: 0.3 % (ref 0–5)
LYMPHOCYTES ABSOLUTE: 2.99 E9/L (ref 1.5–4)
LYMPHOCYTES RELATIVE PERCENT: 48.1 % (ref 20–42)
MCH RBC QN AUTO: 28.6 PG (ref 26–35)
MCHC RBC AUTO-ENTMCNC: 32 % (ref 32–34.5)
MCV RBC AUTO: 89.5 FL (ref 80–99.9)
MONOCYTES ABSOLUTE: 0.61 E9/L (ref 0.1–0.95)
MONOCYTES RELATIVE PERCENT: 9.8 % (ref 2–12)
NEUTROPHILS ABSOLUTE: 2.29 E9/L (ref 1.8–7.3)
NEUTROPHILS RELATIVE PERCENT: 36.9 % (ref 43–80)
PDW BLD-RTO: 15.3 FL (ref 11.5–15)
PLATELET # BLD: 195 E9/L (ref 130–450)
PMV BLD AUTO: 11.7 FL (ref 7–12)
POTASSIUM SERPL-SCNC: 3.9 MMOL/L (ref 3.5–5)
RBC # BLD: 4.37 E12/L (ref 3.5–5.5)
SEDIMENTATION RATE, ERYTHROCYTE: 70 MM/HR (ref 0–20)
SODIUM BLD-SCNC: 138 MMOL/L (ref 132–146)
TOTAL PROTEIN: 7.9 G/DL (ref 6.4–8.3)
WBC # BLD: 6.2 E9/L (ref 4.5–11.5)

## 2020-07-27 PROCEDURE — 80053 COMPREHEN METABOLIC PANEL: CPT

## 2020-07-27 PROCEDURE — 36415 COLL VENOUS BLD VENIPUNCTURE: CPT

## 2020-07-27 PROCEDURE — 85025 COMPLETE CBC W/AUTO DIFF WBC: CPT

## 2020-07-27 PROCEDURE — 85651 RBC SED RATE NONAUTOMATED: CPT

## 2020-07-27 PROCEDURE — 86140 C-REACTIVE PROTEIN: CPT

## 2020-07-30 PROBLEM — K75.0 PYOGENIC LIVER ABSCESS: Status: ACTIVE | Noted: 2020-07-30

## 2020-07-31 ENCOUNTER — HOSPITAL ENCOUNTER (OUTPATIENT)
Age: 40
Discharge: HOME | End: 2020-08-02
Payer: MEDICARE

## 2020-07-31 LAB
ALBUMIN SERPL-MCNC: 3.9 G/DL (ref 3.5–5.2)
ALP BLD-CCNC: 85 U/L (ref 35–104)
ALT SERPL-CCNC: 15 U/L (ref 0–32)
ANION GAP SERPL CALCULATED.3IONS-SCNC: 12 MMOL/L (ref 7–16)
AST SERPL-CCNC: 22 U/L (ref 0–31)
BASOPHILS ABSOLUTE: 0.1 E9/L (ref 0–0.2)
BASOPHILS RELATIVE PERCENT: 1.5 % (ref 0–2)
BILIRUB SERPL-MCNC: 0.3 MG/DL (ref 0–1.2)
BUN BLDV-MCNC: 10 MG/DL (ref 6–20)
CALCIUM SERPL-MCNC: 9.4 MG/DL (ref 8.6–10.2)
CHLORIDE BLD-SCNC: 101 MMOL/L (ref 98–107)
CO2: 24 MMOL/L (ref 22–29)
CREAT SERPL-MCNC: 0.5 MG/DL (ref 0.5–1)
EOSINOPHILS ABSOLUTE: 0.17 E9/L (ref 0.05–0.5)
EOSINOPHILS RELATIVE PERCENT: 2.6 % (ref 0–6)
GFR AFRICAN AMERICAN: >60
GFR NON-AFRICAN AMERICAN: >60 ML/MIN/1.73
GLUCOSE BLD-MCNC: 223 MG/DL (ref 74–99)
HCT VFR BLD CALC: 43.6 % (ref 34–48)
HEMOGLOBIN: 13.7 G/DL (ref 11.5–15.5)
IMMATURE GRANULOCYTES #: 0.01 E9/L
IMMATURE GRANULOCYTES %: 0.2 % (ref 0–5)
LYMPHOCYTES ABSOLUTE: 3.29 E9/L (ref 1.5–4)
LYMPHOCYTES RELATIVE PERCENT: 49.8 % (ref 20–42)
MCH RBC QN AUTO: 28.7 PG (ref 26–35)
MCHC RBC AUTO-ENTMCNC: 31.4 % (ref 32–34.5)
MCV RBC AUTO: 91.4 FL (ref 80–99.9)
MONOCYTES ABSOLUTE: 0.62 E9/L (ref 0.1–0.95)
MONOCYTES RELATIVE PERCENT: 9.4 % (ref 2–12)
NEUTROPHILS ABSOLUTE: 2.42 E9/L (ref 1.8–7.3)
NEUTROPHILS RELATIVE PERCENT: 36.5 % (ref 43–80)
PDW BLD-RTO: 15.8 FL (ref 11.5–15)
PLATELET # BLD: 198 E9/L (ref 130–450)
PMV BLD AUTO: 11.8 FL (ref 7–12)
POTASSIUM SERPL-SCNC: 4 MMOL/L (ref 3.5–5)
RBC # BLD: 4.77 E12/L (ref 3.5–5.5)
SODIUM BLD-SCNC: 137 MMOL/L (ref 132–146)
TOTAL PROTEIN: 8.1 G/DL (ref 6.4–8.3)
WBC # BLD: 6.6 E9/L (ref 4.5–11.5)

## 2020-07-31 PROCEDURE — 85025 COMPLETE CBC W/AUTO DIFF WBC: CPT

## 2020-07-31 PROCEDURE — 36415 COLL VENOUS BLD VENIPUNCTURE: CPT

## 2020-07-31 PROCEDURE — 80053 COMPREHEN METABOLIC PANEL: CPT

## 2020-07-31 RX ORDER — GLIMEPIRIDE 4 MG/1
4 TABLET ORAL
Qty: 30 TABLET | Refills: 3 | Status: SHIPPED
Start: 2020-07-31 | End: 2020-09-28 | Stop reason: SDUPTHER

## 2020-07-31 RX ORDER — PANTOPRAZOLE SODIUM 40 MG/1
40 TABLET, DELAYED RELEASE ORAL
Qty: 30 TABLET | Refills: 5 | Status: SHIPPED
Start: 2020-07-31 | End: 2020-09-11 | Stop reason: SDUPTHER

## 2020-08-04 ENCOUNTER — OFFICE VISIT (OUTPATIENT)
Dept: CARDIOLOGY CLINIC | Age: 40
End: 2020-08-04
Payer: MEDICARE

## 2020-08-04 ENCOUNTER — HOSPITAL ENCOUNTER (OUTPATIENT)
Age: 40
Discharge: HOME | End: 2020-08-06
Payer: MEDICARE

## 2020-08-04 VITALS
SYSTOLIC BLOOD PRESSURE: 128 MMHG | BODY MASS INDEX: 27.41 KG/M2 | WEIGHT: 139.6 LBS | HEART RATE: 93 BPM | DIASTOLIC BLOOD PRESSURE: 80 MMHG | RESPIRATION RATE: 16 BRPM | HEIGHT: 60 IN

## 2020-08-04 LAB
ALBUMIN SERPL-MCNC: 3.8 G/DL (ref 3.5–5.2)
ALP BLD-CCNC: 77 U/L (ref 35–104)
ALT SERPL-CCNC: 14 U/L (ref 0–32)
ANION GAP SERPL CALCULATED.3IONS-SCNC: 13 MMOL/L (ref 7–16)
AST SERPL-CCNC: 21 U/L (ref 0–31)
BASOPHILS ABSOLUTE: 0.11 E9/L (ref 0–0.2)
BASOPHILS RELATIVE PERCENT: 1.8 % (ref 0–2)
BILIRUB SERPL-MCNC: 0.3 MG/DL (ref 0–1.2)
BUN BLDV-MCNC: 8 MG/DL (ref 6–20)
C-REACTIVE PROTEIN: <0.1 MG/DL (ref 0–0.4)
CALCIUM SERPL-MCNC: 9.3 MG/DL (ref 8.6–10.2)
CHLORIDE BLD-SCNC: 96 MMOL/L (ref 98–107)
CO2: 26 MMOL/L (ref 22–29)
CREAT SERPL-MCNC: 0.5 MG/DL (ref 0.5–1)
EOSINOPHILS ABSOLUTE: 0.14 E9/L (ref 0.05–0.5)
EOSINOPHILS RELATIVE PERCENT: 2.3 % (ref 0–6)
GFR AFRICAN AMERICAN: >60
GFR NON-AFRICAN AMERICAN: >60 ML/MIN/1.73
GLUCOSE BLD-MCNC: 140 MG/DL (ref 74–99)
HCT VFR BLD CALC: 42.2 % (ref 34–48)
HEMOGLOBIN: 13.7 G/DL (ref 11.5–15.5)
IMMATURE GRANULOCYTES #: 0.01 E9/L
IMMATURE GRANULOCYTES %: 0.2 % (ref 0–5)
LYMPHOCYTES ABSOLUTE: 3.14 E9/L (ref 1.5–4)
LYMPHOCYTES RELATIVE PERCENT: 52.5 % (ref 20–42)
MCH RBC QN AUTO: 28.8 PG (ref 26–35)
MCHC RBC AUTO-ENTMCNC: 32.5 % (ref 32–34.5)
MCV RBC AUTO: 88.7 FL (ref 80–99.9)
MONOCYTES ABSOLUTE: 0.57 E9/L (ref 0.1–0.95)
MONOCYTES RELATIVE PERCENT: 9.5 % (ref 2–12)
NEUTROPHILS ABSOLUTE: 2.01 E9/L (ref 1.8–7.3)
NEUTROPHILS RELATIVE PERCENT: 33.7 % (ref 43–80)
PDW BLD-RTO: 15.4 FL (ref 11.5–15)
PLATELET # BLD: 210 E9/L (ref 130–450)
PMV BLD AUTO: 12.1 FL (ref 7–12)
POTASSIUM SERPL-SCNC: 3.9 MMOL/L (ref 3.5–5)
RBC # BLD: 4.76 E12/L (ref 3.5–5.5)
SEDIMENTATION RATE, ERYTHROCYTE: 54 MM/HR (ref 0–20)
SODIUM BLD-SCNC: 135 MMOL/L (ref 132–146)
TOTAL PROTEIN: 7.9 G/DL (ref 6.4–8.3)
WBC # BLD: 6 E9/L (ref 4.5–11.5)

## 2020-08-04 PROCEDURE — 1111F DSCHRG MED/CURRENT MED MERGE: CPT | Performed by: INTERNAL MEDICINE

## 2020-08-04 PROCEDURE — 99204 OFFICE O/P NEW MOD 45 MIN: CPT | Performed by: INTERNAL MEDICINE

## 2020-08-04 PROCEDURE — 85651 RBC SED RATE NONAUTOMATED: CPT

## 2020-08-04 PROCEDURE — 86140 C-REACTIVE PROTEIN: CPT

## 2020-08-04 PROCEDURE — 36415 COLL VENOUS BLD VENIPUNCTURE: CPT

## 2020-08-04 PROCEDURE — 85025 COMPLETE CBC W/AUTO DIFF WBC: CPT

## 2020-08-04 PROCEDURE — 1036F TOBACCO NON-USER: CPT | Performed by: INTERNAL MEDICINE

## 2020-08-04 PROCEDURE — G8417 CALC BMI ABV UP PARAM F/U: HCPCS | Performed by: INTERNAL MEDICINE

## 2020-08-04 PROCEDURE — 80053 COMPREHEN METABOLIC PANEL: CPT

## 2020-08-04 PROCEDURE — 93000 ELECTROCARDIOGRAM COMPLETE: CPT | Performed by: INTERNAL MEDICINE

## 2020-08-04 PROCEDURE — G8427 DOCREV CUR MEDS BY ELIG CLIN: HCPCS | Performed by: INTERNAL MEDICINE

## 2020-08-04 NOTE — PROGRESS NOTES
CHIEF COMPLAINT: Abnormal EKG/Palpitations    HISTORY OF PRESENT ILLNESS: Patient is a 38 y.o. female seen at the request of Michele Pina DO. Patient presents for abnormal EKG and palpitations. No CP or SOB. Past Medical History:   Diagnosis Date    Carpal tunnel syndrome, right     Diabetes mellitus (HCC)     GERD (gastroesophageal reflux disease)     Hyperlipidemia     Hypothyroidism        Patient Active Problem List   Diagnosis    Gastroesophageal reflux disease without esophagitis    Hiatal hernia    Carpal tunnel syndrome of right wrist    Type 2 diabetes mellitus without complication, without long-term current use of insulin (HCC)    Abdominal pain    Sepsis (HCC)    Liver lesion    SIRS (systemic inflammatory response syndrome) (HCC)    Pyogenic liver abscess       Allergies   Allergen Reactions    Iodine Anaphylaxis    Shellfish-Derived Products Anaphylaxis, Hives, Itching and Rash       Current Outpatient Medications   Medication Sig Dispense Refill    pantoprazole (PROTONIX) 40 MG tablet Take 1 tablet by mouth every morning (before breakfast) 30 tablet 5    glimepiride (AMARYL) 4 MG tablet Take 1 tablet by mouth every morning (before breakfast) 30 tablet 3    potassium chloride (KLOR-CON M) 20 MEQ extended release tablet Take 1 tablet by mouth daily 30 tablet 1    metroNIDAZOLE (FLAGYL) 500 MG tablet Take 1 tablet by mouth every 8 hours 126 tablet 0    cefTRIAXone (ROCEPHIN) infusion Infuse 2,000 mg intravenously every 24 hours Compound per protocol 84 g 0    glimepiride (AMARYL) 4 MG tablet Take 4 mg by mouth every morning (before breakfast)      levothyroxine (SYNTHROID) 25 MCG tablet Take 1 tablet by mouth daily 30 tablet 5    Dulaglutide (TRULICITY) 6.94 YA/8.9LR SOPN Inject 0.75 mg into the skin once a week (Patient taking differently: Inject 0.75 mg into the skin once a week NEW MEDICINE AND IS PICKING UP TODAY.  Has not administered yet) 4 pen 3   

## 2020-08-07 ENCOUNTER — HOSPITAL ENCOUNTER (OUTPATIENT)
Age: 40
Discharge: HOME | End: 2020-08-09
Payer: MEDICARE

## 2020-08-07 LAB
ALBUMIN SERPL-MCNC: 3.8 G/DL (ref 3.5–5.2)
ALP BLD-CCNC: 73 U/L (ref 35–104)
ALT SERPL-CCNC: 16 U/L (ref 0–32)
ANION GAP SERPL CALCULATED.3IONS-SCNC: 13 MMOL/L (ref 7–16)
AST SERPL-CCNC: 25 U/L (ref 0–31)
BASOPHILS ABSOLUTE: 0.09 E9/L (ref 0–0.2)
BASOPHILS RELATIVE PERCENT: 1.7 % (ref 0–2)
BILIRUB SERPL-MCNC: 0.3 MG/DL (ref 0–1.2)
BUN BLDV-MCNC: 9 MG/DL (ref 6–20)
CALCIUM SERPL-MCNC: 9.2 MG/DL (ref 8.6–10.2)
CHLORIDE BLD-SCNC: 102 MMOL/L (ref 98–107)
CO2: 23 MMOL/L (ref 22–29)
CREAT SERPL-MCNC: 0.5 MG/DL (ref 0.5–1)
EOSINOPHILS ABSOLUTE: 0.14 E9/L (ref 0.05–0.5)
EOSINOPHILS RELATIVE PERCENT: 2.6 % (ref 0–6)
GFR AFRICAN AMERICAN: >60
GFR NON-AFRICAN AMERICAN: >60 ML/MIN/1.73
GLUCOSE BLD-MCNC: 134 MG/DL (ref 74–99)
HCT VFR BLD CALC: 41.4 % (ref 34–48)
HEMOGLOBIN: 13.4 G/DL (ref 11.5–15.5)
IMMATURE GRANULOCYTES #: 0 E9/L
IMMATURE GRANULOCYTES %: 0 % (ref 0–5)
LYMPHOCYTES ABSOLUTE: 2.82 E9/L (ref 1.5–4)
LYMPHOCYTES RELATIVE PERCENT: 52.4 % (ref 20–42)
MCH RBC QN AUTO: 29.1 PG (ref 26–35)
MCHC RBC AUTO-ENTMCNC: 32.4 % (ref 32–34.5)
MCV RBC AUTO: 90 FL (ref 80–99.9)
MONOCYTES ABSOLUTE: 0.5 E9/L (ref 0.1–0.95)
MONOCYTES RELATIVE PERCENT: 9.3 % (ref 2–12)
NEUTROPHILS ABSOLUTE: 1.83 E9/L (ref 1.8–7.3)
NEUTROPHILS RELATIVE PERCENT: 34 % (ref 43–80)
PDW BLD-RTO: 15.4 FL (ref 11.5–15)
PLATELET # BLD: 216 E9/L (ref 130–450)
PMV BLD AUTO: 11.9 FL (ref 7–12)
POTASSIUM SERPL-SCNC: 4.1 MMOL/L (ref 3.5–5)
RBC # BLD: 4.6 E12/L (ref 3.5–5.5)
SODIUM BLD-SCNC: 138 MMOL/L (ref 132–146)
TOTAL PROTEIN: 7.7 G/DL (ref 6.4–8.3)
WBC # BLD: 5.4 E9/L (ref 4.5–11.5)

## 2020-08-07 PROCEDURE — 80053 COMPREHEN METABOLIC PANEL: CPT

## 2020-08-07 PROCEDURE — 36415 COLL VENOUS BLD VENIPUNCTURE: CPT

## 2020-08-07 PROCEDURE — 85025 COMPLETE CBC W/AUTO DIFF WBC: CPT

## 2020-08-10 RX ORDER — LEVOTHYROXINE SODIUM 0.03 MG/1
25 TABLET ORAL DAILY
Qty: 30 TABLET | Refills: 5 | Status: SHIPPED
Start: 2020-08-10 | End: 2020-09-11 | Stop reason: SDUPTHER

## 2020-08-10 RX ORDER — DULAGLUTIDE 0.75 MG/.5ML
0.75 INJECTION, SOLUTION SUBCUTANEOUS WEEKLY
Qty: 4 PEN | Refills: 3 | Status: SHIPPED
Start: 2020-08-10 | End: 2020-09-29 | Stop reason: SDUPTHER

## 2020-08-10 RX ORDER — BLOOD-GLUCOSE METER
1 KIT MISCELLANEOUS DAILY
Qty: 100 EACH | Refills: 3 | Status: SHIPPED
Start: 2020-08-10 | End: 2020-10-22 | Stop reason: SDUPTHER

## 2020-08-11 ENCOUNTER — HOSPITAL ENCOUNTER (OUTPATIENT)
Age: 40
Discharge: HOME | End: 2020-08-13
Payer: MEDICARE

## 2020-08-11 LAB
ALBUMIN SERPL-MCNC: 3.9 G/DL (ref 3.5–5.2)
ALP BLD-CCNC: 69 U/L (ref 35–104)
ALT SERPL-CCNC: 16 U/L (ref 0–32)
ANION GAP SERPL CALCULATED.3IONS-SCNC: 14 MMOL/L (ref 7–16)
AST SERPL-CCNC: 25 U/L (ref 0–31)
BASOPHILS ABSOLUTE: 0.07 E9/L (ref 0–0.2)
BASOPHILS RELATIVE PERCENT: 1.5 % (ref 0–2)
BILIRUB SERPL-MCNC: 0.3 MG/DL (ref 0–1.2)
BUN BLDV-MCNC: 9 MG/DL (ref 6–20)
C-REACTIVE PROTEIN: <0.1 MG/DL (ref 0–0.4)
CALCIUM SERPL-MCNC: 9 MG/DL (ref 8.6–10.2)
CHLORIDE BLD-SCNC: 100 MMOL/L (ref 98–107)
CO2: 23 MMOL/L (ref 22–29)
CREAT SERPL-MCNC: 0.6 MG/DL (ref 0.5–1)
EOSINOPHILS ABSOLUTE: 0.15 E9/L (ref 0.05–0.5)
EOSINOPHILS RELATIVE PERCENT: 3.1 % (ref 0–6)
GFR AFRICAN AMERICAN: >60
GFR NON-AFRICAN AMERICAN: >60 ML/MIN/1.73
GLUCOSE BLD-MCNC: 178 MG/DL (ref 74–99)
HCT VFR BLD CALC: 42.2 % (ref 34–48)
HEMOGLOBIN: 13.6 G/DL (ref 11.5–15.5)
IMMATURE GRANULOCYTES #: 0.01 E9/L
IMMATURE GRANULOCYTES %: 0.2 % (ref 0–5)
LYMPHOCYTES ABSOLUTE: 2.46 E9/L (ref 1.5–4)
LYMPHOCYTES RELATIVE PERCENT: 51.4 % (ref 20–42)
MCH RBC QN AUTO: 29.3 PG (ref 26–35)
MCHC RBC AUTO-ENTMCNC: 32.2 % (ref 32–34.5)
MCV RBC AUTO: 90.9 FL (ref 80–99.9)
MONOCYTES ABSOLUTE: 0.52 E9/L (ref 0.1–0.95)
MONOCYTES RELATIVE PERCENT: 10.9 % (ref 2–12)
NEUTROPHILS ABSOLUTE: 1.58 E9/L (ref 1.8–7.3)
NEUTROPHILS RELATIVE PERCENT: 32.9 % (ref 43–80)
PDW BLD-RTO: 15.4 FL (ref 11.5–15)
PLATELET # BLD: 223 E9/L (ref 130–450)
PMV BLD AUTO: 11.8 FL (ref 7–12)
POTASSIUM SERPL-SCNC: 3.7 MMOL/L (ref 3.5–5)
RBC # BLD: 4.64 E12/L (ref 3.5–5.5)
SEDIMENTATION RATE, ERYTHROCYTE: 45 MM/HR (ref 0–20)
SODIUM BLD-SCNC: 137 MMOL/L (ref 132–146)
TOTAL PROTEIN: 7.7 G/DL (ref 6.4–8.3)
WBC # BLD: 4.8 E9/L (ref 4.5–11.5)

## 2020-08-11 PROCEDURE — 80053 COMPREHEN METABOLIC PANEL: CPT

## 2020-08-11 PROCEDURE — 36415 COLL VENOUS BLD VENIPUNCTURE: CPT

## 2020-08-11 PROCEDURE — 86140 C-REACTIVE PROTEIN: CPT

## 2020-08-11 PROCEDURE — 85651 RBC SED RATE NONAUTOMATED: CPT

## 2020-08-11 PROCEDURE — 85025 COMPLETE CBC W/AUTO DIFF WBC: CPT

## 2020-08-14 ENCOUNTER — HOSPITAL ENCOUNTER (OUTPATIENT)
Age: 40
Discharge: HOME | End: 2020-08-16
Payer: MEDICARE

## 2020-08-14 LAB
ALBUMIN SERPL-MCNC: 4.2 G/DL (ref 3.5–5.2)
ALP BLD-CCNC: 77 U/L (ref 35–104)
ALT SERPL-CCNC: 20 U/L (ref 0–32)
ANION GAP SERPL CALCULATED.3IONS-SCNC: 14 MMOL/L (ref 7–16)
AST SERPL-CCNC: 26 U/L (ref 0–31)
BASOPHILS ABSOLUTE: 0.08 E9/L (ref 0–0.2)
BASOPHILS RELATIVE PERCENT: 1.4 % (ref 0–2)
BILIRUB SERPL-MCNC: 0.3 MG/DL (ref 0–1.2)
BUN BLDV-MCNC: 10 MG/DL (ref 6–20)
CALCIUM SERPL-MCNC: 9.5 MG/DL (ref 8.6–10.2)
CHLORIDE BLD-SCNC: 99 MMOL/L (ref 98–107)
CO2: 24 MMOL/L (ref 22–29)
CREAT SERPL-MCNC: 0.6 MG/DL (ref 0.5–1)
EOSINOPHILS ABSOLUTE: 0.18 E9/L (ref 0.05–0.5)
EOSINOPHILS RELATIVE PERCENT: 3.2 % (ref 0–6)
GFR AFRICAN AMERICAN: >60
GFR NON-AFRICAN AMERICAN: >60 ML/MIN/1.73
GLUCOSE BLD-MCNC: 169 MG/DL (ref 74–99)
HCT VFR BLD CALC: 44.2 % (ref 34–48)
HEMOGLOBIN: 14.5 G/DL (ref 11.5–15.5)
IMMATURE GRANULOCYTES #: 0.01 E9/L
IMMATURE GRANULOCYTES %: 0.2 % (ref 0–5)
LYMPHOCYTES ABSOLUTE: 2.99 E9/L (ref 1.5–4)
LYMPHOCYTES RELATIVE PERCENT: 52.9 % (ref 20–42)
MCH RBC QN AUTO: 28.8 PG (ref 26–35)
MCHC RBC AUTO-ENTMCNC: 32.8 % (ref 32–34.5)
MCV RBC AUTO: 87.7 FL (ref 80–99.9)
MONOCYTES ABSOLUTE: 0.5 E9/L (ref 0.1–0.95)
MONOCYTES RELATIVE PERCENT: 8.8 % (ref 2–12)
NEUTROPHILS ABSOLUTE: 1.89 E9/L (ref 1.8–7.3)
NEUTROPHILS RELATIVE PERCENT: 33.5 % (ref 43–80)
PDW BLD-RTO: 15 FL (ref 11.5–15)
PLATELET # BLD: 240 E9/L (ref 130–450)
PMV BLD AUTO: 11.5 FL (ref 7–12)
POTASSIUM SERPL-SCNC: 4.2 MMOL/L (ref 3.5–5)
RBC # BLD: 5.04 E12/L (ref 3.5–5.5)
SODIUM BLD-SCNC: 137 MMOL/L (ref 132–146)
TOTAL PROTEIN: 8.1 G/DL (ref 6.4–8.3)
WBC # BLD: 5.7 E9/L (ref 4.5–11.5)

## 2020-08-14 PROCEDURE — 85025 COMPLETE CBC W/AUTO DIFF WBC: CPT

## 2020-08-14 PROCEDURE — 80053 COMPREHEN METABOLIC PANEL: CPT

## 2020-08-14 PROCEDURE — 36415 COLL VENOUS BLD VENIPUNCTURE: CPT

## 2020-08-15 RX ORDER — METRONIDAZOLE 500 MG/1
500 TABLET ORAL 3 TIMES DAILY
Qty: 30 TABLET | Refills: 0 | Status: SHIPPED | OUTPATIENT
Start: 2020-08-15 | End: 2020-08-25

## 2020-08-17 ENCOUNTER — HOSPITAL ENCOUNTER (OUTPATIENT)
Age: 40
Discharge: HOME | End: 2020-08-19
Payer: MEDICARE

## 2020-08-17 LAB
ALBUMIN SERPL-MCNC: 3.9 G/DL (ref 3.5–5.2)
ALP BLD-CCNC: 67 U/L (ref 35–104)
ALT SERPL-CCNC: 16 U/L (ref 0–32)
ANION GAP SERPL CALCULATED.3IONS-SCNC: 14 MMOL/L (ref 7–16)
AST SERPL-CCNC: 20 U/L (ref 0–31)
BASOPHILS ABSOLUTE: 0.07 E9/L (ref 0–0.2)
BASOPHILS RELATIVE PERCENT: 1.6 % (ref 0–2)
BILIRUB SERPL-MCNC: 0.3 MG/DL (ref 0–1.2)
BUN BLDV-MCNC: 8 MG/DL (ref 6–20)
C-REACTIVE PROTEIN: <0.1 MG/DL (ref 0–0.4)
CALCIUM SERPL-MCNC: 9 MG/DL (ref 8.6–10.2)
CHLORIDE BLD-SCNC: 104 MMOL/L (ref 98–107)
CO2: 22 MMOL/L (ref 22–29)
CREAT SERPL-MCNC: 0.6 MG/DL (ref 0.5–1)
EOSINOPHILS ABSOLUTE: 0.16 E9/L (ref 0.05–0.5)
EOSINOPHILS RELATIVE PERCENT: 3.7 % (ref 0–6)
GFR AFRICAN AMERICAN: >60
GFR NON-AFRICAN AMERICAN: >60 ML/MIN/1.73
GLUCOSE BLD-MCNC: 195 MG/DL (ref 74–99)
HCT VFR BLD CALC: 40 % (ref 34–48)
HEMOGLOBIN: 13.1 G/DL (ref 11.5–15.5)
IMMATURE GRANULOCYTES #: 0.01 E9/L
IMMATURE GRANULOCYTES %: 0.2 % (ref 0–5)
LYMPHOCYTES ABSOLUTE: 2.5 E9/L (ref 1.5–4)
LYMPHOCYTES RELATIVE PERCENT: 57.3 % (ref 20–42)
MCH RBC QN AUTO: 29.4 PG (ref 26–35)
MCHC RBC AUTO-ENTMCNC: 32.8 % (ref 32–34.5)
MCV RBC AUTO: 89.7 FL (ref 80–99.9)
MONOCYTES ABSOLUTE: 0.42 E9/L (ref 0.1–0.95)
MONOCYTES RELATIVE PERCENT: 9.6 % (ref 2–12)
NEUTROPHILS ABSOLUTE: 1.2 E9/L (ref 1.8–7.3)
NEUTROPHILS RELATIVE PERCENT: 27.6 % (ref 43–80)
PDW BLD-RTO: 14.9 FL (ref 11.5–15)
PLATELET # BLD: 204 E9/L (ref 130–450)
PMV BLD AUTO: 11.8 FL (ref 7–12)
POTASSIUM SERPL-SCNC: 3.6 MMOL/L (ref 3.5–5)
RBC # BLD: 4.46 E12/L (ref 3.5–5.5)
SEDIMENTATION RATE, ERYTHROCYTE: 35 MM/HR (ref 0–20)
SODIUM BLD-SCNC: 140 MMOL/L (ref 132–146)
TOTAL PROTEIN: 7.5 G/DL (ref 6.4–8.3)
WBC # BLD: 4.4 E9/L (ref 4.5–11.5)

## 2020-08-17 PROCEDURE — 80053 COMPREHEN METABOLIC PANEL: CPT

## 2020-08-17 PROCEDURE — 86140 C-REACTIVE PROTEIN: CPT

## 2020-08-17 PROCEDURE — 85651 RBC SED RATE NONAUTOMATED: CPT

## 2020-08-17 PROCEDURE — 85025 COMPLETE CBC W/AUTO DIFF WBC: CPT

## 2020-08-17 PROCEDURE — 36415 COLL VENOUS BLD VENIPUNCTURE: CPT

## 2020-08-20 ENCOUNTER — HOSPITAL ENCOUNTER (OUTPATIENT)
Age: 40
Discharge: HOME | End: 2020-08-22
Payer: MEDICARE

## 2020-08-20 LAB
ALBUMIN SERPL-MCNC: 4.1 G/DL (ref 3.5–5.2)
ALP BLD-CCNC: 72 U/L (ref 35–104)
ALT SERPL-CCNC: 17 U/L (ref 0–32)
ANION GAP SERPL CALCULATED.3IONS-SCNC: 22 MMOL/L (ref 7–16)
AST SERPL-CCNC: 26 U/L (ref 0–31)
BASOPHILS ABSOLUTE: 0.09 E9/L (ref 0–0.2)
BASOPHILS RELATIVE PERCENT: 1.7 % (ref 0–2)
BILIRUB SERPL-MCNC: <0.2 MG/DL (ref 0–1.2)
BUN BLDV-MCNC: 13 MG/DL (ref 6–20)
CALCIUM SERPL-MCNC: 9.6 MG/DL (ref 8.6–10.2)
CHLORIDE BLD-SCNC: 103 MMOL/L (ref 98–107)
CO2: 19 MMOL/L (ref 22–29)
CREAT SERPL-MCNC: 0.6 MG/DL (ref 0.5–1)
EOSINOPHILS ABSOLUTE: 0.19 E9/L (ref 0.05–0.5)
EOSINOPHILS RELATIVE PERCENT: 3.6 % (ref 0–6)
GFR AFRICAN AMERICAN: >60
GFR NON-AFRICAN AMERICAN: >60 ML/MIN/1.73
GLUCOSE BLD-MCNC: 173 MG/DL (ref 74–99)
HCT VFR BLD CALC: 42.1 % (ref 34–48)
HEMOGLOBIN: 13.7 G/DL (ref 11.5–15.5)
IMMATURE GRANULOCYTES #: 0 E9/L
IMMATURE GRANULOCYTES %: 0 % (ref 0–5)
LYMPHOCYTES ABSOLUTE: 2.83 E9/L (ref 1.5–4)
LYMPHOCYTES RELATIVE PERCENT: 53.2 % (ref 20–42)
MCH RBC QN AUTO: 28.8 PG (ref 26–35)
MCHC RBC AUTO-ENTMCNC: 32.5 % (ref 32–34.5)
MCV RBC AUTO: 88.6 FL (ref 80–99.9)
MONOCYTES ABSOLUTE: 0.53 E9/L (ref 0.1–0.95)
MONOCYTES RELATIVE PERCENT: 10 % (ref 2–12)
NEUTROPHILS ABSOLUTE: 1.68 E9/L (ref 1.8–7.3)
NEUTROPHILS RELATIVE PERCENT: 31.5 % (ref 43–80)
PDW BLD-RTO: 14.7 FL (ref 11.5–15)
PLATELET # BLD: 205 E9/L (ref 130–450)
PMV BLD AUTO: 11.7 FL (ref 7–12)
POTASSIUM SERPL-SCNC: 3.8 MMOL/L (ref 3.5–5)
RBC # BLD: 4.75 E12/L (ref 3.5–5.5)
SODIUM BLD-SCNC: 144 MMOL/L (ref 132–146)
TOTAL PROTEIN: 7.7 G/DL (ref 6.4–8.3)
WBC # BLD: 5.3 E9/L (ref 4.5–11.5)

## 2020-08-20 PROCEDURE — 80053 COMPREHEN METABOLIC PANEL: CPT

## 2020-08-20 PROCEDURE — 85025 COMPLETE CBC W/AUTO DIFF WBC: CPT

## 2020-08-20 PROCEDURE — 36415 COLL VENOUS BLD VENIPUNCTURE: CPT

## 2020-08-24 ENCOUNTER — HOSPITAL ENCOUNTER (OUTPATIENT)
Dept: CT IMAGING | Age: 40
Discharge: HOME | End: 2020-08-26
Payer: MEDICARE

## 2020-08-24 PROCEDURE — 74176 CT ABD & PELVIS W/O CONTRAST: CPT

## 2020-08-25 ENCOUNTER — HOSPITAL ENCOUNTER (OUTPATIENT)
Age: 40
Discharge: HOME | End: 2020-08-27
Payer: MEDICARE

## 2020-08-25 LAB
ALBUMIN SERPL-MCNC: 4.1 G/DL (ref 3.5–5.2)
ALP BLD-CCNC: 72 U/L (ref 35–104)
ALT SERPL-CCNC: 19 U/L (ref 0–32)
ANION GAP SERPL CALCULATED.3IONS-SCNC: 14 MMOL/L (ref 7–16)
AST SERPL-CCNC: 20 U/L (ref 0–31)
BASOPHILS ABSOLUTE: 0.09 E9/L (ref 0–0.2)
BASOPHILS RELATIVE PERCENT: 1.6 % (ref 0–2)
BILIRUB SERPL-MCNC: 0.3 MG/DL (ref 0–1.2)
BUN BLDV-MCNC: 11 MG/DL (ref 6–20)
C-REACTIVE PROTEIN: <0.1 MG/DL (ref 0–0.4)
CALCIUM SERPL-MCNC: 9.4 MG/DL (ref 8.6–10.2)
CHLORIDE BLD-SCNC: 102 MMOL/L (ref 98–107)
CO2: 23 MMOL/L (ref 22–29)
CREAT SERPL-MCNC: 0.6 MG/DL (ref 0.5–1)
EOSINOPHILS ABSOLUTE: 0.24 E9/L (ref 0.05–0.5)
EOSINOPHILS RELATIVE PERCENT: 4.4 % (ref 0–6)
GFR AFRICAN AMERICAN: >60
GFR NON-AFRICAN AMERICAN: >60 ML/MIN/1.73
GLUCOSE BLD-MCNC: 163 MG/DL (ref 74–99)
HCT VFR BLD CALC: 41.1 % (ref 34–48)
HEMOGLOBIN: 13.9 G/DL (ref 11.5–15.5)
IMMATURE GRANULOCYTES #: 0.01 E9/L
IMMATURE GRANULOCYTES %: 0.2 % (ref 0–5)
LYMPHOCYTES ABSOLUTE: 2.65 E9/L (ref 1.5–4)
LYMPHOCYTES RELATIVE PERCENT: 48.5 % (ref 20–42)
MCH RBC QN AUTO: 29 PG (ref 26–35)
MCHC RBC AUTO-ENTMCNC: 33.8 % (ref 32–34.5)
MCV RBC AUTO: 85.8 FL (ref 80–99.9)
MONOCYTES ABSOLUTE: 0.47 E9/L (ref 0.1–0.95)
MONOCYTES RELATIVE PERCENT: 8.6 % (ref 2–12)
NEUTROPHILS ABSOLUTE: 2 E9/L (ref 1.8–7.3)
NEUTROPHILS RELATIVE PERCENT: 36.7 % (ref 43–80)
PDW BLD-RTO: 14.4 FL (ref 11.5–15)
PLATELET # BLD: 198 E9/L (ref 130–450)
PMV BLD AUTO: 11.8 FL (ref 7–12)
POTASSIUM SERPL-SCNC: 4.1 MMOL/L (ref 3.5–5)
RBC # BLD: 4.79 E12/L (ref 3.5–5.5)
SEDIMENTATION RATE, ERYTHROCYTE: 55 MM/HR (ref 0–20)
SODIUM BLD-SCNC: 139 MMOL/L (ref 132–146)
TOTAL PROTEIN: 7.7 G/DL (ref 6.4–8.3)
WBC # BLD: 5.5 E9/L (ref 4.5–11.5)

## 2020-08-25 PROCEDURE — 80053 COMPREHEN METABOLIC PANEL: CPT

## 2020-08-25 PROCEDURE — 36415 COLL VENOUS BLD VENIPUNCTURE: CPT

## 2020-08-25 PROCEDURE — 86140 C-REACTIVE PROTEIN: CPT

## 2020-08-25 PROCEDURE — 85025 COMPLETE CBC W/AUTO DIFF WBC: CPT

## 2020-08-25 PROCEDURE — 85651 RBC SED RATE NONAUTOMATED: CPT

## 2020-08-27 ENCOUNTER — TELEPHONE (OUTPATIENT)
Dept: PRIMARY CARE CLINIC | Age: 40
End: 2020-08-27

## 2020-08-27 NOTE — LETTER
[address] [place] [address]  Phone: [phone]  Fax: [phone], [place]        August 27, 2020     Patient: Bin Tesfaye   YOB: 1980   SSN: (last 4) [id number]       To Whom It May Concern: It is my medical opinion that Raj Mejía was admitted to [facility] on 6/29/2020. She was treated for septicemia and a unknown lesion on her liver. She remained in the hospital until 7/5/2020. If you have any questions or concerns, please don't hesitate to call.     Sincerely,        Enrique Izquierdo DO

## 2020-08-27 NOTE — TELEPHONE ENCOUNTER
Pt is requesting a letter for [facility] stating that she was admitted into the hospital on 6/20 and discharged on 7/5 and the reason she was in the hospital.  Letter will need to be on a letter head with pt's ss #. She will  when complete.

## 2020-09-14 RX ORDER — FLUTICASONE PROPIONATE 50 MCG
2 SPRAY, SUSPENSION (ML) NASAL DAILY
Qty: 1 BOTTLE | Refills: 5 | Status: SHIPPED
Start: 2020-09-14 | End: 2020-11-06 | Stop reason: SDUPTHER

## 2020-09-14 RX ORDER — PANTOPRAZOLE SODIUM 40 MG/1
40 TABLET, DELAYED RELEASE ORAL
Qty: 30 TABLET | Refills: 5 | Status: SHIPPED
Start: 2020-09-14 | End: 2020-11-30 | Stop reason: SDUPTHER

## 2020-09-14 RX ORDER — LEVOTHYROXINE SODIUM 0.03 MG/1
25 TABLET ORAL DAILY
Qty: 30 TABLET | Refills: 5 | Status: SHIPPED
Start: 2020-09-14 | End: 2020-11-27 | Stop reason: SDUPTHER

## 2020-09-16 ENCOUNTER — HOSPITAL ENCOUNTER (OUTPATIENT)
Age: 40
Discharge: HOME | End: 2020-09-16
Payer: MEDICARE

## 2020-09-16 LAB
ALBUMIN SERPL-MCNC: 4.6 G/DL (ref 3.5–5.2)
ALP BLD-CCNC: 91 U/L (ref 35–104)
ALT SERPL-CCNC: 23 U/L (ref 0–32)
ANION GAP SERPL CALCULATED.3IONS-SCNC: 9 MMOL/L (ref 7–16)
AST SERPL-CCNC: 21 U/L (ref 0–31)
BASOPHILS ABSOLUTE: 0.07 E9/L (ref 0–0.2)
BASOPHILS RELATIVE PERCENT: 1 % (ref 0–2)
BILIRUB SERPL-MCNC: 0.5 MG/DL (ref 0–1.2)
BUN BLDV-MCNC: 11 MG/DL (ref 6–20)
C-REACTIVE PROTEIN: 0.1 MG/DL (ref 0–0.4)
CALCIUM SERPL-MCNC: 9.9 MG/DL (ref 8.6–10.2)
CHLORIDE BLD-SCNC: 103 MMOL/L (ref 98–107)
CO2: 24 MMOL/L (ref 22–29)
CREAT SERPL-MCNC: 0.6 MG/DL (ref 0.5–1)
EOSINOPHILS ABSOLUTE: 0.24 E9/L (ref 0.05–0.5)
EOSINOPHILS RELATIVE PERCENT: 3.4 % (ref 0–6)
GFR AFRICAN AMERICAN: >60
GFR NON-AFRICAN AMERICAN: >60 ML/MIN/1.73
GLUCOSE BLD-MCNC: 127 MG/DL (ref 74–99)
HCT VFR BLD CALC: 44.4 % (ref 34–48)
HEMOGLOBIN: 15.1 G/DL (ref 11.5–15.5)
IMMATURE GRANULOCYTES #: 0.01 E9/L
IMMATURE GRANULOCYTES %: 0.1 % (ref 0–5)
LYMPHOCYTES ABSOLUTE: 3.01 E9/L (ref 1.5–4)
LYMPHOCYTES RELATIVE PERCENT: 43.2 % (ref 20–42)
MCH RBC QN AUTO: 29.8 PG (ref 26–35)
MCHC RBC AUTO-ENTMCNC: 34 % (ref 32–34.5)
MCV RBC AUTO: 87.6 FL (ref 80–99.9)
MONOCYTES ABSOLUTE: 0.49 E9/L (ref 0.1–0.95)
MONOCYTES RELATIVE PERCENT: 7 % (ref 2–12)
NEUTROPHILS ABSOLUTE: 3.15 E9/L (ref 1.8–7.3)
NEUTROPHILS RELATIVE PERCENT: 45.3 % (ref 43–80)
PDW BLD-RTO: 14.4 FL (ref 11.5–15)
PLATELET # BLD: 135 E9/L (ref 130–450)
PMV BLD AUTO: 12 FL (ref 7–12)
POTASSIUM SERPL-SCNC: 4.3 MMOL/L (ref 3.5–5)
RBC # BLD: 5.07 E12/L (ref 3.5–5.5)
SEDIMENTATION RATE, ERYTHROCYTE: 7 MM/HR (ref 0–20)
SODIUM BLD-SCNC: 136 MMOL/L (ref 132–146)
TOTAL PROTEIN: 8.7 G/DL (ref 6.4–8.3)
WBC # BLD: 7 E9/L (ref 4.5–11.5)

## 2020-09-16 PROCEDURE — 80053 COMPREHEN METABOLIC PANEL: CPT

## 2020-09-16 PROCEDURE — 36415 COLL VENOUS BLD VENIPUNCTURE: CPT

## 2020-09-16 PROCEDURE — 85651 RBC SED RATE NONAUTOMATED: CPT

## 2020-09-16 PROCEDURE — 85025 COMPLETE CBC W/AUTO DIFF WBC: CPT

## 2020-09-16 PROCEDURE — 86140 C-REACTIVE PROTEIN: CPT

## 2020-09-24 PROBLEM — M77.9 TENDONITIS: Status: ACTIVE | Noted: 2020-09-24

## 2020-09-28 RX ORDER — GLIMEPIRIDE 4 MG/1
4 TABLET ORAL
Qty: 30 TABLET | Refills: 3 | Status: SHIPPED
Start: 2020-09-28 | End: 2020-10-22 | Stop reason: SDUPTHER

## 2020-09-29 ENCOUNTER — OFFICE VISIT (OUTPATIENT)
Dept: PRIMARY CARE CLINIC | Age: 40
End: 2020-09-29
Payer: MEDICARE

## 2020-09-29 VITALS
TEMPERATURE: 97.9 F | BODY MASS INDEX: 27.29 KG/M2 | RESPIRATION RATE: 16 BRPM | OXYGEN SATURATION: 99 % | DIASTOLIC BLOOD PRESSURE: 84 MMHG | SYSTOLIC BLOOD PRESSURE: 132 MMHG | HEIGHT: 60 IN | WEIGHT: 139 LBS | HEART RATE: 88 BPM

## 2020-09-29 PROBLEM — K75.0 PYOGENIC LIVER ABSCESS: Status: RESOLVED | Noted: 2020-07-30 | Resolved: 2020-09-29

## 2020-09-29 PROBLEM — R65.10 SIRS (SYSTEMIC INFLAMMATORY RESPONSE SYNDROME) (HCC): Status: RESOLVED | Noted: 2020-06-29 | Resolved: 2020-09-29

## 2020-09-29 PROBLEM — K76.9 LIVER LESION: Status: RESOLVED | Noted: 2020-06-29 | Resolved: 2020-09-29

## 2020-09-29 PROBLEM — E03.9 ACQUIRED HYPOTHYROIDISM: Status: ACTIVE | Noted: 2020-09-29

## 2020-09-29 PROBLEM — A41.9 SEPSIS (HCC): Status: RESOLVED | Noted: 2020-06-29 | Resolved: 2020-09-29

## 2020-09-29 PROBLEM — E78.2 MIXED HYPERLIPIDEMIA: Status: ACTIVE | Noted: 2020-09-29

## 2020-09-29 LAB — DIABETIC RETINOPATHY: POSITIVE

## 2020-09-29 PROCEDURE — G8417 CALC BMI ABV UP PARAM F/U: HCPCS | Performed by: FAMILY MEDICINE

## 2020-09-29 PROCEDURE — 3046F HEMOGLOBIN A1C LEVEL >9.0%: CPT | Performed by: FAMILY MEDICINE

## 2020-09-29 PROCEDURE — 2022F DILAT RTA XM EVC RTNOPTHY: CPT | Performed by: FAMILY MEDICINE

## 2020-09-29 PROCEDURE — 99214 OFFICE O/P EST MOD 30 MIN: CPT | Performed by: FAMILY MEDICINE

## 2020-09-29 PROCEDURE — G8427 DOCREV CUR MEDS BY ELIG CLIN: HCPCS | Performed by: FAMILY MEDICINE

## 2020-09-29 PROCEDURE — 1036F TOBACCO NON-USER: CPT | Performed by: FAMILY MEDICINE

## 2020-09-29 RX ORDER — DULAGLUTIDE 0.75 MG/.5ML
0.75 INJECTION, SOLUTION SUBCUTANEOUS WEEKLY
Qty: 4 PEN | Refills: 3 | Status: SHIPPED
Start: 2020-09-29 | End: 2020-10-31 | Stop reason: SDUPTHER

## 2020-09-29 ASSESSMENT — ENCOUNTER SYMPTOMS
EYE REDNESS: 0
EYE ITCHING: 0
RHINORRHEA: 0
SHORTNESS OF BREATH: 0
NAUSEA: 0
EYE PAIN: 0
BLOOD IN STOOL: 0
CHEST TIGHTNESS: 0
ABDOMINAL PAIN: 1
BACK PAIN: 0
WHEEZING: 0
APNEA: 0
VOMITING: 0
COLOR CHANGE: 0
DIARRHEA: 0
BELCHING: 1
SORE THROAT: 0
SINUS PRESSURE: 0
CONSTIPATION: 0
COUGH: 0

## 2020-09-29 NOTE — PROGRESS NOTES
Chief Complaint:     Chief Complaint   Patient presents with    Hypothyroidism    Diabetes         Diabetes   She presents for her follow-up diabetic visit. She has type 2 diabetes mellitus. Her disease course has been stable. Hypoglycemia symptoms include headaches. Pertinent negatives for hypoglycemia include no dizziness or nervousness/anxiousness. Pertinent negatives for diabetes include no chest pain, no fatigue, no weakness and no weight loss. There are no hypoglycemic complications. Symptoms are stable. There are no diabetic complications. Risk factors for coronary artery disease include diabetes mellitus and dyslipidemia. Current diabetic treatment includes oral agent (monotherapy). She is compliant with treatment all of the time. Her weight is stable. When asked about meal planning, she reported none. There is no change in her home blood glucose trend. An ACE inhibitor/angiotensin II receptor blocker is not being taken. She does not see a podiatrist.Eye exam is not current. Hyperlipidemia   This is a chronic problem. The current episode started more than 1 month ago. The problem is controlled. Exacerbating diseases include diabetes and hypothyroidism. Pertinent negatives include no chest pain, myalgias or shortness of breath. Current antihyperlipidemic treatment includes statins. The current treatment provides significant improvement of lipids. There are no compliance problems. Risk factors for coronary artery disease include diabetes mellitus and dyslipidemia.        Patient Active Problem List   Diagnosis    Gastroesophageal reflux disease without esophagitis    Hiatal hernia    Carpal tunnel syndrome of right wrist    Type 2 diabetes mellitus without complication, without long-term current use of insulin (HCC)    Abdominal pain    Tendonitis    Acquired hypothyroidism    Mixed hyperlipidemia       Past Medical History:   Diagnosis Date    Carpal tunnel syndrome, right     Diabetes mellitus Social Needs    Financial resource strain: None    Food insecurity     Worry: None     Inability: None    Transportation needs     Medical: None     Non-medical: None   Tobacco Use    Smoking status: Never Smoker    Smokeless tobacco: Never Used   Substance and Sexual Activity    Alcohol use: Never     Frequency: Never    Drug use: Never    Sexual activity: None   Lifestyle    Physical activity     Days per week: None     Minutes per session: None    Stress: None   Relationships    Social connections     Talks on phone: None     Gets together: None     Attends Mormonism service: None     Active member of club or organization: None     Attends meetings of clubs or organizations: None     Relationship status: None    Intimate partner violence     Fear of current or ex partner: None     Emotionally abused: None     Physically abused: None     Forced sexual activity: None   Other Topics Concern    None   Social History Narrative    None       Family History   Problem Relation Age of Onset    Other Mother         aneurysm    Stroke Father     Heart Attack Father     No Known Problems Sister     Diabetes Brother     Heart Attack Brother 48    No Known Problems Sister           Review of Systems   Constitutional: Negative for activity change, appetite change, fatigue, fever and weight loss. HENT: Negative for congestion, ear pain, hearing loss, nosebleeds, rhinorrhea, sinus pressure and sore throat. Eyes: Negative for pain, redness, itching and visual disturbance. Respiratory: Negative for apnea, cough, chest tightness, shortness of breath and wheezing. Cardiovascular: Negative for chest pain, palpitations and leg swelling. Gastrointestinal: Positive for abdominal pain. Negative for blood in stool, constipation, diarrhea, nausea and vomiting. Endocrine: Negative. Genitourinary: Negative for decreased urine volume, difficulty urinating, dysuria, frequency, hematuria and urgency. Musculoskeletal: Negative for arthralgias, back pain, gait problem, myalgias and neck pain. Skin: Negative for color change and rash. Allergic/Immunologic: Negative for environmental allergies and food allergies. Neurological: Positive for headaches. Negative for dizziness, weakness, light-headedness and numbness. Hematological: Negative for adenopathy. Does not bruise/bleed easily. Psychiatric/Behavioral: Negative for behavioral problems, dysphoric mood and sleep disturbance. The patient is not nervous/anxious and is not hyperactive. All other systems reviewed and are negative. /84   Pulse 88   Temp 97.9 °F (36.6 °C)   Resp 16   Ht 5' (1.524 m)   Wt 139 lb (63 kg)   LMP 09/19/2020   SpO2 99%   BMI 27.15 kg/m²     Physical Exam  Vitals signs and nursing note reviewed. Constitutional:       General: She is not in acute distress. Appearance: Normal appearance. She is well-developed. HENT:      Head: Normocephalic and atraumatic. Right Ear: Hearing, tympanic membrane and external ear normal. No tenderness. No middle ear effusion. Left Ear: Hearing, tympanic membrane and external ear normal. No tenderness. No middle ear effusion. Nose: Nose normal. No congestion or rhinorrhea. Right Turbinates: Not enlarged. Left Turbinates: Not enlarged. Mouth/Throat:      Mouth: Mucous membranes are moist.      Tongue: No lesions. Pharynx: Oropharynx is clear. No oropharyngeal exudate or posterior oropharyngeal erythema. Eyes:      General: No scleral icterus. Conjunctiva/sclera: Conjunctivae normal.      Pupils: Pupils are equal, round, and reactive to light. Neck:      Musculoskeletal: Normal range of motion and neck supple. No neck rigidity or muscular tenderness. Thyroid: No thyromegaly. Cardiovascular:      Rate and Rhythm: Normal rate and regular rhythm. Heart sounds: Normal heart sounds. No murmur.    Pulmonary:      Effort: Recheck Meds. I spent 30 minutes with this patient. I spent greater than 50% of the time counseling this patient.         Buckley Goltz,   9/29/2020  12:01 PM

## 2020-10-02 ENCOUNTER — TELEPHONE (OUTPATIENT)
Dept: CARDIOLOGY | Age: 40
End: 2020-10-02

## 2020-10-09 ENCOUNTER — HOSPITAL ENCOUNTER (OUTPATIENT)
Age: 40
Discharge: HOME | End: 2020-10-11
Payer: MEDICARE

## 2020-10-09 LAB
ALBUMIN SERPL-MCNC: 4.4 G/DL (ref 3.5–5.2)
ALP BLD-CCNC: 88 U/L (ref 35–104)
ALT SERPL-CCNC: 35 U/L (ref 0–32)
ANION GAP SERPL CALCULATED.3IONS-SCNC: 16 MMOL/L (ref 7–16)
AST SERPL-CCNC: 26 U/L (ref 0–31)
BILIRUB SERPL-MCNC: 0.4 MG/DL (ref 0–1.2)
BUN BLDV-MCNC: 14 MG/DL (ref 6–20)
CALCIUM SERPL-MCNC: 9.6 MG/DL (ref 8.6–10.2)
CHLORIDE BLD-SCNC: 105 MMOL/L (ref 98–107)
CHOLESTEROL, TOTAL: 290 MG/DL (ref 0–199)
CO2: 21 MMOL/L (ref 22–29)
CREAT SERPL-MCNC: 0.6 MG/DL (ref 0.5–1)
CREATININE URINE: 66 MG/DL (ref 29–226)
GFR AFRICAN AMERICAN: >60
GFR NON-AFRICAN AMERICAN: >60 ML/MIN/1.73
GLUCOSE BLD-MCNC: 160 MG/DL (ref 74–99)
HBA1C MFR BLD: 8 % (ref 4–5.6)
HDLC SERPL-MCNC: 54 MG/DL
LDL CHOLESTEROL CALCULATED: 207 MG/DL (ref 0–99)
MICROALBUMIN UR-MCNC: 17.7 MG/L
MICROALBUMIN/CREAT UR-RTO: 26.8 (ref 0–30)
POTASSIUM SERPL-SCNC: 4.2 MMOL/L (ref 3.5–5)
SODIUM BLD-SCNC: 142 MMOL/L (ref 132–146)
TOTAL PROTEIN: 7.9 G/DL (ref 6.4–8.3)
TRIGL SERPL-MCNC: 147 MG/DL (ref 0–149)
TSH SERPL DL<=0.05 MIU/L-ACNC: 4.56 UIU/ML (ref 0.27–4.2)
VLDLC SERPL CALC-MCNC: 29 MG/DL

## 2020-10-09 PROCEDURE — 84443 ASSAY THYROID STIM HORMONE: CPT

## 2020-10-09 PROCEDURE — 36415 COLL VENOUS BLD VENIPUNCTURE: CPT

## 2020-10-09 PROCEDURE — 82044 UR ALBUMIN SEMIQUANTITATIVE: CPT

## 2020-10-09 PROCEDURE — 80053 COMPREHEN METABOLIC PANEL: CPT

## 2020-10-09 PROCEDURE — 80061 LIPID PANEL: CPT

## 2020-10-09 PROCEDURE — 82570 ASSAY OF URINE CREATININE: CPT

## 2020-10-09 PROCEDURE — 83036 HEMOGLOBIN GLYCOSYLATED A1C: CPT

## 2020-10-22 RX ORDER — GLIMEPIRIDE 4 MG/1
4 TABLET ORAL
Qty: 30 TABLET | Refills: 3 | Status: SHIPPED
Start: 2020-10-22 | End: 2020-11-27 | Stop reason: SDUPTHER

## 2020-10-22 RX ORDER — LANCETS 28 GAUGE
1 EACH MISCELLANEOUS DAILY
Qty: 100 EACH | Refills: 3 | Status: SHIPPED
Start: 2020-10-22 | End: 2021-04-02

## 2020-10-22 RX ORDER — BLOOD-GLUCOSE METER
1 KIT MISCELLANEOUS DAILY
Qty: 100 EACH | Refills: 3 | Status: SHIPPED
Start: 2020-10-22 | End: 2021-01-11 | Stop reason: SDUPTHER

## 2020-10-23 ENCOUNTER — TELEPHONE (OUTPATIENT)
Dept: PRIMARY CARE CLINIC | Age: 40
End: 2020-10-23

## 2020-10-23 RX ORDER — BLOOD-GLUCOSE METER
1 EACH MISCELLANEOUS DAILY
Qty: 1 KIT | Refills: 0 | Status: SHIPPED
Start: 2020-10-23 | End: 2021-10-18 | Stop reason: SDUPTHER

## 2020-10-23 RX ORDER — BLOOD-GLUCOSE METER
EACH MISCELLANEOUS
Qty: 100 EACH | Refills: 1 | Status: SHIPPED
Start: 2020-10-23 | End: 2020-12-27 | Stop reason: SDUPTHER

## 2020-10-23 RX ORDER — BLOOD SUGAR DIAGNOSTIC
1 STRIP MISCELLANEOUS DAILY
Qty: 100 EACH | Refills: 3 | Status: SHIPPED
Start: 2020-10-23 | End: 2020-12-27 | Stop reason: SDUPTHER

## 2020-10-23 NOTE — TELEPHONE ENCOUNTER
Please send over all new testing supplies for the one touch brand. insurance not covering the freestyle. Pt already notified.

## 2020-11-02 RX ORDER — DULAGLUTIDE 0.75 MG/.5ML
0.75 INJECTION, SOLUTION SUBCUTANEOUS WEEKLY
Qty: 4 PEN | Refills: 3 | Status: SHIPPED
Start: 2020-11-02 | End: 2020-11-27 | Stop reason: SDUPTHER

## 2020-11-05 ENCOUNTER — TELEPHONE (OUTPATIENT)
Dept: CARDIOLOGY | Age: 40
End: 2020-11-05

## 2020-11-05 NOTE — TELEPHONE ENCOUNTER
Echocardiogram scheduled. Reviewed Covid-19 checklist with the patient.     Electronically signed by Izabela Elise on 11/5/2020 at 10:12 AM

## 2020-11-06 RX ORDER — FLUTICASONE PROPIONATE 50 MCG
2 SPRAY, SUSPENSION (ML) NASAL DAILY
Qty: 1 BOTTLE | Refills: 5 | Status: SHIPPED
Start: 2020-11-06 | End: 2020-11-30 | Stop reason: SDUPTHER

## 2020-11-06 RX ORDER — ATORVASTATIN CALCIUM 20 MG/1
20 TABLET, FILM COATED ORAL DAILY
Qty: 30 TABLET | Refills: 5 | Status: SHIPPED
Start: 2020-11-06 | End: 2021-01-11 | Stop reason: SDUPTHER

## 2020-11-20 ENCOUNTER — HOSPITAL ENCOUNTER (OUTPATIENT)
Dept: CARDIOLOGY | Age: 40
Discharge: HOME | End: 2020-11-20
Payer: MEDICARE

## 2020-11-20 LAB
LV EF: 55 %
LVEF MODALITY: NORMAL

## 2020-11-20 PROCEDURE — 93306 TTE W/DOPPLER COMPLETE: CPT | Performed by: PSYCHIATRY & NEUROLOGY

## 2020-11-27 RX ORDER — DULAGLUTIDE 0.75 MG/.5ML
0.75 INJECTION, SOLUTION SUBCUTANEOUS WEEKLY
Qty: 4 PEN | Refills: 3 | Status: SHIPPED
Start: 2020-11-27 | End: 2020-12-27 | Stop reason: SDUPTHER

## 2020-11-27 RX ORDER — GLIMEPIRIDE 4 MG/1
4 TABLET ORAL
Qty: 30 TABLET | Refills: 3 | Status: SHIPPED
Start: 2020-11-27 | End: 2020-12-27 | Stop reason: SDUPTHER

## 2020-11-27 RX ORDER — LEVOTHYROXINE SODIUM 0.03 MG/1
25 TABLET ORAL DAILY
Qty: 30 TABLET | Refills: 5 | Status: SHIPPED
Start: 2020-11-27 | End: 2021-01-11 | Stop reason: SDUPTHER

## 2020-11-30 ENCOUNTER — TELEPHONE (OUTPATIENT)
Dept: CARDIOLOGY CLINIC | Age: 40
End: 2020-11-30

## 2020-12-01 RX ORDER — FLUTICASONE PROPIONATE 50 MCG
2 SPRAY, SUSPENSION (ML) NASAL DAILY
Qty: 1 BOTTLE | Refills: 5 | Status: SHIPPED
Start: 2020-12-01 | End: 2020-12-27 | Stop reason: SDUPTHER

## 2020-12-01 RX ORDER — PANTOPRAZOLE SODIUM 40 MG/1
40 TABLET, DELAYED RELEASE ORAL
Qty: 30 TABLET | Refills: 5 | Status: SHIPPED
Start: 2020-12-01 | End: 2021-01-26 | Stop reason: SDUPTHER

## 2020-12-28 RX ORDER — BLOOD-GLUCOSE METER
EACH MISCELLANEOUS
Qty: 100 EACH | Refills: 1 | Status: SHIPPED
Start: 2020-12-28 | End: 2021-03-15 | Stop reason: SDUPTHER

## 2020-12-28 RX ORDER — BLOOD SUGAR DIAGNOSTIC
1 STRIP MISCELLANEOUS DAILY
Qty: 100 EACH | Refills: 3 | Status: SHIPPED
Start: 2020-12-28 | End: 2021-01-14 | Stop reason: SDUPTHER

## 2020-12-28 RX ORDER — FLUTICASONE PROPIONATE 50 MCG
2 SPRAY, SUSPENSION (ML) NASAL DAILY
Qty: 1 BOTTLE | Refills: 5 | Status: SHIPPED
Start: 2020-12-28 | End: 2021-05-26 | Stop reason: SDUPTHER

## 2020-12-28 RX ORDER — DULAGLUTIDE 0.75 MG/.5ML
0.75 INJECTION, SOLUTION SUBCUTANEOUS WEEKLY
Qty: 4 PEN | Refills: 3 | Status: SHIPPED
Start: 2020-12-28 | End: 2021-01-25 | Stop reason: SDUPTHER

## 2020-12-28 RX ORDER — GLIMEPIRIDE 4 MG/1
4 TABLET ORAL
Qty: 30 TABLET | Refills: 3 | Status: SHIPPED
Start: 2020-12-28 | End: 2021-01-25 | Stop reason: SDUPTHER

## 2021-01-11 DIAGNOSIS — E11.9 TYPE 2 DIABETES MELLITUS WITHOUT COMPLICATION, WITHOUT LONG-TERM CURRENT USE OF INSULIN (HCC): ICD-10-CM

## 2021-01-11 RX ORDER — LEVOTHYROXINE SODIUM 0.03 MG/1
25 TABLET ORAL DAILY
Qty: 30 TABLET | Refills: 5 | Status: SHIPPED
Start: 2021-01-11 | End: 2021-02-22 | Stop reason: SDUPTHER

## 2021-01-11 RX ORDER — ATORVASTATIN CALCIUM 20 MG/1
20 TABLET, FILM COATED ORAL DAILY
Qty: 30 TABLET | Refills: 5 | Status: SHIPPED
Start: 2021-01-11 | End: 2021-05-26 | Stop reason: SDUPTHER

## 2021-01-11 RX ORDER — BLOOD-GLUCOSE METER
1 KIT MISCELLANEOUS DAILY
Qty: 100 EACH | Refills: 3 | Status: SHIPPED
Start: 2021-01-11 | End: 2021-10-08

## 2021-01-14 DIAGNOSIS — E11.9 TYPE 2 DIABETES MELLITUS WITHOUT COMPLICATION, WITHOUT LONG-TERM CURRENT USE OF INSULIN (HCC): ICD-10-CM

## 2021-01-14 RX ORDER — BLOOD SUGAR DIAGNOSTIC
1 STRIP MISCELLANEOUS DAILY
Qty: 100 EACH | Refills: 3 | Status: SHIPPED
Start: 2021-01-14 | End: 2021-03-15 | Stop reason: SDUPTHER

## 2021-01-25 RX ORDER — DULAGLUTIDE 0.75 MG/.5ML
0.75 INJECTION, SOLUTION SUBCUTANEOUS WEEKLY
Qty: 4 PEN | Refills: 3 | Status: SHIPPED
Start: 2021-01-25 | End: 2021-02-22 | Stop reason: SDUPTHER

## 2021-01-25 RX ORDER — GLIMEPIRIDE 4 MG/1
4 TABLET ORAL
Qty: 30 TABLET | Refills: 3 | Status: SHIPPED
Start: 2021-01-25 | End: 2021-02-22 | Stop reason: SDUPTHER

## 2021-01-26 RX ORDER — PANTOPRAZOLE SODIUM 40 MG/1
40 TABLET, DELAYED RELEASE ORAL
Qty: 30 TABLET | Refills: 5 | Status: SHIPPED
Start: 2021-01-26 | End: 2021-02-22 | Stop reason: SDUPTHER

## 2021-02-22 RX ORDER — GLIMEPIRIDE 4 MG/1
4 TABLET ORAL
Qty: 30 TABLET | Refills: 3 | Status: SHIPPED
Start: 2021-02-22 | End: 2021-03-19 | Stop reason: SDUPTHER

## 2021-02-22 RX ORDER — PANTOPRAZOLE SODIUM 40 MG/1
40 TABLET, DELAYED RELEASE ORAL
Qty: 30 TABLET | Refills: 5 | Status: SHIPPED
Start: 2021-02-22 | End: 2021-04-02

## 2021-02-22 RX ORDER — DULAGLUTIDE 0.75 MG/.5ML
0.75 INJECTION, SOLUTION SUBCUTANEOUS WEEKLY
Qty: 4 PEN | Refills: 3 | Status: SHIPPED
Start: 2021-02-22 | End: 2021-03-19 | Stop reason: SDUPTHER

## 2021-02-22 RX ORDER — LEVOTHYROXINE SODIUM 0.03 MG/1
25 TABLET ORAL DAILY
Qty: 30 TABLET | Refills: 5 | Status: SHIPPED
Start: 2021-02-22 | End: 2021-04-07 | Stop reason: SDUPTHER

## 2021-03-11 NOTE — PROGRESS NOTES
[address] [facility], [place]                Phone: [phone]  Fax: [phone]     Occupational Therapy  Out Patient Discharge Summary     Date:  3/11/2021    Patient Name:  Roger Gallardo   :  1980 MRN: [id number]     Restrictions/Precautions: Right hand ROM as tolerated, modalities and edema control prn , low fall risk  Diagnosis:G56.03 (ICD-10-CM) - Bilateral carpal tunnel syndrome                                                              Insurance/Certification information: Medical mutual   Referring Practitioner:  Dr. Kristy Trinidad  Date of Surgery/Injury: 2020   (10 weeks post op)  Plan of care signed (Y/N):  N  Visit# / total visits:      FINAL STATUS:  Pt was seen for  and did not continue due to health issues. Pt was re-assessed after initial 12 visit POC and 5 additional sessions were recommended because of continued pain and to increase strength. Pt did no present for remainder of the sessions due to reported health issues. Pt will require a new script to continue with skilled therapy services at this time. R Upper Extremity ROM       AROM [x]? ?     PROM[]? ?  IE 06/15/2020           WRIST Flexion 60-80* 63*  80*         Extension 60-75* 40*  60*         Radial Deviation 0-20* 20*  21*         Ulnar Deviation 30-35* 30*  50*          Comment: Hand Dominance is R     Sensation: R hand  SEMMES-ANNMARIE Median N Ulnar N Radial N Other   Normal Touch  Size: 2.83           Diminished Light Touch   Size: 3.61 x x x     Diminished Protective Sense  Size: 4.31           Loss of Protective Sense   Size: 4.56           Loss of Sensation  Size: 6.65              Edema Description/Circumferential Measurements:              Moderate edema at thenar eminance  Dynamometer (setting 2): pain when completing 6/10                               Left: 35#, 38 #                                               Right: 5#, 24 #                      Pinch Meter: Pt was issued and educated on HEP, pain management, scar management and functional use affected UE. RECOMMENDATIONS: Discharge from skilled OT services. Thank you for the opportunity to work with your patient. If you have questions or comments, please feel free to contact us by phone or fax.     Electronically Signed by: Alec Nicholas [address] 86, OTR/L #[id number]  3/11/2021

## 2021-03-15 DIAGNOSIS — E11.9 TYPE 2 DIABETES MELLITUS WITHOUT COMPLICATION, WITHOUT LONG-TERM CURRENT USE OF INSULIN (HCC): ICD-10-CM

## 2021-03-15 RX ORDER — BLOOD SUGAR DIAGNOSTIC
1 STRIP MISCELLANEOUS DAILY
Qty: 100 EACH | Refills: 3 | Status: SHIPPED
Start: 2021-03-15 | End: 2021-04-02 | Stop reason: SDUPTHER

## 2021-03-15 RX ORDER — BLOOD-GLUCOSE METER
EACH MISCELLANEOUS
Qty: 100 EACH | Refills: 1 | Status: SHIPPED
Start: 2021-03-15 | End: 2021-05-26 | Stop reason: SDUPTHER

## 2021-03-20 RX ORDER — DULAGLUTIDE 0.75 MG/.5ML
0.75 INJECTION, SOLUTION SUBCUTANEOUS WEEKLY
Qty: 4 PEN | Refills: 3 | Status: SHIPPED
Start: 2021-03-20 | End: 2021-04-27 | Stop reason: SDUPTHER

## 2021-03-20 RX ORDER — GLIMEPIRIDE 4 MG/1
4 TABLET ORAL
Qty: 30 TABLET | Refills: 3 | Status: SHIPPED
Start: 2021-03-20 | End: 2021-04-27 | Stop reason: SDUPTHER

## 2021-04-02 ENCOUNTER — OFFICE VISIT (OUTPATIENT)
Dept: PRIMARY CARE CLINIC | Age: 41
End: 2021-04-02
Payer: MEDICARE

## 2021-04-02 VITALS
RESPIRATION RATE: 16 BRPM | OXYGEN SATURATION: 98 % | BODY MASS INDEX: 30.23 KG/M2 | SYSTOLIC BLOOD PRESSURE: 118 MMHG | HEIGHT: 60 IN | HEART RATE: 98 BPM | DIASTOLIC BLOOD PRESSURE: 70 MMHG | TEMPERATURE: 97.1 F | WEIGHT: 154 LBS

## 2021-04-02 DIAGNOSIS — K21.9 GASTROESOPHAGEAL REFLUX DISEASE WITHOUT ESOPHAGITIS: ICD-10-CM

## 2021-04-02 DIAGNOSIS — E11.9 TYPE 2 DIABETES MELLITUS WITHOUT COMPLICATION, WITHOUT LONG-TERM CURRENT USE OF INSULIN (HCC): ICD-10-CM

## 2021-04-02 DIAGNOSIS — E03.9 ACQUIRED HYPOTHYROIDISM: Primary | ICD-10-CM

## 2021-04-02 DIAGNOSIS — E78.2 MIXED HYPERLIPIDEMIA: ICD-10-CM

## 2021-04-02 DIAGNOSIS — H34.9 RETINAL ARTERY OCCLUSION: ICD-10-CM

## 2021-04-02 PROBLEM — R10.9 ABDOMINAL PAIN: Status: RESOLVED | Noted: 2020-06-26 | Resolved: 2021-04-02

## 2021-04-02 PROCEDURE — 1036F TOBACCO NON-USER: CPT | Performed by: FAMILY MEDICINE

## 2021-04-02 PROCEDURE — 99214 OFFICE O/P EST MOD 30 MIN: CPT | Performed by: FAMILY MEDICINE

## 2021-04-02 PROCEDURE — 2022F DILAT RTA XM EVC RTNOPTHY: CPT | Performed by: FAMILY MEDICINE

## 2021-04-02 PROCEDURE — G8427 DOCREV CUR MEDS BY ELIG CLIN: HCPCS | Performed by: FAMILY MEDICINE

## 2021-04-02 PROCEDURE — 3046F HEMOGLOBIN A1C LEVEL >9.0%: CPT | Performed by: FAMILY MEDICINE

## 2021-04-02 PROCEDURE — G8417 CALC BMI ABV UP PARAM F/U: HCPCS | Performed by: FAMILY MEDICINE

## 2021-04-02 ASSESSMENT — ENCOUNTER SYMPTOMS
EYE ITCHING: 0
EYE REDNESS: 0
EYE PAIN: 0
BACK PAIN: 0
APNEA: 0
DIARRHEA: 0
RHINORRHEA: 0
CONSTIPATION: 0
COUGH: 0
ABDOMINAL PAIN: 0
BLOOD IN STOOL: 0
SORE THROAT: 0
NAUSEA: 0
SINUS PRESSURE: 0
WHEEZING: 0
CHEST TIGHTNESS: 0
SHORTNESS OF BREATH: 0
VOMITING: 0
COLOR CHANGE: 0

## 2021-04-02 ASSESSMENT — PATIENT HEALTH QUESTIONNAIRE - PHQ9
SUM OF ALL RESPONSES TO PHQ QUESTIONS 1-9: 0
SUM OF ALL RESPONSES TO PHQ QUESTIONS 1-9: 0
1. LITTLE INTEREST OR PLEASURE IN DOING THINGS: 0

## 2021-04-02 NOTE — PROGRESS NOTES
Chief Complaint:     Chief Complaint   Patient presents with    Hypotension     pt said her bp has been low 86/60,     Diabetes    Hyperlipidemia    Hypothyroidism         Diabetes  She presents for her follow-up diabetic visit. She has type 2 diabetes mellitus. Her disease course has been stable. Hypoglycemia symptoms include headaches. Pertinent negatives for hypoglycemia include no dizziness or nervousness/anxiousness. Pertinent negatives for diabetes include no chest pain, no fatigue, no weakness and no weight loss. There are no hypoglycemic complications. Symptoms are stable. There are no diabetic complications. Risk factors for coronary artery disease include diabetes mellitus and dyslipidemia. Current diabetic treatment includes oral agent (monotherapy). She is compliant with treatment all of the time. Her weight is stable. When asked about meal planning, she reported none. There is no change in her home blood glucose trend. An ACE inhibitor/angiotensin II receptor blocker is not being taken. She does not see a podiatrist.Eye exam is not current. Hyperlipidemia  This is a chronic problem. The current episode started more than 1 month ago. The problem is controlled. Exacerbating diseases include diabetes and hypothyroidism. Pertinent negatives include no chest pain, myalgias or shortness of breath. Current antihyperlipidemic treatment includes statins. The current treatment provides significant improvement of lipids. There are no compliance problems. Risk factors for coronary artery disease include diabetes mellitus and dyslipidemia. Other  This is a recurrent problem. The current episode started more than 1 month ago. The problem occurs intermittently. The problem has been waxing and waning. Associated symptoms include headaches.  Pertinent negatives include no abdominal pain, arthralgias, chest pain, congestion, coughing, fatigue, fever, myalgias, nausea, neck pain, numbness, rash, sore throat, Glucose Monitoring Suppl (FREESTYLE FREEDOM LITE) w/Device KIT 1 kit by Does not apply route daily 1 kit 0     No current facility-administered medications for this visit. Allergies   Allergen Reactions    Iodine Anaphylaxis    Shellfish-Derived Products Anaphylaxis, Hives, Itching and Rash       Social History     Socioeconomic History    Marital status:      Spouse name: None    Number of children: None    Years of education: None    Highest education level: None   Occupational History    None   Social Needs    Financial resource strain: None    Food insecurity     Worry: None     Inability: None    Transportation needs     Medical: None     Non-medical: None   Tobacco Use    Smoking status: Never Smoker    Smokeless tobacco: Never Used   Substance and Sexual Activity    Alcohol use: Never     Frequency: Never    Drug use: Never    Sexual activity: None   Lifestyle    Physical activity     Days per week: None     Minutes per session: None    Stress: None   Relationships    Social connections     Talks on phone: None     Gets together: None     Attends Zoroastrian service: None     Active member of club or organization: None     Attends meetings of clubs or organizations: None     Relationship status: None    Intimate partner violence     Fear of current or ex partner: None     Emotionally abused: None     Physically abused: None     Forced sexual activity: None   Other Topics Concern    None   Social History Narrative    None       Family History   Problem Relation Age of Onset    Other Mother         aneurysm    Stroke Father     Heart Attack Father     No Known Problems Sister     Diabetes Brother     Heart Attack Brother 48    No Known Problems Sister           Review of Systems   Constitutional: Negative for activity change, appetite change, fatigue, fever and weight loss.    HENT: Negative for congestion, ear pain, hearing loss, nosebleeds, rhinorrhea, sinus pressure and sore throat. Eyes: Negative for pain, redness, itching and visual disturbance. Respiratory: Negative for apnea, cough, chest tightness, shortness of breath and wheezing. Cardiovascular: Negative for chest pain, palpitations and leg swelling. Gastrointestinal: Negative for abdominal pain, blood in stool, constipation, diarrhea, nausea and vomiting. Endocrine: Negative. Genitourinary: Negative for decreased urine volume, difficulty urinating, dysuria, frequency, hematuria and urgency. Musculoskeletal: Negative for arthralgias, back pain, gait problem, myalgias and neck pain. Skin: Negative for color change and rash. Allergic/Immunologic: Negative for environmental allergies and food allergies. Neurological: Positive for headaches. Negative for dizziness, weakness, light-headedness and numbness. Hematological: Negative for adenopathy. Does not bruise/bleed easily. Psychiatric/Behavioral: Negative for behavioral problems, dysphoric mood and sleep disturbance. The patient is not nervous/anxious and is not hyperactive. All other systems reviewed and are negative. /70   Pulse 98   Temp 97.1 °F (36.2 °C)   Resp 16   Ht 5' (1.524 m)   Wt 154 lb (69.9 kg)   SpO2 98%   BMI 30.08 kg/m²     Physical Exam  Vitals signs and nursing note reviewed. Constitutional:       General: She is not in acute distress. Appearance: Normal appearance. She is well-developed. HENT:      Head: Normocephalic and atraumatic. Right Ear: Hearing, tympanic membrane and external ear normal. No tenderness. No middle ear effusion. Left Ear: Hearing, tympanic membrane and external ear normal. No tenderness. No middle ear effusion. Nose: Nose normal. No congestion or rhinorrhea. Right Turbinates: Not enlarged. Left Turbinates: Not enlarged. Mouth/Throat:      Mouth: Mucous membranes are moist.      Tongue: No lesions. Pharynx: Oropharynx is clear.  No oropharyngeal exudate or posterior oropharyngeal erythema. Eyes:      General: No scleral icterus. Conjunctiva/sclera: Conjunctivae normal.      Pupils: Pupils are equal, round, and reactive to light. Neck:      Musculoskeletal: Normal range of motion and neck supple. No neck rigidity or muscular tenderness. Thyroid: No thyromegaly. Cardiovascular:      Rate and Rhythm: Normal rate and regular rhythm. Heart sounds: Normal heart sounds. No murmur. Pulmonary:      Effort: Pulmonary effort is normal. No respiratory distress. Breath sounds: Normal breath sounds. No wheezing or rales. Abdominal:      General: Bowel sounds are normal. There is no distension. Palpations: Abdomen is soft. Tenderness: There is no abdominal tenderness. Musculoskeletal: Normal range of motion. General: No tenderness. Lymphadenopathy:      Cervical: No cervical adenopathy. Skin:     General: Skin is warm and dry. Findings: No erythema or rash. Neurological:      General: No focal deficit present. Mental Status: She is alert and oriented to person, place, and time. Cranial Nerves: No cranial nerve deficit. Deep Tendon Reflexes: Reflexes are normal and symmetric. Reflexes normal.   Psychiatric:         Mood and Affect: Mood normal.                                 ASSESSMENT/PLAN:    Patient Active Problem List   Diagnosis    Gastroesophageal reflux disease without esophagitis    Hiatal hernia    Carpal tunnel syndrome of right wrist    Type 2 diabetes mellitus without complication, without long-term current use of insulin ([address]    Tendonitis    Acquired hypothyroidism    Mixed hyperlipidemia    Retinal artery occlusion       Mckeon was seen today for hypotension, diabetes, hyperlipidemia and hypothyroidism. Diagnoses and all orders for this visit:    Acquired hypothyroidism  -     TSH without Reflex;  Future    Type 2 diabetes mellitus without complication, without long-term

## 2021-04-05 RX ORDER — BLOOD SUGAR DIAGNOSTIC
1 STRIP MISCELLANEOUS DAILY
Qty: 100 EACH | Refills: 3 | Status: SHIPPED
Start: 2021-04-05 | End: 2021-04-27 | Stop reason: SDUPTHER

## 2021-04-06 DIAGNOSIS — E78.2 MIXED HYPERLIPIDEMIA: ICD-10-CM

## 2021-04-06 DIAGNOSIS — E11.9 TYPE 2 DIABETES MELLITUS WITHOUT COMPLICATION, WITHOUT LONG-TERM CURRENT USE OF INSULIN (HCC): ICD-10-CM

## 2021-04-06 DIAGNOSIS — E03.9 ACQUIRED HYPOTHYROIDISM: ICD-10-CM

## 2021-04-06 LAB
ALBUMIN SERPL-MCNC: 4.5 G/DL (ref 3.5–5.2)
ALP BLD-CCNC: 62 U/L (ref 35–104)
ALT SERPL-CCNC: 20 U/L (ref 0–32)
ANION GAP SERPL CALCULATED.3IONS-SCNC: 12 MMOL/L (ref 7–16)
AST SERPL-CCNC: 19 U/L (ref 0–31)
BILIRUB SERPL-MCNC: 0.3 MG/DL (ref 0–1.2)
BUN BLDV-MCNC: 12 MG/DL (ref 6–20)
CALCIUM SERPL-MCNC: 9.6 MG/DL (ref 8.6–10.2)
CHLORIDE BLD-SCNC: 105 MMOL/L (ref 98–107)
CHOLESTEROL, TOTAL: 185 MG/DL (ref 0–199)
CO2: 24 MMOL/L (ref 22–29)
CREAT SERPL-MCNC: 0.6 MG/DL (ref 0.5–1)
GFR AFRICAN AMERICAN: >60
GFR NON-AFRICAN AMERICAN: >60 ML/MIN/1.73
GLUCOSE BLD-MCNC: 98 MG/DL (ref 74–99)
HBA1C MFR BLD: 7.5 % (ref 4–5.6)
HCT VFR BLD CALC: 40 % (ref 34–48)
HDLC SERPL-MCNC: 50 MG/DL
HEMOGLOBIN: 13.3 G/DL (ref 11.5–15.5)
LDL CHOLESTEROL CALCULATED: 102 MG/DL (ref 0–99)
MCH RBC QN AUTO: 30.2 PG (ref 26–35)
MCHC RBC AUTO-ENTMCNC: 33.3 % (ref 32–34.5)
MCV RBC AUTO: 90.9 FL (ref 80–99.9)
PDW BLD-RTO: 12.5 FL (ref 11.5–15)
PLATELET # BLD: 192 E9/L (ref 130–450)
PMV BLD AUTO: 12 FL (ref 7–12)
POTASSIUM SERPL-SCNC: 4 MMOL/L (ref 3.5–5)
RBC # BLD: 4.4 E12/L (ref 3.5–5.5)
SODIUM BLD-SCNC: 141 MMOL/L (ref 132–146)
TOTAL PROTEIN: 7.9 G/DL (ref 6.4–8.3)
TRIGL SERPL-MCNC: 167 MG/DL (ref 0–149)
TSH SERPL DL<=0.05 MIU/L-ACNC: 5.26 UIU/ML (ref 0.27–4.2)
VLDLC SERPL CALC-MCNC: 33 MG/DL
WBC # BLD: 7.7 E9/L (ref 4.5–11.5)

## 2021-04-07 DIAGNOSIS — E03.9 ACQUIRED HYPOTHYROIDISM: Primary | ICD-10-CM

## 2021-04-07 RX ORDER — LEVOTHYROXINE SODIUM 0.05 MG/1
50 TABLET ORAL DAILY
Qty: 30 TABLET | Refills: 5 | Status: SHIPPED
Start: 2021-04-07 | End: 2021-05-26 | Stop reason: SDUPTHER

## 2021-04-27 DIAGNOSIS — E11.9 TYPE 2 DIABETES MELLITUS WITHOUT COMPLICATION, WITHOUT LONG-TERM CURRENT USE OF INSULIN (HCC): ICD-10-CM

## 2021-04-27 RX ORDER — GLIMEPIRIDE 4 MG/1
4 TABLET ORAL
Qty: 30 TABLET | Refills: 3 | Status: SHIPPED
Start: 2021-04-27 | End: 2021-05-26 | Stop reason: SDUPTHER

## 2021-04-27 RX ORDER — BLOOD SUGAR DIAGNOSTIC
1 STRIP MISCELLANEOUS DAILY
Qty: 100 EACH | Refills: 3 | Status: SHIPPED
Start: 2021-04-27 | End: 2021-05-17 | Stop reason: SDUPTHER

## 2021-04-27 RX ORDER — DULAGLUTIDE 0.75 MG/.5ML
0.75 INJECTION, SOLUTION SUBCUTANEOUS WEEKLY
Qty: 4 PEN | Refills: 3 | Status: SHIPPED
Start: 2021-04-27 | End: 2021-05-26 | Stop reason: SDUPTHER

## 2021-05-17 DIAGNOSIS — E11.9 TYPE 2 DIABETES MELLITUS WITHOUT COMPLICATION, WITHOUT LONG-TERM CURRENT USE OF INSULIN (HCC): ICD-10-CM

## 2021-05-18 RX ORDER — BLOOD SUGAR DIAGNOSTIC
1 STRIP MISCELLANEOUS DAILY
Qty: 100 EACH | Refills: 3 | Status: SHIPPED
Start: 2021-05-18 | End: 2021-06-09 | Stop reason: SDUPTHER

## 2021-05-26 DIAGNOSIS — E11.9 TYPE 2 DIABETES MELLITUS WITHOUT COMPLICATION, WITHOUT LONG-TERM CURRENT USE OF INSULIN (HCC): ICD-10-CM

## 2021-05-26 RX ORDER — FLUTICASONE PROPIONATE 50 MCG
2 SPRAY, SUSPENSION (ML) NASAL DAILY
Qty: 1 BOTTLE | Refills: 5 | Status: SHIPPED
Start: 2021-05-26 | End: 2021-07-22 | Stop reason: SDUPTHER

## 2021-05-26 RX ORDER — GLIMEPIRIDE 4 MG/1
4 TABLET ORAL
Qty: 30 TABLET | Refills: 3 | Status: SHIPPED
Start: 2021-05-26 | End: 2021-07-22 | Stop reason: SDUPTHER

## 2021-05-26 RX ORDER — BLOOD-GLUCOSE METER
EACH MISCELLANEOUS
Qty: 100 EACH | Refills: 1 | Status: SHIPPED
Start: 2021-05-26 | End: 2021-06-08 | Stop reason: SDUPTHER

## 2021-05-26 RX ORDER — LEVOTHYROXINE SODIUM 0.05 MG/1
50 TABLET ORAL DAILY
Qty: 30 TABLET | Refills: 5 | Status: SHIPPED
Start: 2021-05-26 | End: 2021-10-18 | Stop reason: SDUPTHER

## 2021-05-26 RX ORDER — ATORVASTATIN CALCIUM 20 MG/1
20 TABLET, FILM COATED ORAL DAILY
Qty: 30 TABLET | Refills: 5 | Status: SHIPPED
Start: 2021-05-26 | End: 2021-10-18 | Stop reason: SDUPTHER

## 2021-05-26 RX ORDER — DULAGLUTIDE 0.75 MG/.5ML
0.75 INJECTION, SOLUTION SUBCUTANEOUS WEEKLY
Qty: 4 PEN | Refills: 3 | Status: SHIPPED
Start: 2021-05-26 | End: 2021-06-24 | Stop reason: SDUPTHER

## 2021-06-08 DIAGNOSIS — E11.9 TYPE 2 DIABETES MELLITUS WITHOUT COMPLICATION, WITHOUT LONG-TERM CURRENT USE OF INSULIN (HCC): ICD-10-CM

## 2021-06-08 RX ORDER — BLOOD-GLUCOSE METER
EACH MISCELLANEOUS
Qty: 100 EACH | Refills: 1 | Status: SHIPPED
Start: 2021-06-08 | End: 2021-10-18 | Stop reason: SDUPTHER

## 2021-06-09 DIAGNOSIS — E11.9 TYPE 2 DIABETES MELLITUS WITHOUT COMPLICATION, WITHOUT LONG-TERM CURRENT USE OF INSULIN (HCC): ICD-10-CM

## 2021-06-10 RX ORDER — BLOOD SUGAR DIAGNOSTIC
1 STRIP MISCELLANEOUS DAILY
Qty: 100 EACH | Refills: 3 | Status: SHIPPED
Start: 2021-06-10 | End: 2021-07-22 | Stop reason: SDUPTHER

## 2021-06-24 RX ORDER — DULAGLUTIDE 0.75 MG/.5ML
0.75 INJECTION, SOLUTION SUBCUTANEOUS WEEKLY
Qty: 4 PEN | Refills: 3 | Status: SHIPPED
Start: 2021-06-24 | End: 2021-07-22 | Stop reason: SDUPTHER

## 2021-07-15 ENCOUNTER — TELEPHONE (OUTPATIENT)
Dept: PRIMARY CARE CLINIC | Age: 41
End: 2021-07-15

## 2021-07-15 DIAGNOSIS — J33.9 NASAL POLYP: Primary | ICD-10-CM

## 2021-07-22 DIAGNOSIS — E11.9 TYPE 2 DIABETES MELLITUS WITHOUT COMPLICATION, WITHOUT LONG-TERM CURRENT USE OF INSULIN (HCC): ICD-10-CM

## 2021-07-22 RX ORDER — GLIMEPIRIDE 4 MG/1
4 TABLET ORAL
Qty: 30 TABLET | Refills: 3 | Status: SHIPPED
Start: 2021-07-22 | End: 2021-08-19 | Stop reason: SDUPTHER

## 2021-07-22 RX ORDER — DULAGLUTIDE 0.75 MG/.5ML
0.75 INJECTION, SOLUTION SUBCUTANEOUS WEEKLY
Qty: 4 PEN | Refills: 3 | Status: SHIPPED
Start: 2021-07-22 | End: 2021-08-19 | Stop reason: SDUPTHER

## 2021-07-22 RX ORDER — FLUTICASONE PROPIONATE 50 MCG
2 SPRAY, SUSPENSION (ML) NASAL DAILY
Qty: 1 BOTTLE | Refills: 5 | Status: SHIPPED
Start: 2021-07-22 | End: 2021-09-14 | Stop reason: SDUPTHER

## 2021-07-22 RX ORDER — BLOOD SUGAR DIAGNOSTIC
1 STRIP MISCELLANEOUS DAILY
Qty: 100 EACH | Refills: 3 | Status: SHIPPED
Start: 2021-07-22 | End: 2021-08-19 | Stop reason: SDUPTHER

## 2021-08-19 DIAGNOSIS — E11.9 TYPE 2 DIABETES MELLITUS WITHOUT COMPLICATION, WITHOUT LONG-TERM CURRENT USE OF INSULIN (HCC): ICD-10-CM

## 2021-08-19 RX ORDER — GLIMEPIRIDE 4 MG/1
4 TABLET ORAL
Qty: 30 TABLET | Refills: 3 | Status: SHIPPED
Start: 2021-08-19 | End: 2021-10-18 | Stop reason: SDUPTHER

## 2021-08-19 RX ORDER — DULAGLUTIDE 0.75 MG/.5ML
0.75 INJECTION, SOLUTION SUBCUTANEOUS WEEKLY
Qty: 4 PEN | Refills: 3 | Status: SHIPPED
Start: 2021-08-19 | End: 2021-09-14 | Stop reason: SDUPTHER

## 2021-08-19 RX ORDER — BLOOD SUGAR DIAGNOSTIC
1 STRIP MISCELLANEOUS DAILY
Qty: 100 EACH | Refills: 3 | Status: SHIPPED
Start: 2021-08-19 | End: 2021-09-14 | Stop reason: SDUPTHER

## 2021-08-27 ENCOUNTER — TELEPHONE (OUTPATIENT)
Dept: ADMINISTRATIVE | Age: 41
End: 2021-08-27

## 2021-08-27 NOTE — TELEPHONE ENCOUNTER
MA spoke with patient, she is ok to bring her daughter to the appointment due to no . Patient is instructed to have both herself and her daughter wear a mask to the appointment. Patient understood.     Electronically signed by Joya Guillermo MA on 8/27/21 at 1:01 PM EDT

## 2021-08-27 NOTE — TELEPHONE ENCOUNTER
Patient states she has no one to watch her 6year old daughter on 8/30. She is requesting to r/s for a Tuesday or Friday. Please advise.

## 2021-08-30 ENCOUNTER — OFFICE VISIT (OUTPATIENT)
Dept: ENT CLINIC | Age: 41
End: 2021-08-30
Payer: MEDICARE

## 2021-08-30 VITALS
OXYGEN SATURATION: 98 % | HEIGHT: 61 IN | SYSTOLIC BLOOD PRESSURE: 127 MMHG | TEMPERATURE: 97.1 F | DIASTOLIC BLOOD PRESSURE: 84 MMHG | WEIGHT: 141 LBS | BODY MASS INDEX: 26.62 KG/M2 | HEART RATE: 75 BPM

## 2021-08-30 DIAGNOSIS — R09.81 NASAL CONGESTION: Primary | ICD-10-CM

## 2021-08-30 PROCEDURE — 1036F TOBACCO NON-USER: CPT | Performed by: NURSE PRACTITIONER

## 2021-08-30 PROCEDURE — G8417 CALC BMI ABV UP PARAM F/U: HCPCS | Performed by: NURSE PRACTITIONER

## 2021-08-30 PROCEDURE — G8427 DOCREV CUR MEDS BY ELIG CLIN: HCPCS | Performed by: NURSE PRACTITIONER

## 2021-08-30 PROCEDURE — 99203 OFFICE O/P NEW LOW 30 MIN: CPT | Performed by: NURSE PRACTITIONER

## 2021-08-30 NOTE — PROGRESS NOTES
[facility]  Dr. Mohini Arellano. CLARENCE Santana Ms.Ed. New Consult       Patient Name:  Jay Renee  :  1980     CHIEF C/O:    Chief Complaint   Patient presents with    Nasal Congestion     new patient with nasal polyp        HISTORY OBTAINED FROM:  patient    HISTORY OF PRESENT ILLNESS:       Alayna Correia is a 42y.o. year old female, here today for sinus congestion and nasal polyp. Patient states symptoms for greater than 10 years  Intermittent congestion without sinus pressure, rhinorrhea or post nasal drainage  No recent seasonal allergy symptoms  Does use flonase daily  No previous sinus surgeries, was recommended to her in the past but was pregnant at the time  No recent fevers or antibiotics  No hx of recurrent sinus infections        Past Medical History:   Diagnosis Date    Carpal tunnel syndrome, right     Diabetes mellitus ([address]     GERD (gastroesophageal reflux disease)     Hyperlipidemia     Hypothyroidism      Past Surgical History:   Procedure Laterality Date    CARPAL TUNNEL RELEASE Right 3/18/2020    RIGHT CARPAL TUNNEL RELEASE performed by Josie Enciso MD at [address] [facility],  YRS  2020            Current Outpatient Medications:     glimepiride (AMARYL) 4 MG tablet, Take 1 tablet by mouth every morning (before breakfast), Disp: 30 tablet, Rfl: 3    blood glucose test strips (ONETOUCH ULTRA) strip, 1 each by In Vitro route daily As needed. , Disp: 100 each, Rfl: 3    Dulaglutide (TRULICITY) 2.60 XQ/8.8UD SOPN, Inject 0.75 mg into the skin once a week, Disp: 4 pen, Rfl: 3    fluticasone (FLONASE) 50 MCG/ACT nasal spray, 2 sprays by Each Nostril route daily, Disp: 1 Bottle, Rfl: 5    LIFESCAN FINEPOINT LANCETS MISC, Test TID with meals, Disp: 100 each, Rfl: 1    atorvastatin (LIPITOR) 20 MG tablet, Take 1 tablet by mouth daily, Disp: 30 tablet, Rfl: 5    levothyroxine (SYNTHROID) 50 MCG tablet, Take 1 tablet by mouth daily, Disp: 30 tablet, Rfl: 5    blood glucose test strips (FREESTYLE LITE) strip, 1 each by In Vitro route daily As needed. , Disp: 100 each, Rfl: 3    Blood Glucose Monitoring Suppl (ONE TOUCH ULTRA 2) w/Device KIT, 1 kit by Does not apply route daily, Disp: 1 kit, Rfl: 0    ibuprofen (ADVIL;MOTRIN) 600 MG tablet, Take 1 tablet by mouth 3 times daily as needed for Pain, Disp: 90 tablet, Rfl: 0    Blood Glucose Monitoring Suppl (FREESTYLE FREEDOM LITE) w/Device KIT, 1 kit by Does not apply route daily, Disp: 1 kit, Rfl: 0  Iodine and Shellfish-derived products  Social History     Tobacco Use    Smoking status: Never Smoker    Smokeless tobacco: Never Used   Vaping Use    Vaping Use: Never used   Substance Use Topics    Alcohol use: Never    Drug use: Never     Family History   Problem Relation Age of Onset    Other Mother         aneurysm    Stroke Father     Heart Attack Father     No Known Problems Sister     Diabetes Brother     Heart Attack Brother 48    No Known Problems Sister        Review of Systems   Constitutional: Negative. Negative for activity change and appetite change. HENT: Positive for congestion. Negative for postnasal drip, rhinorrhea, sinus pressure and sinus pain. Eyes: Negative. Respiratory: Negative. Negative for shortness of breath and stridor. Cardiovascular: Negative. Negative for chest pain and palpitations. Endocrine: Negative. Musculoskeletal: Negative. Skin: Negative. Neurological: Negative. Negative for dizziness. Hematological: Negative. Psychiatric/Behavioral: Negative. /84 (Site: Left Upper Arm, Position: Sitting, Cuff Size: Large Adult)   Pulse 75   Temp 97.1 °F (36.2 °C)   Ht 5' 1\" (1.549 m)   Wt 141 lb (64 kg)   SpO2 98%   BMI 26.64 kg/m²   Physical Exam  Constitutional:       Appearance: Normal appearance. HENT:      Head: Normocephalic.       Right Ear: Tympanic membrane, ear canal and external ear normal.      Left Ear: Tympanic membrane, ear canal and external ear normal.      Nose: Nose normal. No rhinorrhea. Right Turbinates: Not pale. Left Turbinates: Not pale. Mouth/Throat:      Lips: Pink. Mouth: Mucous membranes are moist.      Pharynx: Oropharynx is clear. Eyes:      Conjunctiva/sclera: Conjunctivae normal.      Pupils: Pupils are equal, round, and reactive to light. Cardiovascular:      Rate and Rhythm: Normal rate and regular rhythm. Pulses: Normal pulses. Pulmonary:      Effort: Pulmonary effort is normal. No respiratory distress. Breath sounds: No stridor. Musculoskeletal:         General: Normal range of motion. Cervical back: Normal range of motion. No rigidity. No muscular tenderness. Skin:     General: Skin is warm and dry. Neurological:      General: No focal deficit present. Mental Status: She is alert and oriented to person, place, and time. Psychiatric:         Mood and Affect: Mood normal.         Behavior: Behavior normal.         Thought Content: Thought content normal.         Judgment: Judgment normal.         IMPRESSION/PLAN:      Abigail was seen today for nasal congestion. Diagnoses and all orders for this visit:    Nasal congestion      Physical exam is within normal limits. At this time due to her complaints she will start on Flonase, 2 sprays each nostril once daily as well as nasal saline several times daily. She will follow-up in 6 weeks. She is instructed to call with any new or worsening symptoms prior to her next appointment.         [place], JOSELUIS, FNP-C  [facility]    The information contained in this note has been dictated using drug and medical speech recognition software and may contain errors

## 2021-09-03 ENCOUNTER — OFFICE VISIT (OUTPATIENT)
Dept: CARDIOLOGY CLINIC | Age: 41
End: 2021-09-03
Payer: MEDICARE

## 2021-09-03 VITALS
WEIGHT: 151.7 LBS | HEART RATE: 70 BPM | RESPIRATION RATE: 16 BRPM | BODY MASS INDEX: 28.64 KG/M2 | HEIGHT: 61 IN | SYSTOLIC BLOOD PRESSURE: 128 MMHG | DIASTOLIC BLOOD PRESSURE: 74 MMHG

## 2021-09-03 DIAGNOSIS — E78.2 MIXED HYPERLIPIDEMIA: Primary | ICD-10-CM

## 2021-09-03 PROCEDURE — G8427 DOCREV CUR MEDS BY ELIG CLIN: HCPCS | Performed by: INTERNAL MEDICINE

## 2021-09-03 PROCEDURE — 1036F TOBACCO NON-USER: CPT | Performed by: INTERNAL MEDICINE

## 2021-09-03 PROCEDURE — 99214 OFFICE O/P EST MOD 30 MIN: CPT | Performed by: INTERNAL MEDICINE

## 2021-09-03 PROCEDURE — G8417 CALC BMI ABV UP PARAM F/U: HCPCS | Performed by: INTERNAL MEDICINE

## 2021-09-03 PROCEDURE — 93000 ELECTROCARDIOGRAM COMPLETE: CPT | Performed by: INTERNAL MEDICINE

## 2021-09-03 NOTE — PROGRESS NOTES
CHIEF COMPLAINT: Abnormal EKG/Palpitations    HISTORY OF PRESENT ILLNESS: Patient is a 42 y.o. female seen at the request of Hattie Moreno DO. Patient presents for abnormal EKG and palpitations. No CP or SOB. Past Medical History:   Diagnosis Date    Carpal tunnel syndrome, right     Diabetes mellitus (HCC)     GERD (gastroesophageal reflux disease)     Hyperlipidemia     Hypothyroidism        Patient Active Problem List   Diagnosis    Gastroesophageal reflux disease without esophagitis    Hiatal hernia    Carpal tunnel syndrome of right wrist    Type 2 diabetes mellitus without complication, without long-term current use of insulin (HCC)    Tendonitis    Acquired hypothyroidism    Mixed hyperlipidemia    Retinal artery occlusion       Allergies   Allergen Reactions    Iodine Anaphylaxis    Shellfish-Derived Products Anaphylaxis, Hives, Itching and Rash       Current Outpatient Medications   Medication Sig Dispense Refill    glimepiride (AMARYL) 4 MG tablet Take 1 tablet by mouth every morning (before breakfast) 30 tablet 3    blood glucose test strips (ONETOUCH ULTRA) strip 1 each by In Vitro route daily As needed. 100 each 3    Dulaglutide (TRULICITY) 2.11 SX/5.3DX SOPN Inject 0.75 mg into the skin once a week 4 pen 3    fluticasone (FLONASE) 50 MCG/ACT nasal spray 2 sprays by Each Nostril route daily 1 Bottle 5    TransceptaCAN FINEPOINT LANCETS MISC Test TID with meals 100 each 1    atorvastatin (LIPITOR) 20 MG tablet Take 1 tablet by mouth daily 30 tablet 5    levothyroxine (SYNTHROID) 50 MCG tablet Take 1 tablet by mouth daily 30 tablet 5    blood glucose test strips (FREESTYLE LITE) strip 1 each by In Vitro route daily As needed.  100 each 3    Blood Glucose Monitoring Suppl (ONE TOUCH ULTRA 2) w/Device KIT 1 kit by Does not apply route daily 1 kit 0    ibuprofen (ADVIL;MOTRIN) 600 MG tablet Take 1 tablet by mouth 3 times daily as needed for Pain 90 tablet 0    Blood Glucose Monitoring Suppl (FREESTYLE FREEDOM LITE) w/Device KIT 1 kit by Does not apply route daily 1 kit 0     No current facility-administered medications for this visit. Social History     Socioeconomic History    Marital status:      Spouse name: Not on file    Number of children: Not on file    Years of education: Not on file    Highest education level: Not on file   Occupational History    Not on file   Tobacco Use    Smoking status: Never Smoker    Smokeless tobacco: Never Used   Vaping Use    Vaping Use: Never used   Substance and Sexual Activity    Alcohol use: Never    Drug use: Never    Sexual activity: Not on file   Other Topics Concern    Not on file   Social History Narrative    Not on file     Social Determinants of Health     Financial Resource Strain:     Difficulty of Paying Living Expenses:    Food Insecurity:     Worried About Running Out of Food in the Last Year:     [address] in the Last Year:    Transportation Needs:     Lack of Transportation (Medical):      Lack of Transportation (Non-Medical):    Physical Activity:     Days of Exercise per Week:     Minutes of Exercise per Session:    Stress:     Feeling of Stress :    Social Connections:     Frequency of Communication with Friends and Family:     Frequency of Social Gatherings with Friends and Family:     Attends Taoism Services:     Active Member of Clubs or Organizations:     Attends Club or Organization Meetings:     Marital Status:    Intimate Partner Violence:     Fear of Current or Ex-Partner:     Emotionally Abused:     Physically Abused:     Sexually Abused:        Family History   Problem Relation Age of Onset    Other Mother         aneurysm    Stroke Father     Heart Attack Father     No Known Problems Sister     Diabetes Brother     Heart Attack Brother 48    No Known Problems Sister        Review of Systems:  Heart: as above   Lungs: as above   Eyes: denies changes in vision or discharge. Ears: denies changes in hearing or pain. Nose: denies epistaxis or masses   Throat: denies sore throat or trouble swallowing. Neuro: denies numbness, tingling, tremors. Skin: denies rashes or itching. : denies hematuria, dysuria   GI: denies vomiting, diarrhea   Psych: denies mood changed, anxiety, depression. All other systems negative. Physical Exam   /74   Pulse 70   Resp 16   Ht 5' 1\" (1.549 m)   Wt 151 lb 11.2 oz (68.8 kg)   BMI 28.66 kg/m²   Constitutional: Oriented to person, place, and time. Well-developed and well-nourished. No distress. Head: Normocephalic and atraumatic. Eyes: EOM are normal. Pupils are equal, round, and reactive to light. Neck: Normal range of motion. Neck supple. No hepatojugular reflux and no JVD present. Carotid bruit is not present. No tracheal deviation present. No thyromegaly present. Cardiovascular: Normal rate, regular rhythm, normal heart sounds and intact distal pulses. Exam reveals no gallop and no friction rub. No murmur heard. Pulmonary/Chest: Effort normal and breath sounds normal. No respiratory distress. No wheezes. No rales. No tenderness. Abdominal: Soft. Bowel sounds are normal. No distension and no mass. No tenderness. No rebound and no guarding. Musculoskeletal: Normal range of motion. No edema and no tenderness. Lymphadenopathy:   No cervical adenopathy. No groin adenopathy. Neurological: Alert and oriented to person, place, and time. Skin: Skin is warm and dry. No rash noted. Not diaphoretic. No erythema. Psychiatric: Normal mood and affect. Behavior is normal.     EKG:  normal sinus rhythm, Normal EKG. Echo Summary 11/24/2020:   Ejection fraction is visually estimated at 55%. No regional wall motion abnormalities seen. Normal right ventricle structure and function. Physiologic and/or trace mitral regurgitation is present.      ASSESSMENT AND PLAN:  Patient Active Problem List Diagnosis    Gastroesophageal reflux disease without esophagitis    Hiatal hernia    Carpal tunnel syndrome of right wrist    Type 2 diabetes mellitus without complication, without long-term current use of insulin (HCC)    Tendonitis    Acquired hypothyroidism    Mixed hyperlipidemia    Retinal artery occlusion     1. Palpitations/Abnormal EKG:     Echo normal as above. 2. Hx of Pyogenic Liver Abscess: Per ID. 3. Lipids: Statin. 4. DM: Per PCP. 5. Hypothyroidism: On HRT. Darlene Aldana D.O.   Cardiologist  Cardiology, [facility]

## 2021-09-07 ASSESSMENT — ENCOUNTER SYMPTOMS
STRIDOR: 0
EYES NEGATIVE: 1
RESPIRATORY NEGATIVE: 1
SHORTNESS OF BREATH: 0
SINUS PRESSURE: 0
SINUS PAIN: 0
RHINORRHEA: 0

## 2021-09-14 DIAGNOSIS — E11.9 TYPE 2 DIABETES MELLITUS WITHOUT COMPLICATION, WITHOUT LONG-TERM CURRENT USE OF INSULIN (HCC): ICD-10-CM

## 2021-09-14 RX ORDER — FLUTICASONE PROPIONATE 50 MCG
2 SPRAY, SUSPENSION (ML) NASAL DAILY
Qty: 1 EACH | Refills: 1 | Status: SHIPPED
Start: 2021-09-14 | End: 2021-10-18 | Stop reason: SDUPTHER

## 2021-09-14 RX ORDER — DULAGLUTIDE 0.75 MG/.5ML
0.75 INJECTION, SOLUTION SUBCUTANEOUS WEEKLY
Qty: 4 PEN | Refills: 3 | Status: SHIPPED
Start: 2021-09-14 | End: 2021-10-18 | Stop reason: SDUPTHER

## 2021-09-14 RX ORDER — BLOOD SUGAR DIAGNOSTIC
1 STRIP MISCELLANEOUS DAILY
Qty: 100 EACH | Refills: 3 | Status: SHIPPED
Start: 2021-09-14 | End: 2021-11-24 | Stop reason: SDUPTHER

## 2021-10-08 ENCOUNTER — OFFICE VISIT (OUTPATIENT)
Dept: PRIMARY CARE CLINIC | Age: 41
End: 2021-10-08
Payer: MEDICARE

## 2021-10-08 VITALS
RESPIRATION RATE: 16 BRPM | SYSTOLIC BLOOD PRESSURE: 120 MMHG | BODY MASS INDEX: 29.07 KG/M2 | HEART RATE: 81 BPM | HEIGHT: 61 IN | WEIGHT: 154 LBS | DIASTOLIC BLOOD PRESSURE: 74 MMHG | OXYGEN SATURATION: 99 %

## 2021-10-08 DIAGNOSIS — E11.9 TYPE 2 DIABETES MELLITUS WITHOUT COMPLICATION, WITHOUT LONG-TERM CURRENT USE OF INSULIN (HCC): Primary | ICD-10-CM

## 2021-10-08 DIAGNOSIS — M65.339 TRIGGER MIDDLE FINGER, UNSPECIFIED LATERALITY: ICD-10-CM

## 2021-10-08 DIAGNOSIS — K21.9 GASTROESOPHAGEAL REFLUX DISEASE WITHOUT ESOPHAGITIS: ICD-10-CM

## 2021-10-08 DIAGNOSIS — E78.2 MIXED HYPERLIPIDEMIA: ICD-10-CM

## 2021-10-08 DIAGNOSIS — E03.9 ACQUIRED HYPOTHYROIDISM: ICD-10-CM

## 2021-10-08 PROCEDURE — G8484 FLU IMMUNIZE NO ADMIN: HCPCS | Performed by: FAMILY MEDICINE

## 2021-10-08 PROCEDURE — 2022F DILAT RTA XM EVC RTNOPTHY: CPT | Performed by: FAMILY MEDICINE

## 2021-10-08 PROCEDURE — G8417 CALC BMI ABV UP PARAM F/U: HCPCS | Performed by: FAMILY MEDICINE

## 2021-10-08 PROCEDURE — 99214 OFFICE O/P EST MOD 30 MIN: CPT | Performed by: FAMILY MEDICINE

## 2021-10-08 PROCEDURE — 1036F TOBACCO NON-USER: CPT | Performed by: FAMILY MEDICINE

## 2021-10-08 PROCEDURE — 3051F HG A1C>EQUAL 7.0%<8.0%: CPT | Performed by: FAMILY MEDICINE

## 2021-10-08 PROCEDURE — G8427 DOCREV CUR MEDS BY ELIG CLIN: HCPCS | Performed by: FAMILY MEDICINE

## 2021-10-08 ASSESSMENT — ENCOUNTER SYMPTOMS
EYE PAIN: 0
CONSTIPATION: 0
EYE REDNESS: 0
VOMITING: 0
BLOOD IN STOOL: 0
CHEST TIGHTNESS: 0
ABDOMINAL PAIN: 0
SORE THROAT: 0
WHEEZING: 0
RHINORRHEA: 0
BACK PAIN: 0
EYE ITCHING: 0
DIARRHEA: 0
SINUS PRESSURE: 0
NAUSEA: 0
COUGH: 0
COLOR CHANGE: 0
SHORTNESS OF BREATH: 0
APNEA: 0

## 2021-10-08 NOTE — PROGRESS NOTES
Chief Complaint:     Chief Complaint   Patient presents with    Diabetes    Hypothyroidism    Hyperlipidemia         Diabetes  She presents for her follow-up diabetic visit. She has type 2 diabetes mellitus. Her disease course has been stable. Hypoglycemia symptoms include headaches. Pertinent negatives for hypoglycemia include no dizziness or nervousness/anxiousness. Pertinent negatives for diabetes include no chest pain, no fatigue, no weakness and no weight loss. There are no hypoglycemic complications. Symptoms are stable. There are no diabetic complications. Risk factors for coronary artery disease include diabetes mellitus and dyslipidemia. Current diabetic treatment includes oral agent (monotherapy). She is compliant with treatment all of the time. Her weight is stable. When asked about meal planning, she reported none. There is no change in her home blood glucose trend. An ACE inhibitor/angiotensin II receptor blocker is not being taken. She does not see a podiatrist.Eye exam is not current. Hyperlipidemia  This is a chronic problem. The current episode started more than 1 month ago. The problem is controlled. Exacerbating diseases include diabetes and hypothyroidism. Pertinent negatives include no chest pain, myalgias or shortness of breath. Current antihyperlipidemic treatment includes statins. The current treatment provides significant improvement of lipids. There are no compliance problems. Risk factors for coronary artery disease include diabetes mellitus and dyslipidemia. Other  This is a recurrent problem. The current episode started more than 1 month ago. The problem occurs intermittently. The problem has been waxing and waning. Associated symptoms include headaches. Pertinent negatives include no abdominal pain, arthralgias, chest pain, congestion, coughing, fatigue, fever, myalgias, nausea, neck pain, numbness, rash, sore throat, vomiting or weakness. Nothing aggravates the symptoms.  She has tried nothing for the symptoms. The treatment provided no relief. Patient Active Problem List   Diagnosis    Gastroesophageal reflux disease without esophagitis    Hiatal hernia    Carpal tunnel syndrome of right wrist    Type 2 diabetes mellitus without complication, without long-term current use of insulin ([address]    Tendonitis    Acquired hypothyroidism    Mixed hyperlipidemia    Retinal artery occlusion       Past Medical History:   Diagnosis Date    Carpal tunnel syndrome, right     Diabetes mellitus (HCC)     GERD (gastroesophageal reflux disease)     Hyperlipidemia     Hypothyroidism        Past Surgical History:   Procedure Laterality Date    CARPAL TUNNEL RELEASE Right 3/18/2020    RIGHT CARPAL TUNNEL RELEASE performed by Andriy Romero MD at [facility], 5/> YRS  7/4/2020            Current Outpatient Medications   Medication Sig Dispense Refill    fluticasone (FLONASE) 50 MCG/ACT nasal spray 2 sprays by Each Nostril route daily 1 each 1    blood glucose test strips (ONETOUCH ULTRA) strip 1 each by In Vitro route daily As needed. 100 each 3    Dulaglutide (TRULICITY) 4.25 II/3.7AA SOPN Inject 0.75 mg into the skin once a week 4 pen 3    glimepiride (AMARYL) 4 MG tablet Take 1 tablet by mouth every morning (before breakfast) 30 tablet 3    GameSaladCAN FINEPOINT LANCETS MISC Test TID with meals 100 each 1    atorvastatin (LIPITOR) 20 MG tablet Take 1 tablet by mouth daily 30 tablet 5    levothyroxine (SYNTHROID) 50 MCG tablet Take 1 tablet by mouth daily 30 tablet 5    Blood Glucose Monitoring Suppl (ONE TOUCH ULTRA 2) w/Device KIT 1 kit by Does not apply route daily 1 kit 0    ibuprofen (ADVIL;MOTRIN) 600 MG tablet Take 1 tablet by mouth 3 times daily as needed for Pain 90 tablet 0     No current facility-administered medications for this visit.        Allergies   Allergen Reactions    Iodine Anaphylaxis    Shellfish-Derived Products Anaphylaxis, Hives, Itching and Rash       Social History     Socioeconomic History    Marital status:      Spouse name: None    Number of children: None    Years of education: None    Highest education level: None   Occupational History    None   Tobacco Use    Smoking status: Never Smoker    Smokeless tobacco: Never Used   Vaping Use    Vaping Use: Never used   Substance and Sexual Activity    Alcohol use: Never    Drug use: Never    Sexual activity: None   Other Topics Concern    None   Social History Narrative    None     Social Determinants of Health     Financial Resource Strain:     Difficulty of Paying Living Expenses:    Food Insecurity:     Worried About Running Out of Food in the Last Year:     Ran Out of Food in the Last Year:    Transportation Needs:     Lack of Transportation (Medical):  Lack of Transportation (Non-Medical):    Physical Activity:     Days of Exercise per Week:     Minutes of Exercise per Session:    Stress:     Feeling of Stress :    Social Connections:     Frequency of Communication with Friends and Family:     Frequency of Social Gatherings with Friends and Family:     Attends [employer] Services:     Active Member of Clubs or Organizations:     Attends Club or Organization Meetings:     Marital Status:    Intimate Partner Violence:     Fear of Current or Ex-Partner:     Emotionally Abused:     Physically Abused:     Sexually Abused:        Family History   Problem Relation Age of Onset    Other Mother         aneurysm    Stroke Father     Heart Attack Father     No Known Problems Sister     Diabetes Brother     Heart Attack Brother 48    No Known Problems Sister           Review of Systems   Constitutional: Negative for activity change, appetite change, fatigue, fever and weight loss. HENT: Negative for congestion, ear pain, hearing loss, nosebleeds, rhinorrhea, sinus pressure and sore throat.     Eyes: Negative for pain, redness, itching and visual disturbance. Respiratory: Negative for apnea, cough, chest tightness, shortness of breath and wheezing. Cardiovascular: Negative for chest pain, palpitations and leg swelling. Gastrointestinal: Negative for abdominal pain, blood in stool, constipation, diarrhea, nausea and vomiting. Endocrine: Negative. Genitourinary: Negative for decreased urine volume, difficulty urinating, dysuria, frequency, hematuria and urgency. Musculoskeletal: Negative for arthralgias, back pain, gait problem, myalgias and neck pain. Skin: Negative for color change and rash. Allergic/Immunologic: Negative for environmental allergies and food allergies. Neurological: Positive for headaches. Negative for dizziness, weakness, light-headedness and numbness. Hematological: Negative for adenopathy. Does not bruise/bleed easily. Psychiatric/Behavioral: Negative for behavioral problems, dysphoric mood and sleep disturbance. The patient is not nervous/anxious and is not hyperactive. All other systems reviewed and are negative. /74   Pulse 81   Resp 16   Ht 5' 1\" (1.549 m)   Wt 154 lb (69.9 kg)   SpO2 99%   BMI 29.10 kg/m²     Physical Exam  Vitals and nursing note reviewed. Constitutional:       General: She is not in acute distress. Appearance: Normal appearance. She is well-developed. HENT:      Head: Normocephalic and atraumatic. Right Ear: Hearing, tympanic membrane and external ear normal. No tenderness. No middle ear effusion. Left Ear: Hearing, tympanic membrane and external ear normal. No tenderness. No middle ear effusion. Nose: Nose normal. No congestion or rhinorrhea. Right Turbinates: Not enlarged. Left Turbinates: Not enlarged. Mouth/Throat:      Mouth: Mucous membranes are moist.      Tongue: No lesions. Pharynx: Oropharynx is clear. No oropharyngeal exudate or posterior oropharyngeal erythema. Eyes:      General: No scleral icterus. Conjunctiva/sclera: Conjunctivae normal.      Pupils: Pupils are equal, round, and reactive to light. Neck:      Thyroid: No thyromegaly. Cardiovascular:      Rate and Rhythm: Normal rate and regular rhythm. Heart sounds: Normal heart sounds. No murmur heard. Pulmonary:      Effort: Pulmonary effort is normal. No respiratory distress. Breath sounds: Normal breath sounds. No wheezing or rales. Abdominal:      General: Bowel sounds are normal. There is no distension. Palpations: Abdomen is soft. Tenderness: There is no abdominal tenderness. Musculoskeletal:         General: No tenderness. Normal range of motion. Cervical back: Normal range of motion and neck supple. No rigidity. No muscular tenderness. Lymphadenopathy:      Cervical: No cervical adenopathy. Skin:     General: Skin is warm and dry. Findings: No erythema or rash. Neurological:      General: No focal deficit present. Mental Status: She is alert and oriented to person, place, and time. Cranial Nerves: No cranial nerve deficit. Deep Tendon Reflexes: Reflexes are normal and symmetric. Reflexes normal.   Psychiatric:         Mood and Affect: Mood normal.                                 ASSESSMENT/PLAN:    Patient Active Problem List   Diagnosis    Gastroesophageal reflux disease without esophagitis    Hiatal hernia    Carpal tunnel syndrome of right wrist    Type 2 diabetes mellitus without complication, without long-term current use of insulin ([address]    Tendonitis    Acquired hypothyroidism    Mixed hyperlipidemia    Retinal artery occlusion       [facility] was [facility] today for diabetes, hypothyroidism and hyperlipidemia. Diagnoses and all orders for this visit:    Type 2 diabetes mellitus without complication, without long-term current use of insulin ([facility])  -     Comprehensive Metabolic Panel; Future  -     Lipid Panel;  Future  -     Hemoglobin A1C; Future  -     TSH without Reflex; Future  -     Microalbumin / Creatinine Urine Ratio; Future    Acquired hypothyroidism  -     TSH without Reflex; Future    Gastroesophageal reflux disease without esophagitis    Mixed hyperlipidemia  -     Comprehensive Metabolic Panel; Future  -     Lipid Panel; Future    Trigger middle finger, unspecified laterality  -     XR HAND LEFT (MIN 3 VIEWS); Future  -     XR HAND RIGHT (MIN 3 VIEWS); Future  -     Akira Stark MD, [facility], [place]          Return in about 6 months (around 4/8/2022) for Follow up DM, Follow up Lipids, Recheck labs, Recheck Meds. I spent 30 minutes with this patient. I spent greater than 50% of the time counseling this patient.         Yasmin Perez DO  10/8/2021  8:49 AM

## 2021-10-18 DIAGNOSIS — E11.9 TYPE 2 DIABETES MELLITUS WITHOUT COMPLICATION, WITHOUT LONG-TERM CURRENT USE OF INSULIN (HCC): ICD-10-CM

## 2021-10-18 RX ORDER — LEVOTHYROXINE SODIUM 0.05 MG/1
50 TABLET ORAL DAILY
Qty: 30 TABLET | Refills: 5 | Status: SHIPPED
Start: 2021-10-18 | End: 2021-11-24 | Stop reason: SDUPTHER

## 2021-10-18 RX ORDER — FLUTICASONE PROPIONATE 50 MCG
2 SPRAY, SUSPENSION (ML) NASAL DAILY
Qty: 1 EACH | Refills: 1 | Status: SHIPPED
Start: 2021-10-18 | End: 2021-11-24 | Stop reason: SDUPTHER

## 2021-10-18 RX ORDER — DULAGLUTIDE 0.75 MG/.5ML
0.75 INJECTION, SOLUTION SUBCUTANEOUS WEEKLY
Qty: 4 PEN | Refills: 3 | Status: SHIPPED
Start: 2021-10-18 | End: 2021-11-24 | Stop reason: SDUPTHER

## 2021-10-18 RX ORDER — BLOOD-GLUCOSE METER
1 EACH MISCELLANEOUS DAILY
Qty: 1 KIT | Refills: 0 | Status: SHIPPED
Start: 2021-10-18 | End: 2022-06-18 | Stop reason: SDUPTHER

## 2021-10-18 RX ORDER — ATORVASTATIN CALCIUM 20 MG/1
20 TABLET, FILM COATED ORAL DAILY
Qty: 30 TABLET | Refills: 5 | Status: SHIPPED
Start: 2021-10-18 | End: 2021-11-24 | Stop reason: SDUPTHER

## 2021-10-18 RX ORDER — GLIMEPIRIDE 4 MG/1
4 TABLET ORAL
Qty: 30 TABLET | Refills: 3 | Status: SHIPPED
Start: 2021-10-18 | End: 2021-11-24 | Stop reason: SDUPTHER

## 2021-10-18 RX ORDER — BLOOD-GLUCOSE METER
EACH MISCELLANEOUS
Qty: 100 EACH | Refills: 1 | Status: SHIPPED
Start: 2021-10-18 | End: 2022-02-03 | Stop reason: SDUPTHER

## 2021-10-22 DIAGNOSIS — E78.2 MIXED HYPERLIPIDEMIA: ICD-10-CM

## 2021-10-22 DIAGNOSIS — E11.9 TYPE 2 DIABETES MELLITUS WITHOUT COMPLICATION, WITHOUT LONG-TERM CURRENT USE OF INSULIN (HCC): ICD-10-CM

## 2021-10-22 DIAGNOSIS — E03.9 ACQUIRED HYPOTHYROIDISM: ICD-10-CM

## 2021-10-22 LAB
ALBUMIN SERPL-MCNC: 4.4 G/DL (ref 3.5–5.2)
ALP BLD-CCNC: 68 U/L (ref 35–104)
ALT SERPL-CCNC: 14 U/L (ref 0–32)
ANION GAP SERPL CALCULATED.3IONS-SCNC: 17 MMOL/L (ref 7–16)
AST SERPL-CCNC: 22 U/L (ref 0–31)
BILIRUB SERPL-MCNC: 0.3 MG/DL (ref 0–1.2)
BUN BLDV-MCNC: 14 MG/DL (ref 6–20)
CALCIUM SERPL-MCNC: 9.2 MG/DL (ref 8.6–10.2)
CHLORIDE BLD-SCNC: 106 MMOL/L (ref 98–107)
CHOLESTEROL, TOTAL: 215 MG/DL (ref 0–199)
CO2: 18 MMOL/L (ref 22–29)
CREAT SERPL-MCNC: 0.7 MG/DL (ref 0.5–1)
CREATININE URINE: 153 MG/DL (ref 29–226)
GFR AFRICAN AMERICAN: >60
GFR NON-AFRICAN AMERICAN: >60 ML/MIN/1.73
GLUCOSE BLD-MCNC: 117 MG/DL (ref 74–99)
HBA1C MFR BLD: 7.5 % (ref 4–5.6)
HDLC SERPL-MCNC: 44 MG/DL
LDL CHOLESTEROL CALCULATED: 141 MG/DL (ref 0–99)
MICROALBUMIN UR-MCNC: 17.1 MG/L
MICROALBUMIN/CREAT UR-RTO: 11.2 (ref 0–30)
POTASSIUM SERPL-SCNC: 4.5 MMOL/L (ref 3.5–5)
SODIUM BLD-SCNC: 141 MMOL/L (ref 132–146)
TOTAL PROTEIN: 7.2 G/DL (ref 6.4–8.3)
TRIGL SERPL-MCNC: 152 MG/DL (ref 0–149)
TSH SERPL DL<=0.05 MIU/L-ACNC: 3.48 UIU/ML (ref 0.27–4.2)
VLDLC SERPL CALC-MCNC: 30 MG/DL

## 2021-10-25 ENCOUNTER — OFFICE VISIT (OUTPATIENT)
Dept: ORTHOPEDIC SURGERY | Age: 41
End: 2021-10-25
Payer: MEDICARE

## 2021-10-25 VITALS — RESPIRATION RATE: 18 BRPM | BODY MASS INDEX: 27.94 KG/M2 | HEIGHT: 61 IN | WEIGHT: 148 LBS

## 2021-10-25 DIAGNOSIS — M65.311 TRIGGER FINGER OF RIGHT THUMB: ICD-10-CM

## 2021-10-25 DIAGNOSIS — M65.331 ACQUIRED TRIGGER FINGER OF BOTH MIDDLE FINGERS: Primary | ICD-10-CM

## 2021-10-25 DIAGNOSIS — M65.332 ACQUIRED TRIGGER FINGER OF BOTH MIDDLE FINGERS: Primary | ICD-10-CM

## 2021-10-25 PROCEDURE — G8417 CALC BMI ABV UP PARAM F/U: HCPCS | Performed by: ORTHOPAEDIC SURGERY

## 2021-10-25 PROCEDURE — 1036F TOBACCO NON-USER: CPT | Performed by: ORTHOPAEDIC SURGERY

## 2021-10-25 PROCEDURE — G8427 DOCREV CUR MEDS BY ELIG CLIN: HCPCS | Performed by: ORTHOPAEDIC SURGERY

## 2021-10-25 PROCEDURE — 20550 NJX 1 TENDON SHEATH/LIGAMENT: CPT | Performed by: ORTHOPAEDIC SURGERY

## 2021-10-25 PROCEDURE — 99214 OFFICE O/P EST MOD 30 MIN: CPT | Performed by: ORTHOPAEDIC SURGERY

## 2021-10-25 PROCEDURE — G8484 FLU IMMUNIZE NO ADMIN: HCPCS | Performed by: ORTHOPAEDIC SURGERY

## 2021-10-25 RX ORDER — BETAMETHASONE SODIUM PHOSPHATE AND BETAMETHASONE ACETATE 3; 3 MG/ML; MG/ML
18 INJECTION, SUSPENSION INTRA-ARTICULAR; INTRALESIONAL; INTRAMUSCULAR; SOFT TISSUE ONCE
Status: COMPLETED | OUTPATIENT
Start: 2021-10-25 | End: 2021-10-25

## 2021-10-25 RX ADMIN — BETAMETHASONE SODIUM PHOSPHATE AND BETAMETHASONE ACETATE 18 MG: 3; 3 INJECTION, SUSPENSION INTRA-ARTICULAR; INTRALESIONAL; INTRAMUSCULAR; SOFT TISSUE at 15:30

## 2021-10-25 NOTE — PROGRESS NOTES
Chief Complaint   Patient presents with    Trigger finger     Bilateral middle trigger finger    Finger Pain     Rt thumb pain/flexion difficulty         Robert Pierre is a 42y.o. year old  who presents with new onset right middle and thumb triggers. She reports these began several months ago and have worsened. She is now at the point where she has constant stiffness in the thumb and middle fingers on the right. Recently she started developing similar symptoms in the contralateral left middle finger. She reports active triggering in the mornings which improves but is persistent throughout the day. She is developing a PIP joint contracture on the left middle finger. She has not had any treatment for these. She is tried over-the-counter Tylenol and Motrin without improvement. She reports she is still doing well status post a right carpal tunnel release with only intermittent symptoms over the forearm region.       Past Medical History:   Diagnosis Date    Carpal tunnel syndrome, right     Diabetes mellitus (HCC)     GERD (gastroesophageal reflux disease)     Hyperlipidemia     Hypothyroidism      Past Surgical History:   Procedure Laterality Date    CARPAL TUNNEL RELEASE Right 3/18/2020    RIGHT CARPAL TUNNEL RELEASE performed by Zayra Frank MD at [facility], 5/> YRS  7/4/2020            Current Outpatient Medications:     Blood Glucose Monitoring Suppl (ONE TOUCH ULTRA 2) w/Device KIT, 1 kit by Does not apply route daily, Disp: 1 kit, Rfl: 0    atorvastatin (LIPITOR) 20 MG tablet, Take 1 tablet by mouth daily, Disp: 30 tablet, Rfl: 5    levothyroxine (SYNTHROID) 50 MCG tablet, Take 1 tablet by mouth daily, Disp: 30 tablet, Rfl: 5    LIFESCAN FINEPOINT LANCETS MISC, Test TID with meals, Disp: 100 each, Rfl: 1    glimepiride (AMARYL) 4 MG tablet, Take 1 tablet by mouth every morning (before breakfast), Disp: 30 tablet, Rfl: 3    fluticasone (FLONASE) 50 MCG/ACT nasal spray, 2 sprays by Each Nostril route daily, Disp: 1 each, Rfl: 1    Dulaglutide (TRULICITY) 5.96 CQ/4.3ZW SOPN, Inject 0.75 mg into the skin once a week, Disp: 4 pen, Rfl: 3    blood glucose test strips (ONETOUCH ULTRA) strip, 1 each by In Vitro route daily As needed. , Disp: 100 each, Rfl: 3    ibuprofen (ADVIL;MOTRIN) 600 MG tablet, Take 1 tablet by mouth 3 times daily as needed for Pain, Disp: 90 tablet, Rfl: 0  Allergies   Allergen Reactions    Iodine Anaphylaxis    Shellfish-Derived Products Anaphylaxis, Hives, Itching and Rash     Social History     Socioeconomic History    Marital status:      Spouse name: Not on file    Number of children: Not on file    Years of education: Not on file    Highest education level: Not on file   Occupational History    Not on file   Tobacco Use    Smoking status: Never Smoker    Smokeless tobacco: Never Used   Vaping Use    Vaping Use: Never used   Substance and Sexual Activity    Alcohol use: Never    Drug use: Never    Sexual activity: Not on file   Other Topics Concern    Not on file   Social History Narrative    Not on file     Social Determinants of Health     Financial Resource Strain:     Difficulty of Paying Living Expenses:    Food Insecurity:     Worried About Running Out of Food in the Last Year:     Ran Out of Food in the Last Year:    Transportation Needs:     Lack of Transportation (Medical):      Lack of Transportation (Non-Medical):    Physical Activity:     Days of Exercise per Week:     Minutes of Exercise per Session:    Stress:     Feeling of Stress :    Social Connections:     Frequency of Communication with Friends and Family:     Frequency of Social Gatherings with Friends and Family:     Attends [employer] Services:     Active Member of Clubs or Organizations:     Attends Club or Organization Meetings:     Marital Status:    Intimate Partner Violence:     Fear of Current or Ex-Partner:     Emotionally Abused:     negative fracture dislocations in the bilateral hands. Radiographic findings reviewed with patient      Impression:   Encounter Diagnoses   Name Primary?  Acquired trigger finger of both middle fingers Yes    Trigger finger of right thumb    Diabetes  Carpal tunnel syndrome  GERD    Plan: Today's find were explained the patient. Treatment options were reviewed. Patient was fitted with PIP joint extension splints. In addition she was offered cortisone injections which were provided. She will follow-up in the office in 6 weeks if symptoms persist discussed possible surgical release if needed. Repeat injections every 3 months as needed. Procedure Note Trigger Finger Injection    The bilateral Middle finger A1 pullies and right thumb trigger were identified as the injection sites. The risk and benefits of a cortisone injection were explained and the patient consented to the injection. Under sterile conditions, each digit was injected with a mixture of 1 mL of 1% Lidocaine and 1 mL of 6 mg/mL Betamethasone without complication. A sterile bandage was applied.

## 2021-10-25 NOTE — PATIENT INSTRUCTIONS
Patient Education        Trigger Finger: Care Instructions  Overview  A trigger finger is a finger stuck in a bent position. It happens when the tendon that bends and straightens the thumb or finger can't slide smoothly under the ligaments that hold the tendon against the bones. In most cases, it's caused by a bump (nodule) that forms on the tendon. The bent finger usually straightens out on its own. A trigger finger can be painful. But it normally isn't a serious problem. Trigger fingers seem to occur more in some groups of people. These groups include:  · People who have diabetes or arthritis. · People who have injured their hands in the past.  · Musicians. · People who  tools often. Rest and exercises may help your finger relax so that it can bend. You may get a corticosteroid shot. This can reduce swelling and pain. Your doctor may put a splint on your finger. It will give your finger some rest. You may need surgery if the finger keeps locking in a bent position. Follow-up care is a key part of your treatment and safety. Be sure to make and go to all appointments, and call your doctor if you are having problems. It's also a good idea to know your test results and keep a list of the medicines you take. How can you care for yourself at home? · If your doctor put a splint on your finger, wear it as directed. Don't take it off until your doctor says you can. · You may need to change your activities to avoid movements that irritate the finger. · Take your medicines exactly as prescribed. Call your doctor if you think you are having a problem with your medicine. · Ask your doctor if you can take an over-the-counter pain medicine, such as acetaminophen (Tylenol), ibuprofen (Advil, Motrin), or naproxen (Aleve). Be safe with medicines. Read and follow all instructions on the label. · If your doctor recommends exercises, do them as directed. When should you call for help?    Call your doctor now or seek immediate medical care if:    · Your finger locks in a bent position and will not straighten. Watch closely for changes in your health, and be sure to contact your doctor if:    · You do not get better as expected. Where can you learn more? Go to https://chpehieb."[employer]. org and sign in to your [employer] account. Enter M826 in the Dot VN box to learn more about \"Trigger Finger: Care Instructions. \"     If you do not have an account, please click on the \"Sign Up Now\" link. Current as of: July 1, 2021               Content Version: 13.0  © 5490-2944 [employer]. Care instructions adapted under license by [facility] ([facility]). If you have questions about a medical condition or this instruction, always ask your healthcare professional. [address] any warranty or liability for your use of this information.

## 2021-11-24 DIAGNOSIS — E11.9 TYPE 2 DIABETES MELLITUS WITHOUT COMPLICATION, WITHOUT LONG-TERM CURRENT USE OF INSULIN (HCC): ICD-10-CM

## 2021-11-26 RX ORDER — GLIMEPIRIDE 4 MG/1
4 TABLET ORAL
Qty: 30 TABLET | Refills: 3 | Status: SHIPPED
Start: 2021-11-26 | End: 2022-02-03 | Stop reason: SDUPTHER

## 2021-11-26 RX ORDER — LEVOTHYROXINE SODIUM 0.05 MG/1
50 TABLET ORAL DAILY
Qty: 30 TABLET | Refills: 5 | Status: SHIPPED
Start: 2021-11-26 | End: 2022-02-03 | Stop reason: SDUPTHER

## 2021-11-26 RX ORDER — BLOOD SUGAR DIAGNOSTIC
1 STRIP MISCELLANEOUS DAILY
Qty: 100 EACH | Refills: 3 | Status: SHIPPED
Start: 2021-11-26 | End: 2022-02-03 | Stop reason: SDUPTHER

## 2021-11-26 RX ORDER — FLUTICASONE PROPIONATE 50 MCG
2 SPRAY, SUSPENSION (ML) NASAL DAILY
Qty: 1 EACH | Refills: 1 | Status: SHIPPED
Start: 2021-11-26 | End: 2022-02-03 | Stop reason: SDUPTHER

## 2021-11-26 RX ORDER — ATORVASTATIN CALCIUM 20 MG/1
20 TABLET, FILM COATED ORAL DAILY
Qty: 30 TABLET | Refills: 5 | Status: SHIPPED
Start: 2021-11-26 | End: 2022-02-03 | Stop reason: SDUPTHER

## 2021-11-26 RX ORDER — DULAGLUTIDE 0.75 MG/.5ML
0.75 INJECTION, SOLUTION SUBCUTANEOUS WEEKLY
Qty: 4 PEN | Refills: 3 | Status: SHIPPED
Start: 2021-11-26 | End: 2021-12-13 | Stop reason: SDUPTHER

## 2021-12-13 RX ORDER — DULAGLUTIDE 0.75 MG/.5ML
0.75 INJECTION, SOLUTION SUBCUTANEOUS WEEKLY
Qty: 4 PEN | Refills: 3 | Status: SHIPPED
Start: 2021-12-13 | End: 2021-12-30 | Stop reason: SDUPTHER

## 2021-12-30 RX ORDER — DULAGLUTIDE 0.75 MG/.5ML
0.75 INJECTION, SOLUTION SUBCUTANEOUS WEEKLY
Qty: 4 PEN | Refills: 3 | Status: SHIPPED
Start: 2021-12-30 | End: 2022-01-04

## 2022-01-03 ENCOUNTER — TELEPHONE (OUTPATIENT)
Dept: PRIMARY CARE CLINIC | Age: 42
End: 2022-01-03

## 2022-01-03 NOTE — TELEPHONE ENCOUNTER
Patient is calling in to advise that trulicity is no longer covered - a prior authorization was sent to us and she would like for that to be done

## 2022-01-04 RX ORDER — DULAGLUTIDE 1.5 MG/.5ML
1.5 INJECTION, SOLUTION SUBCUTANEOUS WEEKLY
Qty: 4 PEN | Refills: 2 | Status: SHIPPED
Start: 2022-01-04 | End: 2022-02-03 | Stop reason: SDUPTHER

## 2022-02-03 DIAGNOSIS — E11.9 TYPE 2 DIABETES MELLITUS WITHOUT COMPLICATION, WITHOUT LONG-TERM CURRENT USE OF INSULIN (HCC): ICD-10-CM

## 2022-02-04 RX ORDER — DULAGLUTIDE 1.5 MG/.5ML
1.5 INJECTION, SOLUTION SUBCUTANEOUS WEEKLY
Qty: 4 PEN | Refills: 2 | Status: SHIPPED
Start: 2022-02-04 | End: 2022-03-10 | Stop reason: SDUPTHER

## 2022-02-04 RX ORDER — GLIMEPIRIDE 4 MG/1
4 TABLET ORAL
Qty: 30 TABLET | Refills: 3 | Status: SHIPPED
Start: 2022-02-04 | End: 2022-03-10 | Stop reason: SDUPTHER

## 2022-02-04 RX ORDER — BLOOD-GLUCOSE METER
EACH MISCELLANEOUS
Qty: 100 EACH | Refills: 1 | Status: SHIPPED
Start: 2022-02-04 | End: 2022-03-10 | Stop reason: SDUPTHER

## 2022-02-04 RX ORDER — ATORVASTATIN CALCIUM 20 MG/1
20 TABLET, FILM COATED ORAL DAILY
Qty: 30 TABLET | Refills: 5 | Status: SHIPPED
Start: 2022-02-04 | End: 2022-04-20 | Stop reason: SDUPTHER

## 2022-02-04 RX ORDER — FLUTICASONE PROPIONATE 50 MCG
2 SPRAY, SUSPENSION (ML) NASAL DAILY
Qty: 1 EACH | Refills: 1 | Status: SHIPPED
Start: 2022-02-04 | End: 2022-04-20 | Stop reason: SDUPTHER

## 2022-02-04 RX ORDER — LEVOTHYROXINE SODIUM 0.05 MG/1
50 TABLET ORAL DAILY
Qty: 30 TABLET | Refills: 5 | Status: SHIPPED
Start: 2022-02-04 | End: 2022-03-10 | Stop reason: SDUPTHER

## 2022-02-04 RX ORDER — BLOOD SUGAR DIAGNOSTIC
1 STRIP MISCELLANEOUS DAILY
Qty: 100 EACH | Refills: 3 | Status: SHIPPED
Start: 2022-02-04 | End: 2022-04-20 | Stop reason: SDUPTHER

## 2022-03-10 DIAGNOSIS — E11.9 TYPE 2 DIABETES MELLITUS WITHOUT COMPLICATION, WITHOUT LONG-TERM CURRENT USE OF INSULIN (HCC): ICD-10-CM

## 2022-03-10 RX ORDER — LEVOTHYROXINE SODIUM 0.05 MG/1
50 TABLET ORAL DAILY
Qty: 30 TABLET | Refills: 5 | Status: SHIPPED
Start: 2022-03-10 | End: 2022-04-11

## 2022-03-10 RX ORDER — GLIMEPIRIDE 4 MG/1
4 TABLET ORAL
Qty: 30 TABLET | Refills: 3 | Status: SHIPPED
Start: 2022-03-10 | End: 2022-04-20 | Stop reason: SDUPTHER

## 2022-03-10 RX ORDER — BLOOD-GLUCOSE METER
EACH MISCELLANEOUS
Qty: 100 EACH | Refills: 1 | Status: SHIPPED
Start: 2022-03-10 | End: 2022-04-20 | Stop reason: SDUPTHER

## 2022-03-10 RX ORDER — DULAGLUTIDE 1.5 MG/.5ML
1.5 INJECTION, SOLUTION SUBCUTANEOUS WEEKLY
Qty: 4 PEN | Refills: 2 | Status: SHIPPED
Start: 2022-03-10 | End: 2022-04-20 | Stop reason: SDUPTHER

## 2022-04-01 LAB — DIABETIC RETINOPATHY: NEGATIVE

## 2022-04-08 ENCOUNTER — OFFICE VISIT (OUTPATIENT)
Dept: PRIMARY CARE CLINIC | Age: 42
End: 2022-04-08
Payer: COMMERCIAL

## 2022-04-08 VITALS
WEIGHT: 155 LBS | HEIGHT: 60 IN | OXYGEN SATURATION: 96 % | HEART RATE: 88 BPM | TEMPERATURE: 97.1 F | SYSTOLIC BLOOD PRESSURE: 122 MMHG | DIASTOLIC BLOOD PRESSURE: 74 MMHG | BODY MASS INDEX: 30.43 KG/M2

## 2022-04-08 DIAGNOSIS — E78.2 MIXED HYPERLIPIDEMIA: ICD-10-CM

## 2022-04-08 DIAGNOSIS — E11.9 TYPE 2 DIABETES MELLITUS WITHOUT COMPLICATION, WITHOUT LONG-TERM CURRENT USE OF INSULIN (HCC): ICD-10-CM

## 2022-04-08 DIAGNOSIS — E03.9 ACQUIRED HYPOTHYROIDISM: ICD-10-CM

## 2022-04-08 DIAGNOSIS — E11.9 TYPE 2 DIABETES MELLITUS WITHOUT COMPLICATION, WITHOUT LONG-TERM CURRENT USE OF INSULIN (HCC): Primary | ICD-10-CM

## 2022-04-08 PROBLEM — M77.9 TENDONITIS: Status: RESOLVED | Noted: 2020-09-24 | Resolved: 2022-04-08

## 2022-04-08 LAB
ALBUMIN SERPL-MCNC: 4.4 G/DL (ref 3.5–5.2)
ALP BLD-CCNC: 75 U/L (ref 35–104)
ALT SERPL-CCNC: 20 U/L (ref 0–32)
ANION GAP SERPL CALCULATED.3IONS-SCNC: 18 MMOL/L (ref 7–16)
AST SERPL-CCNC: 20 U/L (ref 0–31)
BILIRUB SERPL-MCNC: 0.3 MG/DL (ref 0–1.2)
BUN BLDV-MCNC: 17 MG/DL (ref 6–20)
CALCIUM SERPL-MCNC: 9.9 MG/DL (ref 8.6–10.2)
CHLORIDE BLD-SCNC: 101 MMOL/L (ref 98–107)
CHOLESTEROL, TOTAL: 299 MG/DL (ref 0–199)
CO2: 19 MMOL/L (ref 22–29)
CREAT SERPL-MCNC: 0.7 MG/DL (ref 0.5–1)
GFR AFRICAN AMERICAN: >60
GFR NON-AFRICAN AMERICAN: >60 ML/MIN/1.73
GLUCOSE BLD-MCNC: 168 MG/DL (ref 74–99)
HBA1C MFR BLD: 8.2 % (ref 4–5.6)
HCT VFR BLD CALC: 43.8 % (ref 34–48)
HDLC SERPL-MCNC: 50 MG/DL
HEMOGLOBIN: 14.3 G/DL (ref 11.5–15.5)
LDL CHOLESTEROL CALCULATED: 198 MG/DL (ref 0–99)
MCH RBC QN AUTO: 29.5 PG (ref 26–35)
MCHC RBC AUTO-ENTMCNC: 32.6 % (ref 32–34.5)
MCV RBC AUTO: 90.5 FL (ref 80–99.9)
PDW BLD-RTO: 12.5 FL (ref 11.5–15)
PLATELET # BLD: 216 E9/L (ref 130–450)
PMV BLD AUTO: 12.4 FL (ref 7–12)
POTASSIUM SERPL-SCNC: 4.3 MMOL/L (ref 3.5–5)
RBC # BLD: 4.84 E12/L (ref 3.5–5.5)
SODIUM BLD-SCNC: 138 MMOL/L (ref 132–146)
TOTAL PROTEIN: 8.2 G/DL (ref 6.4–8.3)
TRIGL SERPL-MCNC: 257 MG/DL (ref 0–149)
TSH SERPL DL<=0.05 MIU/L-ACNC: 5.08 UIU/ML (ref 0.27–4.2)
VLDLC SERPL CALC-MCNC: 51 MG/DL
WBC # BLD: 9.1 E9/L (ref 4.5–11.5)

## 2022-04-08 PROCEDURE — 3046F HEMOGLOBIN A1C LEVEL >9.0%: CPT | Performed by: FAMILY MEDICINE

## 2022-04-08 PROCEDURE — 2022F DILAT RTA XM EVC RTNOPTHY: CPT | Performed by: FAMILY MEDICINE

## 2022-04-08 PROCEDURE — 1036F TOBACCO NON-USER: CPT | Performed by: FAMILY MEDICINE

## 2022-04-08 PROCEDURE — 99214 OFFICE O/P EST MOD 30 MIN: CPT | Performed by: FAMILY MEDICINE

## 2022-04-08 PROCEDURE — G8427 DOCREV CUR MEDS BY ELIG CLIN: HCPCS | Performed by: FAMILY MEDICINE

## 2022-04-08 PROCEDURE — G8417 CALC BMI ABV UP PARAM F/U: HCPCS | Performed by: FAMILY MEDICINE

## 2022-04-08 ASSESSMENT — PATIENT HEALTH QUESTIONNAIRE - PHQ9
SUM OF ALL RESPONSES TO PHQ QUESTIONS 1-9: 0
SUM OF ALL RESPONSES TO PHQ9 QUESTIONS 1 & 2: 0
2. FEELING DOWN, DEPRESSED OR HOPELESS: 0
1. LITTLE INTEREST OR PLEASURE IN DOING THINGS: 0
SUM OF ALL RESPONSES TO PHQ QUESTIONS 1-9: 0

## 2022-04-08 ASSESSMENT — ENCOUNTER SYMPTOMS
CONSTIPATION: 0
SORE THROAT: 0
DIARRHEA: 0
BACK PAIN: 0
COLOR CHANGE: 0
EYE PAIN: 0
COUGH: 0
ABDOMINAL PAIN: 0
SINUS PRESSURE: 0
CHEST TIGHTNESS: 0
WHEEZING: 0
NAUSEA: 0
RHINORRHEA: 0
BLOOD IN STOOL: 0
EYE REDNESS: 0
SHORTNESS OF BREATH: 0
EYE ITCHING: 0
APNEA: 0
VOMITING: 0

## 2022-04-08 NOTE — PROGRESS NOTES
Chief Complaint:     Chief Complaint   Patient presents with    6 Month Follow-Up    Diabetes    Hypothyroidism    Hyperlipidemia         Diabetes  She presents for her follow-up diabetic visit. She has type 2 diabetes mellitus. Her disease course has been stable. Hypoglycemia symptoms include headaches. Pertinent negatives for hypoglycemia include no dizziness or nervousness/anxiousness. Pertinent negatives for diabetes include no chest pain, no fatigue, no weakness and no weight loss. There are no hypoglycemic complications. Symptoms are stable. There are no diabetic complications. Risk factors for coronary artery disease include diabetes mellitus and dyslipidemia. Current diabetic treatment includes oral agent (monotherapy). She is compliant with treatment all of the time. Her weight is stable. When asked about meal planning, she reported none. There is no change in her home blood glucose trend. An ACE inhibitor/angiotensin II receptor blocker is not being taken. She does not see a podiatrist.Eye exam is not current. Hyperlipidemia  This is a chronic problem. The current episode started more than 1 month ago. The problem is controlled. Exacerbating diseases include diabetes and hypothyroidism. Pertinent negatives include no chest pain, myalgias or shortness of breath. Current antihyperlipidemic treatment includes statins. The current treatment provides significant improvement of lipids. There are no compliance problems. Risk factors for coronary artery disease include diabetes mellitus and dyslipidemia. Other  This is a recurrent (thyroid disease) problem. The current episode started more than 1 month ago. The problem occurs intermittently. The problem has been waxing and waning. Associated symptoms include headaches. Pertinent negatives include no abdominal pain, arthralgias, chest pain, congestion, coughing, fatigue, fever, myalgias, nausea, neck pain, numbness, rash, sore throat, vomiting or weakness. Nothing aggravates the symptoms. She has tried nothing for the symptoms. The treatment provided no relief. Patient Active Problem List   Diagnosis    Gastroesophageal reflux disease without esophagitis    Hiatal hernia    Carpal tunnel syndrome of right wrist    Type 2 diabetes mellitus without complication, without long-term current use of insulin ([address]    Acquired hypothyroidism    Mixed hyperlipidemia    Retinal artery occlusion       Past Medical History:   Diagnosis Date    Carpal tunnel syndrome, right     Diabetes mellitus ([address]     GERD (gastroesophageal reflux disease)     Hyperlipidemia     Hypothyroidism        Past Surgical History:   Procedure Laterality Date    CARPAL TUNNEL RELEASE Right 3/18/2020    RIGHT CARPAL TUNNEL RELEASE performed by Jg Kerr MD at [facility], 5/> YRS  7/4/2020            Current Outpatient Medications   Medication Sig Dispense Refill    LIFESCAN FINEPOINT LANCETS MISC Test TID with meals 100 each 1    levothyroxine (SYNTHROID) 50 MCG tablet Take 1 tablet by mouth daily 30 tablet 5    glimepiride (AMARYL) 4 MG tablet Take 1 tablet by mouth every morning (before breakfast) 30 tablet 3    Dulaglutide (TRULICITY) 1.5 IS/3.2UN SOPN Inject 1.5 mg into the skin once a week 4 pen 2    blood glucose test strips (ONETOUCH ULTRA) strip 1 each by In Vitro route daily As needed. 100 each 3    atorvastatin (LIPITOR) 20 MG tablet Take 1 tablet by mouth daily 30 tablet 5    fluticasone (FLONASE) 50 MCG/ACT nasal spray 2 sprays by Each Nostril route daily 1 each 1    Blood Glucose Monitoring Suppl (ONE TOUCH ULTRA 2) w/Device KIT 1 kit by Does not apply route daily 1 kit 0    ibuprofen (ADVIL;MOTRIN) 600 MG tablet Take 1 tablet by mouth 3 times daily as needed for Pain 90 tablet 0     No current facility-administered medications for this visit.        Allergies   Allergen Reactions    Iodine Anaphylaxis    Shellfish-Derived Products Anaphylaxis, Hives, Itching and Rash       Social History     Socioeconomic History    Marital status:      Spouse name: None    Number of children: None    Years of education: None    Highest education level: None   Occupational History    None   Tobacco Use    Smoking status: Never Smoker    Smokeless tobacco: Never Used   Vaping Use    Vaping Use: Never used   Substance and Sexual Activity    Alcohol use: Never    Drug use: Never    Sexual activity: None   Other Topics Concern    None   Social History Narrative    None     Social Determinants of Health     Financial Resource Strain:     Difficulty of Paying Living Expenses: Not on file   Food Insecurity:     Worried About Running Out of Food in the Last Year: Not on file    Freya of Food in the Last Year: Not on file   Transportation Needs:     Lack of Transportation (Medical): Not on file    Lack of Transportation (Non-Medical):  Not on file   Physical Activity:     Days of Exercise per Week: Not on file    Minutes of Exercise per Session: Not on file   Stress:     Feeling of Stress : Not on file   Social Connections:     Frequency of Communication with Friends and Family: Not on file    Frequency of Social Gatherings with Friends and Family: Not on file    Attends Shinto Services: Not on file    Active Member of [address] [place] [address] [employer] or Organizations: Not on file    Attends Club or Organization Meetings: Not on file    Marital Status: Not on file   Intimate Partner Violence:     Fear of Current or Ex-Partner: Not on file    Emotionally Abused: Not on file    Physically Abused: Not on file    Sexually Abused: Not on file   Housing Stability:     Unable to Pay for Housing in the Last Year: Not on file    Number of [place] in the Last Year: Not on file    Unstable Housing in the Last Year: Not on file       Family History   Problem Relation Age of Onset    Other Mother         aneurysm    Stroke Father     Heart Attack Father  No Known Problems Sister     Diabetes Brother     Heart Attack Brother 48    No Known Problems Sister           Review of Systems   Constitutional: Negative for activity change, appetite change, fatigue, fever and weight loss. HENT: Negative for congestion, ear pain, hearing loss, nosebleeds, rhinorrhea, sinus pressure and sore throat. Eyes: Negative for pain, redness, itching and visual disturbance. Respiratory: Negative for apnea, cough, chest tightness, shortness of breath and wheezing. Cardiovascular: Negative for chest pain, palpitations and leg swelling. Gastrointestinal: Negative for abdominal pain, blood in stool, constipation, diarrhea, nausea and vomiting. Endocrine: Negative. Genitourinary: Negative for decreased urine volume, difficulty urinating, dysuria, frequency, hematuria and urgency. Musculoskeletal: Negative for arthralgias, back pain, gait problem, myalgias and neck pain. Skin: Negative for color change and rash. Allergic/Immunologic: Negative for environmental allergies and food allergies. Neurological: Positive for headaches. Negative for dizziness, weakness, light-headedness and numbness. Hematological: Negative for adenopathy. Does not bruise/bleed easily. Psychiatric/Behavioral: Negative for behavioral problems, dysphoric mood and sleep disturbance. The patient is not nervous/anxious and is not hyperactive. All other systems reviewed and are negative. /74   Pulse 88   Temp 97.1 °F (36.2 °C)   Ht 5' (1.524 m)   Wt 155 lb (70.3 kg)   SpO2 96%   BMI 30.27 kg/m²     Physical Exam  Vitals and nursing note reviewed. Constitutional:       General: She is not in acute distress. Appearance: Normal appearance. She is well-developed. HENT:      Head: Normocephalic and atraumatic. Right Ear: Hearing, tympanic membrane and external ear normal. No tenderness. No middle ear effusion.       Left Ear: Hearing, tympanic membrane and external ear normal. No tenderness. No middle ear effusion. Nose: Nose normal. No congestion or rhinorrhea. Right Turbinates: Not enlarged. Left Turbinates: Not enlarged. Mouth/Throat:      Mouth: Mucous membranes are moist.      Tongue: No lesions. Pharynx: Oropharynx is clear. No oropharyngeal exudate or posterior oropharyngeal erythema. Eyes:      General: No scleral icterus. Conjunctiva/sclera: Conjunctivae normal.      Pupils: Pupils are equal, round, and reactive to light. Neck:      Thyroid: No thyromegaly. Cardiovascular:      Rate and Rhythm: Normal rate and regular rhythm. Heart sounds: Normal heart sounds. No murmur heard. Pulmonary:      Effort: Pulmonary effort is normal. No respiratory distress. Breath sounds: Normal breath sounds. No wheezing or rales. Abdominal:      General: Bowel sounds are normal. There is no distension. Palpations: Abdomen is soft. Tenderness: There is no abdominal tenderness. Musculoskeletal:         General: No tenderness. Normal range of motion. Cervical back: Normal range of motion and neck supple. No rigidity. No muscular tenderness. Lymphadenopathy:      Cervical: No cervical adenopathy. Skin:     General: Skin is warm and dry. Findings: No erythema or rash. Neurological:      General: No focal deficit present. Mental Status: She is alert and oriented to person, place, and time. Cranial Nerves: No cranial nerve deficit. Deep Tendon Reflexes: Reflexes are normal and symmetric.  Reflexes normal.   Psychiatric:         Mood and Affect: Mood normal.                                 ASSESSMENT/PLAN:    Patient Active Problem List   Diagnosis    Gastroesophageal reflux disease without esophagitis    Hiatal hernia    Carpal tunnel syndrome of right wrist    Type 2 diabetes mellitus without complication, without long-term current use of insulin ([address]    Acquired hypothyroidism    Mixed hyperlipidemia    Retinal artery occlusion       Abigail was seen today for 6 month follow-up, diabetes, hypothyroidism and hyperlipidemia. Diagnoses and all orders for this visit:    Type 2 diabetes mellitus without complication, without long-term current use of insulin (HCC)  -     CBC; Future  -     Comprehensive Metabolic Panel; Future  -     Hemoglobin A1C; Future  -     Lipid Panel; Future  -     TSH; Future    Acquired hypothyroidism  -     TSH; Future    Mixed hyperlipidemia  -     CBC; Future  -     Comprehensive Metabolic Panel; Future  -     Lipid Panel; Future          Return in about 6 months (around 10/8/2022) for Follow up DM, Recheck labs, Recheck Meds. I spent 30 minutes with this patient. I spent greater than 50% of the time counseling this patient.         Hattie Moreno DO  4/8/2022  9:14 AM

## 2022-04-11 RX ORDER — LEVOTHYROXINE SODIUM 0.07 MG/1
75 TABLET ORAL DAILY
Qty: 90 TABLET | Refills: 1 | Status: SHIPPED
Start: 2022-04-11 | End: 2022-05-22 | Stop reason: SDUPTHER

## 2022-04-20 DIAGNOSIS — E11.9 TYPE 2 DIABETES MELLITUS WITHOUT COMPLICATION, WITHOUT LONG-TERM CURRENT USE OF INSULIN (HCC): ICD-10-CM

## 2022-04-20 RX ORDER — BLOOD-GLUCOSE METER
EACH MISCELLANEOUS
Qty: 100 EACH | Refills: 1 | Status: SHIPPED
Start: 2022-04-20 | End: 2022-05-22 | Stop reason: SDUPTHER

## 2022-04-20 RX ORDER — GLIMEPIRIDE 4 MG/1
4 TABLET ORAL
Qty: 30 TABLET | Refills: 3 | Status: SHIPPED
Start: 2022-04-20 | End: 2022-05-22 | Stop reason: SDUPTHER

## 2022-04-20 RX ORDER — BLOOD SUGAR DIAGNOSTIC
1 STRIP MISCELLANEOUS DAILY
Qty: 100 EACH | Refills: 3 | Status: SHIPPED
Start: 2022-04-20 | End: 2022-05-22 | Stop reason: SDUPTHER

## 2022-04-20 RX ORDER — FLUTICASONE PROPIONATE 50 MCG
2 SPRAY, SUSPENSION (ML) NASAL DAILY
Qty: 1 EACH | Refills: 1 | Status: SHIPPED
Start: 2022-04-20 | End: 2022-05-22 | Stop reason: SDUPTHER

## 2022-04-20 RX ORDER — ATORVASTATIN CALCIUM 20 MG/1
20 TABLET, FILM COATED ORAL DAILY
Qty: 30 TABLET | Refills: 5 | Status: SHIPPED
Start: 2022-04-20 | End: 2022-09-19 | Stop reason: SDUPTHER

## 2022-04-20 RX ORDER — DULAGLUTIDE 1.5 MG/.5ML
1.5 INJECTION, SOLUTION SUBCUTANEOUS WEEKLY
Qty: 4 PEN | Refills: 2 | Status: SHIPPED
Start: 2022-04-20 | End: 2022-05-22 | Stop reason: SDUPTHER

## 2022-05-02 ENCOUNTER — OFFICE VISIT (OUTPATIENT)
Dept: ORTHOPEDIC SURGERY | Age: 42
End: 2022-05-02
Payer: MEDICARE

## 2022-05-02 VITALS — HEIGHT: 61 IN | WEIGHT: 148 LBS | BODY MASS INDEX: 27.94 KG/M2

## 2022-05-02 DIAGNOSIS — M65.331 ACQUIRED TRIGGER FINGER OF BOTH MIDDLE FINGERS: Primary | ICD-10-CM

## 2022-05-02 DIAGNOSIS — M65.332 ACQUIRED TRIGGER FINGER OF BOTH MIDDLE FINGERS: Primary | ICD-10-CM

## 2022-05-02 PROCEDURE — 20550 NJX 1 TENDON SHEATH/LIGAMENT: CPT | Performed by: ORTHOPAEDIC SURGERY

## 2022-05-02 RX ORDER — BETAMETHASONE SODIUM PHOSPHATE AND BETAMETHASONE ACETATE 3; 3 MG/ML; MG/ML
12 INJECTION, SUSPENSION INTRA-ARTICULAR; INTRALESIONAL; INTRAMUSCULAR; SOFT TISSUE ONCE
Status: COMPLETED | OUTPATIENT
Start: 2022-05-02 | End: 2022-05-02

## 2022-05-02 RX ADMIN — BETAMETHASONE SODIUM PHOSPHATE AND BETAMETHASONE ACETATE 12 MG: 3; 3 INJECTION, SUSPENSION INTRA-ARTICULAR; INTRALESIONAL; INTRAMUSCULAR; SOFT TISSUE at 17:36

## 2022-05-02 NOTE — PROGRESS NOTES
Chief Complaint   Patient presents with    Trigger finger     bilateral middle finger trigger - requesting injections          Heather Pitts is a 42y.o. year old  who presents with new onset right middle and thumb triggers. She reports these began several months ago and have worsened. She is now at the point where she has constant stiffness in the thumb and middle fingers on the right. Recently she started developing similar symptoms in the contralateral left middle finger. She reports active triggering in the mornings which improves but is persistent throughout the day. She is developing a PIP joint contracture on the left middle finger. She has not had any treatment for these. She is tried over-the-counter Tylenol and Motrin without improvement. She reports she is still doing well status post a right carpal tunnel release with only intermittent symptoms over the forearm region. In October the patient had bilateral middle finger triggers and her right thumb trigger injection. Patient states this helped her symptoms until the last month. Her symptoms are starting to return to bilateral middle fingers. She is requesting repeat injections at today's visit. She reports no symptoms to her thumb. She is not interested in surgical management. Past Medical History:   Diagnosis Date    Carpal tunnel syndrome, right     Diabetes mellitus (HCC)     GERD (gastroesophageal reflux disease)     Hyperlipidemia     Hypothyroidism      Past Surgical History:   Procedure Laterality Date    CARPAL TUNNEL RELEASE Right 3/18/2020    RIGHT CARPAL TUNNEL RELEASE performed by Lani Virgen MD at [facility], 5/> YRS  7/4/2020            Current Outpatient Medications:     blood glucose test strips (ONETOUCH ULTRA) strip, 1 each by In Vitro route daily As needed. , Disp: 100 each, Rfl: 3    atorvastatin (LIPITOR) 20 MG tablet, Take 1 tablet by mouth daily, Disp: 30 tablet, Rfl: 5   fluticasone (FLONASE) 50 MCG/ACT nasal spray, 2 sprays by Each Nostril route daily, Disp: 1 each, Rfl: 1    LIFESCAN FINEPOINT LANCETS MISC, Test TID with meals, Disp: 100 each, Rfl: 1    glimepiride (AMARYL) 4 MG tablet, Take 1 tablet by mouth every morning (before breakfast), Disp: 30 tablet, Rfl: 3    Dulaglutide (TRULICITY) 1.5 UL/1.2JC SOPN, Inject 1.5 mg into the skin once a week, Disp: 4 pen, Rfl: 2    levothyroxine (SYNTHROID) 75 MCG tablet, Take 1 tablet by mouth daily, Disp: 90 tablet, Rfl: 1    Blood Glucose Monitoring Suppl (ONE TOUCH ULTRA 2) w/Device KIT, 1 kit by Does not apply route daily, Disp: 1 kit, Rfl: 0    ibuprofen (ADVIL;MOTRIN) 600 MG tablet, Take 1 tablet by mouth 3 times daily as needed for Pain, Disp: 90 tablet, Rfl: 0  Allergies   Allergen Reactions    Iodine Anaphylaxis    Shellfish-Derived Products Anaphylaxis, Hives, Itching and Rash     Social History     Socioeconomic History    Marital status:      Spouse name: Not on file    Number of children: Not on file    Years of education: Not on file    Highest education level: Not on file   Occupational History    Not on file   Tobacco Use    Smoking status: Never Smoker    Smokeless tobacco: Never Used   Vaping Use    Vaping Use: Never used   Substance and Sexual Activity    Alcohol use: Never    Drug use: Never    Sexual activity: Not on file   Other Topics Concern    Not on file   Social History Narrative    Not on file     Social Determinants of Health     Financial Resource Strain:     Difficulty of Paying Living Expenses: Not on file   Food Insecurity:     Worried About Running Out of Food in the Last Year: Not on file    Freya of Food in the Last Year: Not on file   Transportation Needs:     Lack of Transportation (Medical): Not on file    Lack of Transportation (Non-Medical):  Not on file   Physical Activity:     Days of Exercise per Week: Not on file    Minutes of Exercise per Session: Not on file   Stress:     Feeling of Stress : Not on file   Social Connections:     Frequency of Communication with Friends and Family: Not on file    Frequency of Social Gatherings with Friends and Family: Not on file    Attends Yarsanism Services: Not on file    Active Member of Clubs or Organizations: Not on file    Attends Club or Organization Meetings: Not on file    Marital Status: Not on file   Intimate Partner Violence:     Fear of Current or Ex-Partner: Not on file    Emotionally Abused: Not on file    Physically Abused: Not on file    Sexually Abused: Not on file   Housing Stability:     Unable to Pay for Housing in the Last Year: Not on file    Number of [place] in the Last Year: Not on file    Unstable Housing in the Last Year: Not on file     Family History   Problem Relation Age of Onset    Other Mother         aneurysm    Stroke Father     Heart Attack Father     No Known Problems Sister     Diabetes Brother     Heart Attack Brother 48    No Known Problems Sister        Skin: (-) rash,(-) psoriasis,(-) eczema, (-)skin cancer. Musculoskeletal: Bilateral hand triggers  Neurologic: (-) epilepsy, (-)seizures,(-) brain tumor,(-) TIA, (-)stroke, (-)headaches, (-)Parkinson disease,(-) memory loss, (-) LOC. Cardiovascular: (-) Chest pain, (-) swelling in legs/feet, (-) SOB, (-) cramping in legs/feet with walking. SUBJECTIVE:      Constitutional:    The patient is alert and oriented x 3, appears to be stated age and in no distress. Right upper extremity: Nontender about shoulder and elbow region. Well-healed scar over the carpal tunnel. Tenderness over the A1 pulleys of the thumb and middle fingers with active triggering of the middle fingers. She does have some diffuse swelling of the  middle fingers and mildly limited terminal flexion of the middle finger. PIP joint contracture of the middle finger measuring 25 degrees. Negative [facility] grind test. Negative atrophy.  APB strength 5/5. Negative Wartenberg's cross finger testing. Nontender of the index ring and little finger A1 pulleys. Radial, ulnar, median nerves grossly intact. 2+ radial pulse. Left upper extremity: Nontender about the elbow and wrist region. Active triggering of the middle finger. There is a PIP joint flexion contracture measuring approximate 15 degrees. She does have active triggering and tenderness over the A1 pulley. Radial, ulnar, median nerves are grossly intact. Nontender of the thumb and index ring and little fingers. Negative atrophy. Wartenberg's cross finger testing. Otherwise neurovascular intact. Xrays: X-rays of the bilateral hands dated October 8, 2021 reviewed. AP lateral multiple obliques were reviewed. There is negative fracture dislocations in the bilateral hands. Radiographic findings reviewed with patient      Impression:   Encounter Diagnosis   Name Primary?  Acquired trigger finger of both middle fingers Yes   Diabetes  Carpal tunnel syndrome  GERD    Plan:     Discussed pain to the patient. Discussed conservative and surgical management with patient. Patient would like to try 1 more cortisone injection prior to proceeding with surgical management. Injections provided to bilateral middle finger triggers at today's visit. Patient consented and this was tolerated well. Did discuss if symptoms return we will plan for trigger releases. At the same setting we could address her carpal tunnel. All questions answered. Procedure Note Trigger Finger Injection    The bilateral Middle finger A1 pullies were identified as the injection sites. The risk and benefits of a cortisone injection were explained and the patient consented to the injection. Under sterile conditions, each digit was injected with a mixture of 1 mL of 1% Lidocaine and 1 mL of 6 mg/mL Betamethasone without complication. A sterile bandage was applied.       I have seen and evaluated the patient and agree with the above assessment and plan on today's visit. I have performed the key components of the history and physical examination with significant findings of bilateral middle finger triggers. Discussed diabetes and trigger fingers. She understands that injections for triggers is less reliable in the setting of diabetes. Discussed need for monitoring blood sugars after cortisone injections. Discussed probable need for trigger release surgery . I concur with the findings and plan as documented.     Tiffani Anderson MD  5/2/2022

## 2022-05-02 NOTE — PATIENT INSTRUCTIONS
Patient Education        Trigger Finger: Care Instructions  Overview  A trigger finger is a finger stuck in a bent position. It happens when the tendon that bends and straightens the thumb or finger can't slide smoothly under the ligaments that hold the tendon against the bones. In most cases, it's caused by a bump (nodule) that forms on the tendon. The bent finger usuallystraightens out on its own. A trigger finger can be painful. But it normally isn't a serious problem. Trigger fingers seem to occur more in some groups of people. These groupsinclude:   People who have diabetes or arthritis.  People who have injured their hands in the past.   Musicians.  People who  tools often. Rest and exercises may help your finger relax so that it can bend. You may get a corticosteroid shot. This can reduce swelling and pain. Your doctor may put a splint on your finger. It will give your finger some rest. Silvia Meth need surgery if the finger keeps locking in a bent position. Follow-up care is a key part of your treatment and safety. Be sure to make and go to all appointments, and call your doctor if you are having problems. It's also a good idea to know your test results and keep alist of the medicines you take. How can you care for yourself at home?  If your doctor put a splint on your finger, wear it as directed. Don't take it off until your doctor says you can.  You may need to change your activities to avoid movements that irritate the finger.  Take your medicines exactly as prescribed. Call your doctor if you think you are having a problem with your medicine.  Ask your doctor if you can take an over-the-counter pain medicine, such as acetaminophen (Tylenol), ibuprofen (Advil, Motrin), or naproxen (Aleve). Be safe with medicines. Read and follow all instructions on the label.  If your doctor recommends exercises, do them as directed. When should you call for help?    Call your doctor now or seek immediate medical care if:     Your finger locks in a bent position and will not straighten. Watch closely for changes in your health, and be sure to contact your doctor if:     You do not get better as expected. Where can you learn more? Go to https://[email or web address].[email or web address] org and sign in to your [employer] account. Enter M826 in the [employer] box to learn more about \"Trigger Finger: Care Instructions. \"     If you do not have an account, please click on the \"Sign Up Now\" link. Current as of: July 1, 2021               Content Version: 13.2  © 2006-2022 [employer]. Care instructions adapted under license by [facility] ([facility]). If you have questions about a medical condition or this instruction, always ask your healthcare professional. [address] any warranty or liability for your use of this information. Patient Education        Trigger Finger Release: Before Your Surgery  What is trigger finger release? Trigger finger release is surgery to make it easier to bend and straighten your finger. Your doctor will make a cut in the tissue over the tendon that helps bend your finger. This cut is called an incision. It will allow the tendon tomove freely without pain. This surgery will probably be done while you are awake. The doctor will give you a shot (injection) to numb your hand and prevent pain. You also may getmedicine to help you relax. The doctor will make an incision in the skin of your finger or palm. He or she will make a cut to open the tissue over the swollen part of the tendon. The doctor will close the skin incision with stitches. After surgery, you will havea small scar on your finger or palm. This will fade with time. It will probably take about 6 weeks for your hand to heal. After it heals, yourfinger may move easily without pain. You will go home the same day as the surgery. How soon you can return to work depends on your job.  If you can do your job without using your hand, you may be able to go back in 1 or 2 days. But if your job requires you to do repeated finger or hand movements, put pressure on your hand, or lift things, you mayneed to take more time off work. Follow-up care is a key part of your treatment and safety. Be sure to make and go to all appointments, and call your doctor if you are having problems. It's also a good idea to know your test results and keep alist of the medicines you take. How do you prepare for surgery? Surgery can be stressful. This information will help you understand what youcan expect. And it will help you safely prepare for surgery. Preparing for surgery     Be sure you have someone to take you home. Anesthesia and pain medicine will make it unsafe for you to drive or get home on your own.      Understand exactly what surgery is planned, along with the risks, benefits, and other options.      If you take aspirin or some other blood thinner, ask your doctor if you should stop taking it before your surgery. Make sure that you understand exactly what your doctor wants you to do. These medicines increase the risk of bleeding.      Tell your doctor ALL the medicines, vitamins, supplements, and herbal remedies you take. Some may increase the risk of problems during your surgery. Your doctor will tell you if you should stop taking any of them before the surgery and how soon to do it.      Make sure your doctor and the hospital have a copy of your advance directive. If you don't have one, you may want to prepare one. It lets others know your health care wishes. It's a good thing to have before any type of surgery or procedure. What happens on the day of surgery?     Follow the instructions exactly about when to stop eating and drinking. If you don't, your surgery may be canceled.  If your doctor told you to take your medicines on the day of surgery, take them with only a sip of water.      Follow your doctor's instructions about when to bathe or shower before your surgery. Do not apply lotions, perfumes, deodorants, or nail polish.      Do not shave the surgical site yourself.      Take off all jewelry and piercings. And take out contact lenses, if you wear them. At the hospital or surgery center     Bring a picture ID. .      The area for surgery is often marked to make sure there are no errors.      You will be kept comfortable and safe by your anesthesia provider. You may get medicine that relaxes you or puts you in a light sleep. The area being worked on will be numb.      The surgery will take less than 1 hour. When should you call your doctor?  You have questions or concerns.      You don't understand how to prepare for your surgery.      You become ill before the surgery (such as fever, flu, or a cold).      You need to reschedule or have changed your mind about having the surgery. Where can you learn more? Go to https://Anatole.Antengo. org and sign in to your [email or web address] / [email or web address] account. Enter F208 in the [employer] box to learn more about \"Trigger Finger Release: Before Your Surgery. \"     If you do not have an account, please click on the \"Sign Up Now\" link. Current as of: July 1, 2021               Content Version: 13.2  © 2006-2022 [employer]. Care instructions adapted under license by [facility] ([facility]). If you have questions about a medical condition or this instruction, always ask your healthcare professional. [place] [address] any warranty or liability for your use of this information.

## 2022-05-22 DIAGNOSIS — E11.9 TYPE 2 DIABETES MELLITUS WITHOUT COMPLICATION, WITHOUT LONG-TERM CURRENT USE OF INSULIN (HCC): ICD-10-CM

## 2022-05-23 RX ORDER — LEVOTHYROXINE SODIUM 0.07 MG/1
75 TABLET ORAL DAILY
Qty: 90 TABLET | Refills: 1 | Status: SHIPPED
Start: 2022-05-23 | End: 2022-06-18 | Stop reason: SDUPTHER

## 2022-05-23 RX ORDER — GLIMEPIRIDE 4 MG/1
4 TABLET ORAL
Qty: 30 TABLET | Refills: 3 | Status: SHIPPED
Start: 2022-05-23 | End: 2022-06-18 | Stop reason: SDUPTHER

## 2022-05-23 RX ORDER — DULAGLUTIDE 1.5 MG/.5ML
1.5 INJECTION, SOLUTION SUBCUTANEOUS WEEKLY
Qty: 4 PEN | Refills: 2 | Status: SHIPPED
Start: 2022-05-23 | End: 2022-06-18 | Stop reason: SDUPTHER

## 2022-05-23 RX ORDER — FLUTICASONE PROPIONATE 50 MCG
2 SPRAY, SUSPENSION (ML) NASAL DAILY
Qty: 1 EACH | Refills: 1 | Status: SHIPPED
Start: 2022-05-23 | End: 2022-06-18 | Stop reason: SDUPTHER

## 2022-05-23 RX ORDER — BLOOD SUGAR DIAGNOSTIC
1 STRIP MISCELLANEOUS DAILY
Qty: 100 EACH | Refills: 3 | Status: SHIPPED
Start: 2022-05-23 | End: 2022-06-18 | Stop reason: SDUPTHER

## 2022-05-23 RX ORDER — BLOOD-GLUCOSE METER
EACH MISCELLANEOUS
Qty: 100 EACH | Refills: 1 | Status: SHIPPED
Start: 2022-05-23 | End: 2022-09-19 | Stop reason: SDUPTHER

## 2022-06-18 DIAGNOSIS — E11.9 TYPE 2 DIABETES MELLITUS WITHOUT COMPLICATION, WITHOUT LONG-TERM CURRENT USE OF INSULIN (HCC): ICD-10-CM

## 2022-06-20 RX ORDER — DULAGLUTIDE 1.5 MG/.5ML
1.5 INJECTION, SOLUTION SUBCUTANEOUS WEEKLY
Qty: 4 PEN | Refills: 2 | Status: SHIPPED
Start: 2022-06-20 | End: 2022-07-18 | Stop reason: SDUPTHER

## 2022-06-20 RX ORDER — GLIMEPIRIDE 4 MG/1
4 TABLET ORAL
Qty: 30 TABLET | Refills: 3 | Status: SHIPPED
Start: 2022-06-20 | End: 2022-07-18 | Stop reason: SDUPTHER

## 2022-06-20 RX ORDER — LEVOTHYROXINE SODIUM 0.07 MG/1
75 TABLET ORAL DAILY
Qty: 90 TABLET | Refills: 1 | Status: SHIPPED
Start: 2022-06-20 | End: 2022-07-18 | Stop reason: SDUPTHER

## 2022-06-20 RX ORDER — BLOOD SUGAR DIAGNOSTIC
1 STRIP MISCELLANEOUS DAILY
Qty: 100 EACH | Refills: 3 | Status: SHIPPED
Start: 2022-06-20 | End: 2022-07-18 | Stop reason: SDUPTHER

## 2022-06-20 RX ORDER — BLOOD-GLUCOSE METER
1 EACH MISCELLANEOUS DAILY
Qty: 1 KIT | Refills: 0 | Status: SHIPPED
Start: 2022-06-20 | End: 2022-08-22 | Stop reason: SDUPTHER

## 2022-06-20 RX ORDER — FLUTICASONE PROPIONATE 50 MCG
2 SPRAY, SUSPENSION (ML) NASAL DAILY
Qty: 1 EACH | Refills: 1 | Status: SHIPPED
Start: 2022-06-20 | End: 2022-07-18 | Stop reason: SDUPTHER

## 2022-07-05 ENCOUNTER — TELEPHONE (OUTPATIENT)
Dept: PRIMARY CARE CLINIC | Age: 42
End: 2022-07-05

## 2022-07-05 ENCOUNTER — OFFICE VISIT (OUTPATIENT)
Dept: FAMILY MEDICINE CLINIC | Age: 42
End: 2022-07-05
Payer: COMMERCIAL

## 2022-07-05 VITALS
BODY MASS INDEX: 28.7 KG/M2 | HEIGHT: 61 IN | SYSTOLIC BLOOD PRESSURE: 122 MMHG | DIASTOLIC BLOOD PRESSURE: 70 MMHG | HEART RATE: 68 BPM | RESPIRATION RATE: 16 BRPM | WEIGHT: 152 LBS | OXYGEN SATURATION: 98 %

## 2022-07-05 DIAGNOSIS — S93.401A SPRAIN OF RIGHT ANKLE, UNSPECIFIED LIGAMENT, INITIAL ENCOUNTER: ICD-10-CM

## 2022-07-05 DIAGNOSIS — S93.601A SPRAIN OF RIGHT FOOT, INITIAL ENCOUNTER: Primary | ICD-10-CM

## 2022-07-05 PROCEDURE — 1036F TOBACCO NON-USER: CPT | Performed by: FAMILY MEDICINE

## 2022-07-05 PROCEDURE — G8417 CALC BMI ABV UP PARAM F/U: HCPCS | Performed by: FAMILY MEDICINE

## 2022-07-05 PROCEDURE — G8427 DOCREV CUR MEDS BY ELIG CLIN: HCPCS | Performed by: FAMILY MEDICINE

## 2022-07-05 PROCEDURE — 99213 OFFICE O/P EST LOW 20 MIN: CPT | Performed by: FAMILY MEDICINE

## 2022-07-05 ASSESSMENT — ENCOUNTER SYMPTOMS
EYE PAIN: 0
RHINORRHEA: 0
NAUSEA: 0
ABDOMINAL PAIN: 0
COLOR CHANGE: 0
EYE REDNESS: 0
SORE THROAT: 0
APNEA: 0
BLOOD IN STOOL: 0
WHEEZING: 0
CHEST TIGHTNESS: 0
SINUS PRESSURE: 0
SHORTNESS OF BREATH: 0
VOMITING: 0
DIARRHEA: 0
CONSTIPATION: 0
EYE ITCHING: 0
COUGH: 0
BACK PAIN: 0

## 2022-07-05 NOTE — TELEPHONE ENCOUNTER
The pt is calling because she can hardly stand on her foot and if the xray is showing nothing is wrong what is going on with it and what is the next step

## 2022-07-05 NOTE — PROGRESS NOTES
Chief Complaint:     Chief Complaint   Patient presents with    Foot Pain     right foot, swollen, started sunday afternoon         Foot Pain   The pain is present in the right foot and right toes. This is a new problem. The current episode started in the past 7 days. The problem occurs daily. The problem has been unchanged. The quality of the pain is described as aching. The pain is moderate. Associated symptoms include an inability to bear weight, joint swelling, a limited range of motion and stiffness. Pertinent negatives include no fever or numbness. The symptoms are aggravated by activity and standing. She has tried nothing for the symptoms. The treatment provided no relief. Patient Active Problem List   Diagnosis    Gastroesophageal reflux disease without esophagitis    Hiatal hernia    Carpal tunnel syndrome of right wrist    Type 2 diabetes mellitus without complication, without long-term current use of insulin ([address]    Acquired hypothyroidism    Mixed hyperlipidemia    Retinal artery occlusion       Past Medical History:   Diagnosis Date    Carpal tunnel syndrome, right     Diabetes mellitus ([address]     GERD (gastroesophageal reflux disease)     Hyperlipidemia     Hypothyroidism        Past Surgical History:   Procedure Laterality Date    CARPAL TUNNEL RELEASE Right 3/18/2020    RIGHT CARPAL TUNNEL RELEASE performed by Arthur Zambrano MD at [address] [facility], 5/> YRS  7/4/2020            Current Outpatient Medications   Medication Sig Dispense Refill    Blood Glucose Monitoring Suppl (ONE TOUCH ULTRA 2) w/Device KIT 1 kit by Does not apply route daily 1 kit 0    levothyroxine (SYNTHROID) 75 MCG tablet Take 1 tablet by mouth daily 90 tablet 1    blood glucose test strips (ONETOUCH ULTRA) strip 1 each by In Vitro route daily As needed.  100 each 3    fluticasone (FLONASE) 50 MCG/ACT nasal spray 2 sprays by Each Nostril route daily 1 each 1    glimepiride (AMARYL) 4 MG tablet Take 1 tablet by mouth every morning (before breakfast) 30 tablet 3    Dulaglutide (TRULICITY) 1.5 TH/1.5VP SOPN Inject 1.5 mg into the skin once a week 4 pen 2    L[employer] FINEPOINT LANCETS MISC Test TID with meals 100 each 1    atorvastatin (LIPITOR) 20 MG tablet Take 1 tablet by mouth daily 30 tablet 5    ibuprofen (ADVIL;MOTRIN) 600 MG tablet Take 1 tablet by mouth 3 times daily as needed for Pain 90 tablet 0     No current facility-administered medications for this visit. Allergies   Allergen Reactions    Iodine Anaphylaxis    Shellfish-Derived Products Anaphylaxis, Hives, Itching and Rash       Social History     Socioeconomic History    Marital status:      Spouse name: None    Number of children: None    Years of education: None    Highest education level: None   Occupational History    None   Tobacco Use    Smoking status: Never Smoker    Smokeless tobacco: Never Used   Vaping Use    Vaping Use: Never used   Substance and Sexual Activity    Alcohol use: Never    Drug use: Never    Sexual activity: None   Other Topics Concern    None   Social History Narrative    None     Social Determinants of Health     Financial Resource Strain:     Difficulty of Paying Living Expenses: Not on file   Food Insecurity:     Worried About Running Out of Food in the Last Year: Not on file    Freya of Food in the Last Year: Not on file   Transportation Needs:     Lack of Transportation (Medical): Not on file    Lack of Transportation (Non-Medical):  Not on file   Physical Activity:     Days of Exercise per Week: Not on file    Minutes of Exercise per Session: Not on file   Stress:     Feeling of Stress : Not on file   Social Connections:     Frequency of Communication with Friends and Family: Not on file    Frequency of Social Gatherings with Friends and Family: Not on file    Attends [employer] Services: Not on file    Active Member of Clubs or Organizations: Not on file   Brianda Whatley Attends Club or Organization Meetings: Not on file    Marital Status: Not on file   Intimate Partner Violence:     Fear of Current or Ex-Partner: Not on file    Emotionally Abused: Not on file    Physically Abused: Not on file    Sexually Abused: Not on file   Housing Stability:     Unable to Pay for Housing in the Last Year: Not on file    Number of [place] in the Last Year: Not on file    Unstable Housing in the Last Year: Not on file       Family History   Problem Relation Age of Onset    Other Mother         aneurysm    Stroke Father     Heart Attack Father     No Known Problems Sister     Diabetes Brother     Heart Attack Brother 48    No Known Problems Sister           Review of Systems   Constitutional: Negative for activity change, appetite change, fatigue and fever. HENT: Negative for congestion, ear pain, hearing loss, nosebleeds, rhinorrhea, sinus pressure and sore throat. Eyes: Negative for pain, redness, itching and visual disturbance. Respiratory: Negative for apnea, cough, chest tightness, shortness of breath and wheezing. Cardiovascular: Negative for chest pain, palpitations and leg swelling. Gastrointestinal: Negative for abdominal pain, blood in stool, constipation, diarrhea, nausea and vomiting. Endocrine: Negative. Genitourinary: Negative for decreased urine volume, difficulty urinating, dysuria, frequency, hematuria and urgency. Musculoskeletal: Positive for stiffness. Negative for arthralgias, back pain, gait problem, myalgias and neck pain. Skin: Negative for color change and rash. Allergic/Immunologic: Negative for environmental allergies and food allergies. Neurological: Negative for dizziness, weakness, light-headedness, numbness and headaches. Hematological: Negative for adenopathy. Does not bruise/bleed easily. Psychiatric/Behavioral: Negative for behavioral problems, dysphoric mood and sleep disturbance.  The patient is not nervous/anxious and is not hyperactive. All other systems reviewed and are negative. /70   Pulse 68   Resp 16   Ht 5' 1\" (1.549 m)   Wt 152 lb (68.9 kg)   SpO2 98%   BMI 28.72 kg/m²     Physical Exam  Vitals and nursing note reviewed. Constitutional:       General: She is not in acute distress. Appearance: Normal appearance. She is well-developed. HENT:      Head: Normocephalic and atraumatic. Right Ear: Hearing, tympanic membrane and external ear normal. No tenderness. No middle ear effusion. Left Ear: Hearing, tympanic membrane and external ear normal. No tenderness. No middle ear effusion. Nose: Nose normal. No congestion or rhinorrhea. Right Turbinates: Not enlarged. Left Turbinates: Not enlarged. Mouth/Throat:      Mouth: Mucous membranes are moist.      Tongue: No lesions. Pharynx: Oropharynx is clear. No oropharyngeal exudate or posterior oropharyngeal erythema. Eyes:      General: No scleral icterus. Conjunctiva/sclera: Conjunctivae normal.      Pupils: Pupils are equal, round, and reactive to light. Neck:      Thyroid: No thyromegaly. Cardiovascular:      Rate and Rhythm: Normal rate and regular rhythm. Heart sounds: Normal heart sounds. No murmur heard. Pulmonary:      Effort: Pulmonary effort is normal. No respiratory distress. Breath sounds: Normal breath sounds. No wheezing or rales. Abdominal:      General: Bowel sounds are normal. There is no distension. Palpations: Abdomen is soft. Tenderness: There is no abdominal tenderness. Musculoskeletal:      Cervical back: Normal range of motion and neck supple. No rigidity. No muscular tenderness. Right ankle: Tenderness present over the lateral malleolus and medial malleolus. Decreased range of motion. Right foot: Decreased range of motion. Tenderness and bony tenderness present. Lymphadenopathy:      Cervical: No cervical adenopathy.    Skin:     General: Skin is warm and dry. Findings: No erythema or rash. Neurological:      General: No focal deficit present. Mental Status: She is alert and oriented to person, place, and time. Cranial Nerves: No cranial nerve deficit. Deep Tendon Reflexes: Reflexes are normal and symmetric. Reflexes normal.   Psychiatric:         Mood and Affect: Mood normal.                                 ASSESSMENT/PLAN:    Patient Active Problem List   Diagnosis    Gastroesophageal reflux disease without esophagitis    Hiatal hernia    Carpal tunnel syndrome of right wrist    Type 2 diabetes mellitus without complication, without long-term current use of insulin [facility]    Acquired hypothyroidism    Mixed hyperlipidemia    Retinal artery occlusion       Abigail was seen today for foot pain. Diagnoses and all orders for this visit:    Sprain of right foot, initial encounter  -     XR FOOT RIGHT (MIN 3 VIEWS); Future    Sprain of right ankle, unspecified ligament, initial encounter  -     XR ANKLE RIGHT (MIN 3 VIEWS); Future          Return if symptoms worsen or fail to improve. I spent 20 minutes with this patient. I spent greater than 50% of the time counseling this patient.         Yasmin Perez DO  7/5/2022  11:13 AM

## 2022-07-18 DIAGNOSIS — E11.9 TYPE 2 DIABETES MELLITUS WITHOUT COMPLICATION, WITHOUT LONG-TERM CURRENT USE OF INSULIN (HCC): ICD-10-CM

## 2022-07-18 RX ORDER — BLOOD SUGAR DIAGNOSTIC
1 STRIP MISCELLANEOUS DAILY
Qty: 100 EACH | Refills: 3 | Status: SHIPPED
Start: 2022-07-18 | End: 2022-08-22 | Stop reason: SDUPTHER

## 2022-07-18 RX ORDER — FLUTICASONE PROPIONATE 50 MCG
2 SPRAY, SUSPENSION (ML) NASAL DAILY
Qty: 1 EACH | Refills: 1 | Status: SHIPPED
Start: 2022-07-18 | End: 2022-08-22 | Stop reason: SDUPTHER

## 2022-07-18 RX ORDER — LEVOTHYROXINE SODIUM 0.07 MG/1
75 TABLET ORAL DAILY
Qty: 90 TABLET | Refills: 1 | Status: SHIPPED
Start: 2022-07-18 | End: 2022-08-22 | Stop reason: SDUPTHER

## 2022-07-18 RX ORDER — GLIMEPIRIDE 4 MG/1
4 TABLET ORAL
Qty: 30 TABLET | Refills: 3 | Status: SHIPPED
Start: 2022-07-18 | End: 2022-08-22 | Stop reason: SDUPTHER

## 2022-07-18 RX ORDER — DULAGLUTIDE 1.5 MG/.5ML
1.5 INJECTION, SOLUTION SUBCUTANEOUS WEEKLY
Qty: 4 PEN | Refills: 2 | Status: SHIPPED
Start: 2022-07-18 | End: 2022-08-22 | Stop reason: SDUPTHER

## 2022-08-12 LAB — DIABETIC RETINOPATHY: NEGATIVE

## 2022-08-22 DIAGNOSIS — E11.9 TYPE 2 DIABETES MELLITUS WITHOUT COMPLICATION, WITHOUT LONG-TERM CURRENT USE OF INSULIN (HCC): ICD-10-CM

## 2022-08-22 RX ORDER — BLOOD SUGAR DIAGNOSTIC
1 STRIP MISCELLANEOUS DAILY
Qty: 100 EACH | Refills: 3 | Status: SHIPPED
Start: 2022-08-22 | End: 2022-09-19 | Stop reason: SDUPTHER

## 2022-08-22 RX ORDER — GLIMEPIRIDE 4 MG/1
4 TABLET ORAL
Qty: 30 TABLET | Refills: 3 | Status: SHIPPED
Start: 2022-08-22 | End: 2022-09-19 | Stop reason: SDUPTHER

## 2022-08-22 RX ORDER — LEVOTHYROXINE SODIUM 0.07 MG/1
75 TABLET ORAL DAILY
Qty: 90 TABLET | Refills: 1 | Status: SHIPPED
Start: 2022-08-22 | End: 2022-09-19 | Stop reason: SDUPTHER

## 2022-08-22 RX ORDER — DULAGLUTIDE 1.5 MG/.5ML
1.5 INJECTION, SOLUTION SUBCUTANEOUS WEEKLY
Qty: 4 PEN | Refills: 2 | Status: SHIPPED
Start: 2022-08-22 | End: 2022-09-19 | Stop reason: SDUPTHER

## 2022-08-22 RX ORDER — FLUTICASONE PROPIONATE 50 MCG
2 SPRAY, SUSPENSION (ML) NASAL DAILY
Qty: 1 EACH | Refills: 1 | Status: SHIPPED
Start: 2022-08-22 | End: 2022-09-19 | Stop reason: SDUPTHER

## 2022-08-22 RX ORDER — BLOOD-GLUCOSE METER
1 EACH MISCELLANEOUS DAILY
Qty: 1 KIT | Refills: 0 | Status: SHIPPED | OUTPATIENT
Start: 2022-08-22

## 2022-09-16 ENCOUNTER — HOSPITAL ENCOUNTER (OUTPATIENT)
Dept: CT IMAGING | Age: 42
Discharge: HOME | End: 2022-09-18
Payer: COMMERCIAL

## 2022-09-16 ENCOUNTER — OFFICE VISIT (OUTPATIENT)
Dept: FAMILY MEDICINE CLINIC | Age: 42
End: 2022-09-16
Payer: COMMERCIAL

## 2022-09-16 VITALS
OXYGEN SATURATION: 98 % | DIASTOLIC BLOOD PRESSURE: 74 MMHG | RESPIRATION RATE: 16 BRPM | HEART RATE: 91 BPM | HEIGHT: 61 IN | SYSTOLIC BLOOD PRESSURE: 118 MMHG | WEIGHT: 154 LBS | TEMPERATURE: 97.3 F | BODY MASS INDEX: 29.07 KG/M2

## 2022-09-16 DIAGNOSIS — R10.9 ACUTE ABDOMINAL PAIN: ICD-10-CM

## 2022-09-16 DIAGNOSIS — R50.9 FEVER, UNSPECIFIED FEVER CAUSE: ICD-10-CM

## 2022-09-16 DIAGNOSIS — K59.00 CONSTIPATION, UNSPECIFIED CONSTIPATION TYPE: ICD-10-CM

## 2022-09-16 DIAGNOSIS — R10.9 ACUTE ABDOMINAL PAIN: Primary | ICD-10-CM

## 2022-09-16 DIAGNOSIS — B96.89 ACUTE BACTERIAL SINUSITIS: ICD-10-CM

## 2022-09-16 DIAGNOSIS — J01.90 ACUTE BACTERIAL SINUSITIS: ICD-10-CM

## 2022-09-16 LAB
ALBUMIN SERPL-MCNC: 4.2 G/DL (ref 3.5–5.2)
ALP BLD-CCNC: 81 U/L (ref 35–104)
ALT SERPL-CCNC: 31 U/L (ref 0–32)
ANION GAP SERPL CALCULATED.3IONS-SCNC: 12 MMOL/L (ref 7–16)
AST SERPL-CCNC: 30 U/L (ref 0–31)
BACTERIA: ABNORMAL /HPF
BASOPHILS ABSOLUTE: 0.09 E9/L (ref 0–0.2)
BASOPHILS RELATIVE PERCENT: 0.9 % (ref 0–2)
BILIRUB SERPL-MCNC: 0.4 MG/DL (ref 0–1.2)
BILIRUBIN URINE: NEGATIVE
BLOOD, URINE: ABNORMAL
BUN BLDV-MCNC: 8 MG/DL (ref 6–20)
CALCIUM SERPL-MCNC: 9.3 MG/DL (ref 8.6–10.2)
CHLORIDE BLD-SCNC: 99 MMOL/L (ref 98–107)
CLARITY: CLEAR
CO2: 24 MMOL/L (ref 22–29)
COLOR: YELLOW
CREAT SERPL-MCNC: 0.7 MG/DL (ref 0.5–1)
EOSINOPHILS ABSOLUTE: 0.09 E9/L (ref 0.05–0.5)
EOSINOPHILS RELATIVE PERCENT: 0.9 % (ref 0–6)
GFR AFRICAN AMERICAN: >60
GFR NON-AFRICAN AMERICAN: >60 ML/MIN/1.73
GLUCOSE BLD-MCNC: 161 MG/DL (ref 74–99)
GLUCOSE URINE: 250 MG/DL
HCT VFR BLD CALC: 41.4 % (ref 34–48)
HEMOGLOBIN: 13.5 G/DL (ref 11.5–15.5)
IMMATURE GRANULOCYTES #: 0.03 E9/L
IMMATURE GRANULOCYTES %: 0.3 % (ref 0–5)
KETONES, URINE: NEGATIVE MG/DL
LEUKOCYTE ESTERASE, URINE: ABNORMAL
LIPASE: 37 U/L (ref 13–60)
LYMPHOCYTES ABSOLUTE: 2.19 E9/L (ref 1.5–4)
LYMPHOCYTES RELATIVE PERCENT: 22.1 % (ref 20–42)
MCH RBC QN AUTO: 29.3 PG (ref 26–35)
MCHC RBC AUTO-ENTMCNC: 32.6 % (ref 32–34.5)
MCV RBC AUTO: 90 FL (ref 80–99.9)
MONOCYTES ABSOLUTE: 1.03 E9/L (ref 0.1–0.95)
MONOCYTES RELATIVE PERCENT: 10.4 % (ref 2–12)
NEUTROPHILS ABSOLUTE: 6.49 E9/L (ref 1.8–7.3)
NEUTROPHILS RELATIVE PERCENT: 65.4 % (ref 43–80)
NITRITE, URINE: NEGATIVE
PDW BLD-RTO: 12.7 FL (ref 11.5–15)
PH UA: 6 (ref 5–9)
PLATELET # BLD: 182 E9/L (ref 130–450)
PMV BLD AUTO: 12.1 FL (ref 7–12)
POTASSIUM SERPL-SCNC: 3.8 MMOL/L (ref 3.5–5)
PROTEIN UA: NEGATIVE MG/DL
RBC # BLD: 4.6 E12/L (ref 3.5–5.5)
RBC UA: ABNORMAL /HPF (ref 0–2)
SODIUM BLD-SCNC: 135 MMOL/L (ref 132–146)
SPECIFIC GRAVITY UA: 1.02 (ref 1–1.03)
TOTAL PROTEIN: 8.1 G/DL (ref 6.4–8.3)
UROBILINOGEN, URINE: 1 E.U./DL
WBC # BLD: 9.9 E9/L (ref 4.5–11.5)
WBC UA: ABNORMAL /HPF (ref 0–5)

## 2022-09-16 PROCEDURE — G8427 DOCREV CUR MEDS BY ELIG CLIN: HCPCS | Performed by: FAMILY MEDICINE

## 2022-09-16 PROCEDURE — 1036F TOBACCO NON-USER: CPT | Performed by: FAMILY MEDICINE

## 2022-09-16 PROCEDURE — 99214 OFFICE O/P EST MOD 30 MIN: CPT | Performed by: FAMILY MEDICINE

## 2022-09-16 PROCEDURE — G8417 CALC BMI ABV UP PARAM F/U: HCPCS | Performed by: FAMILY MEDICINE

## 2022-09-16 PROCEDURE — 74176 CT ABD & PELVIS W/O CONTRAST: CPT

## 2022-09-16 RX ORDER — CEFDINIR 300 MG/1
300 CAPSULE ORAL 2 TIMES DAILY
Qty: 20 CAPSULE | Refills: 0 | Status: SHIPPED | OUTPATIENT
Start: 2022-09-16 | End: 2022-09-26

## 2022-09-16 ASSESSMENT — ENCOUNTER SYMPTOMS
APNEA: 0
EYE ITCHING: 0
CHANGE IN BOWEL HABIT: 1
VOMITING: 0
COUGH: 0
RHINORRHEA: 0
CHEST TIGHTNESS: 0
EYE PAIN: 0
EYE REDNESS: 0
BLOOD IN STOOL: 0
ABDOMINAL PAIN: 1
DIARRHEA: 0
COLOR CHANGE: 0
CONSTIPATION: 1
SINUS PRESSURE: 0
SHORTNESS OF BREATH: 0
SORE THROAT: 1
NAUSEA: 0
BACK PAIN: 0
WHEEZING: 0

## 2022-09-16 NOTE — PROGRESS NOTES
Chief Complaint:     Chief Complaint   Patient presents with    Fever     102 yesterday    Generalized Body Aches    Abdominal Pain    Constipation         Fever   This is a new problem. The current episode started in the past 7 days. The problem occurs intermittently. The problem has been waxing and waning. Associated symptoms include abdominal pain, congestion and a sore throat. Pertinent negatives include no chest pain, coughing, diarrhea, ear pain, headaches, nausea, rash, urinary pain, vomiting or wheezing. She has tried nothing for the symptoms. The treatment provided no relief. Generalized Body Aches  This is a new problem. The current episode started in the past 7 days. The problem has been waxing and waning. Associated symptoms include abdominal pain, arthralgias, a change in bowel habit, congestion, a fever, myalgias and a sore throat. Pertinent negatives include no chest pain, coughing, fatigue, headaches, nausea, neck pain, numbness, rash, vomiting or weakness. Nothing aggravates the symptoms. She has tried nothing for the symptoms. The treatment provided no relief. Abdominal Pain  This is a new problem. The current episode started 1 to 4 weeks ago. The problem occurs intermittently. The problem has been waxing and waning. The pain is located in the generalized abdominal region. The pain is moderate. The quality of the pain is dull and aching. Associated symptoms include arthralgias, constipation, a fever and myalgias. Pertinent negatives include no diarrhea, dysuria, frequency, headaches, hematuria, nausea or vomiting. Nothing aggravates the pain. The pain is relieved by Nothing.      Patient Active Problem List   Diagnosis    Gastroesophageal reflux disease without esophagitis    Hiatal hernia    Carpal tunnel syndrome of right wrist    Type 2 diabetes mellitus without complication, without long-term current use of insulin (HCC)    Acquired hypothyroidism    Mixed hyperlipidemia    Retinal artery occlusion       Past Medical History:   Diagnosis Date    Carpal tunnel syndrome, right     Diabetes mellitus ([address]     GERD (gastroesophageal reflux disease)     Hyperlipidemia     Hypothyroidism        Past Surgical History:   Procedure Laterality Date    CARPAL TUNNEL RELEASE Right 3/18/2020    RIGHT CARPAL TUNNEL RELEASE performed by Graciela Baker MD at [address] [place] [address] CATH, 5/> YRS  7/4/2020            Current Outpatient Medications   Medication Sig Dispense Refill    cefdinir (OMNICEF) 300 MG capsule Take 1 capsule by mouth 2 times daily for 10 days 20 capsule 0    Blood Glucose Monitoring Suppl (ONE TOUCH ULTRA 2) w/Device KIT 1 kit by Does not apply route daily 1 kit 0    levothyroxine (SYNTHROID) 75 MCG tablet Take 1 tablet by mouth daily 90 tablet 1    blood glucose test strips (ONETOUCH ULTRA) strip 1 each by In Vitro route daily As needed. 100 each 3    fluticasone (FLONASE) 50 MCG/ACT nasal spray 2 sprays by Each Nostril route daily 1 each 1    glimepiride (AMARYL) 4 MG tablet Take 1 tablet by mouth every morning (before breakfast) 30 tablet 3    Dulaglutide (TRULICITY) 1.5 KH/3.3FH SOPN Inject 1.5 mg into the skin once a week 4 pen 2    [employer] FINEPOINT LANCETS MISC Test TID with meals 100 each 1    atorvastatin (LIPITOR) 20 MG tablet Take 1 tablet by mouth daily 30 tablet 5    ibuprofen (ADVIL;MOTRIN) 600 MG tablet Take 1 tablet by mouth 3 times daily as needed for Pain 90 tablet 0     No current facility-administered medications for this visit.        Allergies   Allergen Reactions    Iodine Anaphylaxis    Shellfish-Derived Products Anaphylaxis, Hives, Itching and Rash       Social History     Socioeconomic History    Marital status:      Spouse name: None    Number of children: None    Years of education: None    Highest education level: None   Tobacco Use    Smoking status: Never    Smokeless tobacco: Never   Vaping Use    Vaping Use: Never used   Substance and Sexual Activity Alcohol use: Never    Drug use: Never       Family History   Problem Relation Age of Onset    Other Mother         aneurysm    Stroke Father     Heart Attack Father     No Known Problems Sister     Diabetes Brother     Heart Attack Brother 48    No Known Problems Sister           Review of Systems   Constitutional:  Positive for fever. Negative for activity change, appetite change and fatigue. HENT:  Positive for congestion and sore throat. Negative for ear pain, hearing loss, nosebleeds, rhinorrhea and sinus pressure. Eyes:  Negative for pain, redness, itching and visual disturbance. Respiratory:  Negative for apnea, cough, chest tightness, shortness of breath and wheezing. Cardiovascular:  Negative for chest pain, palpitations and leg swelling. Gastrointestinal:  Positive for abdominal pain, change in bowel habit and constipation. Negative for blood in stool, diarrhea, nausea and vomiting. Endocrine: Negative. Genitourinary:  Negative for decreased urine volume, difficulty urinating, dysuria, frequency, hematuria and urgency. Musculoskeletal:  Positive for arthralgias and myalgias. Negative for back pain, gait problem and neck pain. Skin:  Negative for color change and rash. Allergic/Immunologic: Negative for environmental allergies and food allergies. Neurological:  Negative for dizziness, weakness, light-headedness, numbness and headaches. Hematological:  Negative for adenopathy. Does not bruise/bleed easily. Psychiatric/Behavioral:  Negative for behavioral problems, dysphoric mood and sleep disturbance. The patient is not nervous/anxious and is not hyperactive. All other systems reviewed and are negative. /74   Pulse 91   Temp 97.3 °F (36.3 °C)   Resp 16   Ht 5' 1\" (1.549 m)   Wt 154 lb (69.9 kg)   SpO2 98%   BMI 29.10 kg/m²     Physical Exam  Vitals and nursing note reviewed. Constitutional:       General: She is not in acute distress.      Appearance: Normal appearance. She is well-developed. HENT:      Head: Normocephalic and atraumatic. Right Ear: Hearing, tympanic membrane and external ear normal. No tenderness. No middle ear effusion. Left Ear: Hearing, tympanic membrane and external ear normal. No tenderness. No middle ear effusion. Nose: Nose normal. No congestion or rhinorrhea. Right Turbinates: Not enlarged. Left Turbinates: Not enlarged. Mouth/Throat:      Mouth: Mucous membranes are moist.      Tongue: No lesions. Pharynx: Oropharynx is clear. No oropharyngeal exudate or posterior oropharyngeal erythema. Eyes:      General: No scleral icterus. Conjunctiva/sclera: Conjunctivae normal.      Pupils: Pupils are equal, round, and reactive to light. Neck:      Thyroid: No thyromegaly. Cardiovascular:      Rate and Rhythm: Normal rate and regular rhythm. Heart sounds: Normal heart sounds. No murmur heard. Pulmonary:      Effort: Pulmonary effort is normal. No respiratory distress. Breath sounds: Normal breath sounds. No wheezing or rales. Abdominal:      General: Bowel sounds are normal. There is no distension. Palpations: Abdomen is soft. Tenderness: There is no abdominal tenderness. Musculoskeletal:         General: No tenderness. Normal range of motion. Cervical back: Normal range of motion and neck supple. No rigidity. No muscular tenderness. Lymphadenopathy:      Cervical: No cervical adenopathy. Skin:     General: Skin is warm and dry. Findings: No erythema or rash. Neurological:      General: No focal deficit present. Mental Status: She is alert and oriented to person, place, and time. Cranial Nerves: No cranial nerve deficit. Deep Tendon Reflexes: Reflexes are normal and symmetric.  Reflexes normal.   Psychiatric:         Mood and Affect: Mood normal.                               ASSESSMENT/PLAN:    Patient Active Problem List   Diagnosis Gastroesophageal reflux disease without esophagitis    Hiatal hernia    Carpal tunnel syndrome of right wrist    Type 2 diabetes mellitus without complication, without long-term current use of insulin (HCC)    Acquired hypothyroidism    Mixed hyperlipidemia    Retinal artery occlusion       Abigail was seen today for fever, generalized body aches, abdominal pain and constipation. Diagnoses and all orders for this visit:    Acute abdominal pain  -     CT ABDOMEN PELVIS WO CONTRAST; Future  -     CBC with Auto Differential; Future  -     Comprehensive Metabolic Panel; Future  -     Lipase; Future  -     Urinalysis; Future  -     Culture, Urine; Future    Constipation, unspecified constipation type    Fever, unspecified fever cause  -     CT ABDOMEN PELVIS WO CONTRAST; Future  -     CBC with Auto Differential; Future  -     Comprehensive Metabolic Panel; Future  -     Lipase; Future  -     Urinalysis; Future  -     Culture, Urine; Future    Acute bacterial sinusitis    Other orders  -     cefdinir (OMNICEF) 300 MG capsule; Take 1 capsule by mouth 2 times daily for 10 days        Return if symptoms worsen or fail to improve. I spent 30 minutes with this patient. I spent greater than 50% of the time counseling this patient.         Henna Arias DO  9/16/2022  12:01 PM

## 2022-09-18 LAB — URINE CULTURE, ROUTINE: NORMAL

## 2022-09-19 DIAGNOSIS — E11.9 TYPE 2 DIABETES MELLITUS WITHOUT COMPLICATION, WITHOUT LONG-TERM CURRENT USE OF INSULIN (HCC): ICD-10-CM

## 2022-09-19 RX ORDER — DULAGLUTIDE 1.5 MG/.5ML
1.5 INJECTION, SOLUTION SUBCUTANEOUS WEEKLY
Qty: 4 ADJUSTABLE DOSE PRE-FILLED PEN SYRINGE | Refills: 5 | Status: SHIPPED
Start: 2022-09-19 | End: 2022-10-22 | Stop reason: SDUPTHER

## 2022-09-19 RX ORDER — LEVOTHYROXINE SODIUM 0.07 MG/1
75 TABLET ORAL DAILY
Qty: 90 TABLET | Refills: 1 | Status: SHIPPED
Start: 2022-09-19 | End: 2022-10-22 | Stop reason: SDUPTHER

## 2022-09-19 RX ORDER — GLIMEPIRIDE 4 MG/1
4 TABLET ORAL
Qty: 30 TABLET | Refills: 3 | Status: SHIPPED
Start: 2022-09-19 | End: 2022-10-22 | Stop reason: SDUPTHER

## 2022-09-19 RX ORDER — ATORVASTATIN CALCIUM 20 MG/1
20 TABLET, FILM COATED ORAL DAILY
Qty: 30 TABLET | Refills: 5 | Status: SHIPPED | OUTPATIENT
Start: 2022-09-19

## 2022-09-19 RX ORDER — BLOOD SUGAR DIAGNOSTIC
1 STRIP MISCELLANEOUS DAILY
Qty: 100 EACH | Refills: 3 | Status: SHIPPED
Start: 2022-09-19 | End: 2022-10-22 | Stop reason: SDUPTHER

## 2022-09-19 RX ORDER — BLOOD-GLUCOSE METER
EACH MISCELLANEOUS
Qty: 100 EACH | Refills: 1 | Status: SHIPPED | OUTPATIENT
Start: 2022-09-19

## 2022-09-19 RX ORDER — FLUTICASONE PROPIONATE 50 MCG
2 SPRAY, SUSPENSION (ML) NASAL DAILY
Qty: 1 EACH | Refills: 1 | Status: SHIPPED
Start: 2022-09-19 | End: 2022-10-22 | Stop reason: SDUPTHER

## 2022-10-10 ENCOUNTER — OFFICE VISIT (OUTPATIENT)
Dept: PRIMARY CARE CLINIC | Age: 42
End: 2022-10-10
Payer: COMMERCIAL

## 2022-10-10 VITALS
OXYGEN SATURATION: 100 % | WEIGHT: 152 LBS | DIASTOLIC BLOOD PRESSURE: 68 MMHG | BODY MASS INDEX: 28.7 KG/M2 | HEIGHT: 61 IN | RESPIRATION RATE: 16 BRPM | SYSTOLIC BLOOD PRESSURE: 116 MMHG | HEART RATE: 83 BPM

## 2022-10-10 DIAGNOSIS — E03.9 ACQUIRED HYPOTHYROIDISM: ICD-10-CM

## 2022-10-10 DIAGNOSIS — S93.601D SPRAIN OF RIGHT FOOT, SUBSEQUENT ENCOUNTER: Primary | ICD-10-CM

## 2022-10-10 DIAGNOSIS — E78.2 MIXED HYPERLIPIDEMIA: ICD-10-CM

## 2022-10-10 DIAGNOSIS — M72.2 PLANTAR FASCIITIS OF RIGHT FOOT: ICD-10-CM

## 2022-10-10 DIAGNOSIS — E11.9 TYPE 2 DIABETES MELLITUS WITHOUT COMPLICATION, WITHOUT LONG-TERM CURRENT USE OF INSULIN (HCC): ICD-10-CM

## 2022-10-10 PROCEDURE — 3052F HG A1C>EQUAL 8.0%<EQUAL 9.0%: CPT | Performed by: FAMILY MEDICINE

## 2022-10-10 PROCEDURE — 2022F DILAT RTA XM EVC RTNOPTHY: CPT | Performed by: FAMILY MEDICINE

## 2022-10-10 PROCEDURE — G8484 FLU IMMUNIZE NO ADMIN: HCPCS | Performed by: FAMILY MEDICINE

## 2022-10-10 PROCEDURE — 99214 OFFICE O/P EST MOD 30 MIN: CPT | Performed by: FAMILY MEDICINE

## 2022-10-10 PROCEDURE — 1036F TOBACCO NON-USER: CPT | Performed by: FAMILY MEDICINE

## 2022-10-10 PROCEDURE — G8427 DOCREV CUR MEDS BY ELIG CLIN: HCPCS | Performed by: FAMILY MEDICINE

## 2022-10-10 PROCEDURE — G8417 CALC BMI ABV UP PARAM F/U: HCPCS | Performed by: FAMILY MEDICINE

## 2022-10-10 ASSESSMENT — ENCOUNTER SYMPTOMS
DIARRHEA: 0
SORE THROAT: 0
RHINORRHEA: 0
APNEA: 0
BACK PAIN: 0
BLOOD IN STOOL: 0
COLOR CHANGE: 0
CONSTIPATION: 0
VOMITING: 0
EYE REDNESS: 0
CHEST TIGHTNESS: 0
EYE PAIN: 0
EYE ITCHING: 0
ABDOMINAL PAIN: 0
WHEEZING: 0
SINUS PRESSURE: 0
NAUSEA: 0
SHORTNESS OF BREATH: 0
COUGH: 0

## 2022-10-10 ASSESSMENT — PATIENT HEALTH QUESTIONNAIRE - PHQ9
1. LITTLE INTEREST OR PLEASURE IN DOING THINGS: 0
SUM OF ALL RESPONSES TO PHQ QUESTIONS 1-9: 0
SUM OF ALL RESPONSES TO PHQ9 QUESTIONS 1 & 2: 0
2. FEELING DOWN, DEPRESSED OR HOPELESS: 0
SUM OF ALL RESPONSES TO PHQ QUESTIONS 1-9: 0

## 2022-10-10 NOTE — PROGRESS NOTES
Chief Complaint:     Chief Complaint   Patient presents with    Foot Pain     Right foot, said last week because of limping it went into her back          Foot Pain   The pain is present in the right foot. This is a recurrent problem. The current episode started 1 to 4 weeks ago. The problem occurs daily. The problem has been unchanged. The quality of the pain is described as aching. The pain is moderate. Associated symptoms include an inability to bear weight, a limited range of motion and stiffness. Pertinent negatives include no fever or numbness. The symptoms are aggravated by activity and standing. She has tried acetaminophen and NSAIDS for the symptoms. The treatment provided no relief. Her past medical history is significant for diabetes. Diabetes  She presents for her follow-up diabetic visit. She has type 2 diabetes mellitus. Her disease course has been stable. Pertinent negatives for hypoglycemia include no dizziness, headaches or nervousness/anxiousness. Pertinent negatives for diabetes include no chest pain, no fatigue, no weakness and no weight loss. There are no hypoglycemic complications. Symptoms are stable. There are no diabetic complications. Risk factors for coronary artery disease include diabetes mellitus and dyslipidemia. Current diabetic treatment includes oral agent (monotherapy). She is compliant with treatment all of the time. Her weight is stable. When asked about meal planning, she reported none. There is no change in her home blood glucose trend. An ACE inhibitor/angiotensin II receptor blocker is not being taken. She does not see a podiatrist.Eye exam is not current. Hyperlipidemia  This is a chronic problem. The current episode started more than 1 month ago. The problem is controlled. Exacerbating diseases include diabetes and hypothyroidism. Pertinent negatives include no chest pain, myalgias or shortness of breath. Current antihyperlipidemic treatment includes statins.  The current treatment provides significant improvement of lipids. There are no compliance problems. Risk factors for coronary artery disease include diabetes mellitus and dyslipidemia. Other  This is a recurrent (thyroid disease) problem. The current episode started more than 1 month ago. The problem occurs intermittently. The problem has been waxing and waning. Pertinent negatives include no abdominal pain, arthralgias, chest pain, congestion, coughing, fatigue, fever, headaches, myalgias, nausea, neck pain, numbness, rash, sore throat, vomiting or weakness. Nothing aggravates the symptoms. She has tried nothing for the symptoms. The treatment provided no relief. Patient Active Problem List   Diagnosis    Gastroesophageal reflux disease without esophagitis    Hiatal hernia    Carpal tunnel syndrome of right wrist    Type 2 diabetes mellitus without complication, without long-term current use of insulin ([facility])    Acquired hypothyroidism    Mixed hyperlipidemia    Retinal artery occlusion       Past Medical History:   Diagnosis Date    Carpal tunnel syndrome, right     Diabetes mellitus ([facility] [address]     GERD (gastroesophageal reflux disease)     Hyperlipidemia     Hypothyroidism        Past Surgical History:   Procedure Laterality Date    CARPAL TUNNEL RELEASE Right 3/18/2020    RIGHT CARPAL TUNNEL RELEASE performed by Bhargavi Lay MD at [facility], 5/> YRS  7/4/2020            Current Outpatient Medications   Medication Sig Dispense Refill    LIFESCAN FINEPOINT LANCETS MISC Test TID with meals 100 each 1    levothyroxine (SYNTHROID) 75 MCG tablet Take 1 tablet by mouth daily 90 tablet 1    blood glucose test strips (ONETOUCH ULTRA) strip 1 each by In Vitro route daily As needed.  100 each 3    fluticasone (FLONASE) 50 MCG/ACT nasal spray 2 sprays by Each Nostril route daily 1 each 1    glimepiride (AMARYL) 4 MG tablet Take 1 tablet by mouth every morning (before breakfast) 30 tablet 3 Dulaglutide (TRULICITY) 1.5 LK/1.9PU SOPN Inject 1.5 mg into the skin once a week 4 Adjustable Dose Pre-filled Pen Syringe 5    atorvastatin (LIPITOR) 20 MG tablet Take 1 tablet by mouth daily 30 tablet 5    Blood Glucose Monitoring Suppl (ONE TOUCH ULTRA 2) w/Device KIT 1 kit by Does not apply route daily 1 kit 0    ibuprofen (ADVIL;MOTRIN) 600 MG tablet Take 1 tablet by mouth 3 times daily as needed for Pain 90 tablet 0     No current facility-administered medications for this visit. Allergies   Allergen Reactions    Iodine Anaphylaxis    Shellfish-Derived Products Anaphylaxis, Hives, Itching and Rash       Social History     Socioeconomic History    Marital status:      Spouse name: None    Number of children: None    Years of education: None    Highest education level: None   Tobacco Use    Smoking status: Never    Smokeless tobacco: Never   Vaping Use    Vaping Use: Never used   Substance and Sexual Activity    Alcohol use: Never    Drug use: Never       Family History   Problem Relation Age of Onset    Other Mother         aneurysm    Stroke Father     Heart Attack Father     No Known Problems Sister     Diabetes Brother     Heart Attack Brother 48    No Known Problems Sister           Review of Systems   Constitutional:  Negative for activity change, appetite change, fatigue, fever and weight loss. HENT:  Negative for congestion, ear pain, hearing loss, nosebleeds, rhinorrhea, sinus pressure and sore throat. Eyes:  Negative for pain, redness, itching and visual disturbance. Respiratory:  Negative for apnea, cough, chest tightness, shortness of breath and wheezing. Cardiovascular:  Negative for chest pain, palpitations and leg swelling. Gastrointestinal:  Negative for abdominal pain, blood in stool, constipation, diarrhea, nausea and vomiting. Endocrine: Negative. Genitourinary:  Negative for decreased urine volume, difficulty urinating, dysuria, frequency, hematuria and urgency. Musculoskeletal:  Positive for stiffness. Negative for arthralgias, back pain, gait problem, myalgias and neck pain. Skin:  Negative for color change and rash. Allergic/Immunologic: Negative for environmental allergies and food allergies. Neurological:  Negative for dizziness, weakness, light-headedness, numbness and headaches. Hematological:  Negative for adenopathy. Does not bruise/bleed easily. Psychiatric/Behavioral:  Negative for behavioral problems, dysphoric mood and sleep disturbance. The patient is not nervous/anxious and is not hyperactive. All other systems reviewed and are negative. /68   Pulse 83   Resp 16   Ht 5' 1\" (1.549 m)   Wt 152 lb (68.9 kg)   SpO2 100%   BMI 28.72 kg/m²     Physical Exam  Vitals and nursing note reviewed. Constitutional:       General: She is not in acute distress. Appearance: Normal appearance. She is well-developed. HENT:      Head: Normocephalic and atraumatic. Right Ear: Hearing, tympanic membrane and external ear normal. No tenderness. No middle ear effusion. Left Ear: Hearing, tympanic membrane and external ear normal. No tenderness. No middle ear effusion. Nose: Nose normal. No congestion or rhinorrhea. Right Turbinates: Not enlarged. Left Turbinates: Not enlarged. Mouth/Throat:      Mouth: Mucous membranes are moist.      Tongue: No lesions. Pharynx: Oropharynx is clear. No oropharyngeal exudate or posterior oropharyngeal erythema. Eyes:      General: No scleral icterus. Conjunctiva/sclera: Conjunctivae normal.      Pupils: Pupils are equal, round, and reactive to light. Neck:      Thyroid: No thyromegaly. Cardiovascular:      Rate and Rhythm: Normal rate and regular rhythm. Heart sounds: Normal heart sounds. No murmur heard. Pulmonary:      Effort: Pulmonary effort is normal. No respiratory distress. Breath sounds: Normal breath sounds. No wheezing or rales.    Abdominal: General: Bowel sounds are normal. There is no distension. Palpations: Abdomen is soft. Tenderness: There is no abdominal tenderness. Musculoskeletal:      Cervical back: Normal range of motion and neck supple. No rigidity. No muscular tenderness. Right foot: Decreased range of motion. Tenderness and bony tenderness present. Lymphadenopathy:      Cervical: No cervical adenopathy. Skin:     General: Skin is warm and dry. Findings: No erythema or rash. Neurological:      General: No focal deficit present. Mental Status: She is alert and oriented to person, place, and time. Cranial Nerves: No cranial nerve deficit. Deep Tendon Reflexes: Reflexes are normal and symmetric. Reflexes normal.   Psychiatric:         Mood and Affect: Mood normal.                               ASSESSMENT/PLAN:    Patient Active Problem List   Diagnosis    Gastroesophageal reflux disease without esophagitis    Hiatal hernia    Carpal tunnel syndrome of right wrist    Type 2 diabetes mellitus without complication, without long-term current use of insulin ([facility] [address]    Acquired hypothyroidism    Mixed hyperlipidemia    Retinal artery occlusion       Abigail was seen today for foot pain. Diagnoses and all orders for this visit:    Sprain of right foot, subsequent encounter  -     R Jacqueline [facility] [place], Podiatry, [address] [facility]ness    Plantar fasciitis of right foot  -     R Jacqueline , [facility], Podiatry, [address]  - Physical Therapy, [facility]Wellness    Type 2 diabetes mellitus without complication, without long-term current use of insulin ([facility] [address]  -     Comprehensive Metabolic Panel; Future  -     Hemoglobin A1C; Future  -     Lipid Panel; Future  -     Microalbumin / Creatinine Urine Ratio; Future  -     TSH; Future    Acquired hypothyroidism  -     TSH;  Future    Mixed hyperlipidemia  -     Comprehensive Metabolic Panel; Future  -     Lipid Panel; Future  -     TSH; Future        Return in about 6 months (around 4/10/2023) for Follow up DM, Follow up Lipids, Follow up HTN, Recheck labs, Recheck Meds. I spent 30 minutes with this patient. I spent greater than 50% of the time counseling this patient.         Lizzie Beach DO  10/10/2022  11:59 AM

## 2022-10-22 DIAGNOSIS — E11.9 TYPE 2 DIABETES MELLITUS WITHOUT COMPLICATION, WITHOUT LONG-TERM CURRENT USE OF INSULIN (HCC): ICD-10-CM

## 2022-10-24 RX ORDER — LEVOTHYROXINE SODIUM 0.07 MG/1
75 TABLET ORAL DAILY
Qty: 90 TABLET | Refills: 1 | Status: SHIPPED | OUTPATIENT
Start: 2022-10-24

## 2022-10-24 RX ORDER — GLIMEPIRIDE 4 MG/1
4 TABLET ORAL
Qty: 30 TABLET | Refills: 3 | Status: SHIPPED | OUTPATIENT
Start: 2022-10-24

## 2022-10-24 RX ORDER — BLOOD SUGAR DIAGNOSTIC
1 STRIP MISCELLANEOUS DAILY
Qty: 100 EACH | Refills: 3 | Status: SHIPPED | OUTPATIENT
Start: 2022-10-24

## 2022-10-24 RX ORDER — FLUTICASONE PROPIONATE 50 MCG
2 SPRAY, SUSPENSION (ML) NASAL DAILY
Qty: 1 EACH | Refills: 1 | Status: SHIPPED | OUTPATIENT
Start: 2022-10-24

## 2022-10-24 RX ORDER — DULAGLUTIDE 1.5 MG/.5ML
1.5 INJECTION, SOLUTION SUBCUTANEOUS WEEKLY
Qty: 4 ADJUSTABLE DOSE PRE-FILLED PEN SYRINGE | Refills: 5 | Status: SHIPPED | OUTPATIENT
Start: 2022-10-24

## 2022-10-31 ENCOUNTER — OFFICE VISIT (OUTPATIENT)
Dept: PODIATRY | Age: 42
End: 2022-10-31
Payer: COMMERCIAL

## 2022-10-31 VITALS — HEIGHT: 61 IN | BODY MASS INDEX: 28.7 KG/M2 | WEIGHT: 152 LBS

## 2022-10-31 DIAGNOSIS — R60.0 LOCALIZED EDEMA: ICD-10-CM

## 2022-10-31 DIAGNOSIS — R26.2 DIFFICULTY WALKING: ICD-10-CM

## 2022-10-31 DIAGNOSIS — S93.601A RIGHT FOOT SPRAIN, INITIAL ENCOUNTER: Primary | ICD-10-CM

## 2022-10-31 DIAGNOSIS — M79.671 RIGHT FOOT PAIN: ICD-10-CM

## 2022-10-31 PROCEDURE — G8427 DOCREV CUR MEDS BY ELIG CLIN: HCPCS | Performed by: PODIATRIST

## 2022-10-31 PROCEDURE — G8484 FLU IMMUNIZE NO ADMIN: HCPCS | Performed by: PODIATRIST

## 2022-10-31 PROCEDURE — G8417 CALC BMI ABV UP PARAM F/U: HCPCS | Performed by: PODIATRIST

## 2022-10-31 PROCEDURE — 99203 OFFICE O/P NEW LOW 30 MIN: CPT | Performed by: PODIATRIST

## 2022-10-31 PROCEDURE — 29580 STRAPPING UNNA BOOT: CPT | Performed by: PODIATRIST

## 2022-10-31 NOTE — LETTER
[facility] [address] [facility] [address]  Phone: [phone]  Fax: Anjel Bautista        October 31, 2022     Patient: Deedee Negron   YOB: 1980   Date of Visit: 10/31/2022       To Whom It May Concern: It is my medical opinion that Deedee Negron needs to be sitting in between rounds for the next 4 days. If you have any questions or concerns, please don't hesitate to call.     Sincerely,        Garret Barron DPM

## 2022-10-31 NOTE — LETTER
[address] [facility] [address] [facility]    Phone: [phone]  Fax: Jenna Bautista  [phone]   1980  10/31/2022      Dear Vignesh Kang,    I would like to thank you for the kind referral of [facility]. She presented to the office today for evaluation regarding chronic pain and swelling into her right lower extremity from recent injury 4 to 5 weeks ago. Radiographs were reviewed with patient today, no acute osseous abnormalities noted. Compression dressing applied symptomatic right lower extremity, and patient was placed in a immobilization boot to reduce current symptoms. We will have continued follow-up until issues are improved. We will potentially proceed with further imaging studies if symptoms persist.  If you should have any questions concerning her visit today, please do not hesitate to contact me.     Sincerely,    Yvonne Rogers DPM

## 2022-10-31 NOTE — PROGRESS NOTES
Patient is here for right foot pain. Patient states pain for about 3 months. Patient states she was at [facility] and was running. Patient states she stands for long periods at work.   Darnell Merino DO  Electronically signed by Rolando Bright LPN on 52/72/3062 at 9:19 AM

## 2022-10-31 NOTE — PROGRESS NOTES
w/Device KIT, 1 kit by Does not apply route daily, Disp: 1 kit, Rfl: 0    ibuprofen (ADVIL;MOTRIN) 600 MG tablet, Take 1 tablet by mouth 3 times daily as needed for Pain, Disp: 90 tablet, Rfl: 0    Allergies: Allergies   Allergen Reactions    Iodine Anaphylaxis    Shellfish-Derived Products Anaphylaxis, Hives, Itching and Rash       Vitals:    10/31/22 0918   Weight: 152 lb (68.9 kg)   Height: 5' 1\" (1.549 m)        Past Medical History:   Diagnosis Date    Carpal tunnel syndrome, right     Diabetes mellitus ([address]     GERD (gastroesophageal reflux disease)     Hyperlipidemia     Hypothyroidism      Family History   Problem Relation Age of Onset    Other Mother         aneurysm    Stroke Father     Heart Attack Father     No Known Problems Sister     Diabetes Brother     Heart Attack Brother 48    No Known Problems Sister      Past Surgical History:   Procedure Laterality Date    CARPAL TUNNEL RELEASE Right 3/18/2020    RIGHT CARPAL TUNNEL RELEASE performed by Ricky Roberts MD at [address] [place] [address] CATH, 5/> YRS  7/4/2020          Social History     Tobacco Use    Smoking status: Never    Smokeless tobacco: Never   Vaping Use    Vaping Use: Never used   Substance Use Topics    Alcohol use: Never    Drug use: Never           Diagnostic studies:    No results found. Procedures: An unna boot  compressive wrap was applied to the right lower extremity. It's purpose is to  decrease  the amount of edema present, and to allow proper healing of the soft tissues. The patient was instructed to keep the unna boot clean, dry and intact until the next follow up visit. The patient was instructed to look for signs of redness, irritation, blistering and pain. If these or any other symptoms were to develop, they were advised to contact the office immediately for reevaluation. Exam:  VASCULAR: Pedal pulses palpable right foot.   CFT brisk digits 1 through 5 right foot  NEUROLOGICAL: Epicritic sensations intact right lower extremity  DERMATOLOGICAL: Edema noted without ecchymotic skin changes into the hindfoot and ankle region right. MUSCULOSKELETAL: Tenderness noted to palpation along the posterior tibial tendon from just distal to the medial malleolus to the navicular tuberosity region. Tenderness also noted into the sinus tarsi region with active range of motion right ankle and subtalar joint. Plan Per [address] was seen today for foot pain. Diagnoses and all orders for this visit:    Right foot sprain, initial encounter    Localized edema    Right foot pain  -     XR FOOT RIGHT (MIN 3 VIEWS); Future    Difficulty walking        New patient consultation and management  Radiographs were reviewed 3 views right foot. No acute osseous abnormalities noted. Compression dressing applied symptomatic right lower extremity. Was also placed in a immobilizing boot right lower extremity to be utilized with her every day activities to reduce current symptoms. The patient was dispensed a/an pneumatic walker. It is medically necessary and within the standard of care for the patient's diagnosis. Its purpose is to immobilize the lower extremity, decrease edema, and promote healing of the affected area. The patient was instructed on is proper application and use. The patient was also instructed to watch for areas of running, irritation, blister formation, or any other signs of abnormal pressure. If this occurs, the patient is to contact the office immediately. The ABN was reviewed and signed by the patient prior to leaving this appointment. Patient was to limit her daily activities and continue with oral anti-inflammatories to reduce current symptoms. Patient will be followed up at a later date for continued evaluation and management. We will proceed with potential further imaging studies if symptoms persist.  She was advised to call the office with any questions or concerns in the interim.       Seen By: Calista Rivas DPM    Electronically signed by Calista Rivas DPM on 10/31/2022 at 12:42 PM      This note was created using voice recognition software. The note was reviewed however may contain grammatical errors.

## 2022-11-04 ENCOUNTER — HOSPITAL ENCOUNTER (OUTPATIENT)
Dept: PHYSICAL THERAPY | Age: 42
Setting detail: THERAPIES SERIES
Discharge: HOME | End: 2022-11-04
Payer: COMMERCIAL

## 2022-11-04 PROCEDURE — G0283 ELEC STIM OTHER THAN WOUND: HCPCS

## 2022-11-04 PROCEDURE — 97161 PT EVAL LOW COMPLEX 20 MIN: CPT

## 2022-11-04 PROCEDURE — 97140 MANUAL THERAPY 1/> REGIONS: CPT

## 2022-11-04 ASSESSMENT — PAIN DESCRIPTION - ORIENTATION: ORIENTATION: RIGHT

## 2022-11-04 ASSESSMENT — PAIN DESCRIPTION - LOCATION: LOCATION: FOOT;ANKLE;LEG

## 2022-11-04 ASSESSMENT — PAIN DESCRIPTION - DESCRIPTORS: DESCRIPTORS: ACHING;SHOOTING;JABBING

## 2022-11-04 ASSESSMENT — PAIN DESCRIPTION - PAIN TYPE: TYPE: CHRONIC PAIN

## 2022-11-04 ASSESSMENT — PAIN DESCRIPTION - FREQUENCY: FREQUENCY: CONTINUOUS

## 2022-11-04 ASSESSMENT — PAIN - FUNCTIONAL ASSESSMENT: PAIN_FUNCTIONAL_ASSESSMENT: PREVENTS OR INTERFERES SOME ACTIVE ACTIVITIES AND ADLS

## 2022-11-04 ASSESSMENT — PAIN SCALES - GENERAL: PAINLEVEL_OUTOF10: 5

## 2022-11-04 ASSESSMENT — PAIN DESCRIPTION - ONSET: ONSET: ON-GOING

## 2022-11-04 NOTE — PROGRESS NOTES
Physical Therapy    Physical Therapy: Initial Evaluation    Patient: Patricia Verde (85 y.o. female)   Examination Date:   Plan of Care Certification Period: 2022 to        :  1980 ;    Confirmed: Yes MRN: [id number]  CSN: [id number]   Insurance: Payor: Leah Baker / Plan: [address] [place] [address] / Product Type: *No Product type* /   Insurance ID: [id number] - ([facility]) Secondary Insurance (if applicable): PARAMOUNT ADVANTAGE   Referring Physician: Puja Jade DO     PCP: Zeyad Velázquez DO Visits to Date/Visits Approved:   /      No Show/Cancelled Appts:   /       Medical Diagnosis: Sprain of right foot, subsequent encounter [S93.601D]  Plantar fasciitis of right foot [M72.2]    Treatment Diagnosis:       PERTINENT MEDICAL HISTORY           Medical History:     Past Medical History:   Diagnosis Date    Carpal tunnel syndrome, right     Diabetes mellitus ([facility] [address]     GERD (gastroesophageal reflux disease)     Hyperlipidemia     Hypothyroidism      Surgical History:   Past Surgical History:   Procedure Laterality Date    CARPAL TUNNEL RELEASE Right 3/18/2020    RIGHT CARPAL TUNNEL RELEASE performed by Gurwinder Williamson MD at [facility], 5/> YRS  2020            Medications:   Current Outpatient Medications:     levothyroxine (SYNTHROID) 75 MCG tablet, Take 1 tablet by mouth daily, Disp: 90 tablet, Rfl: 1    blood glucose test strips (ONETOUCH ULTRA) strip, 1 each by In Vitro route daily As needed. , Disp: 100 each, Rfl: 3    fluticasone (FLONASE) 50 MCG/ACT nasal spray, 2 sprays by Each Nostril route daily, Disp: 1 each, Rfl: 1    glimepiride (AMARYL) 4 MG tablet, Take 1 tablet by mouth every morning (before breakfast), Disp: 30 tablet, Rfl: 3    dulaglutide (TRULICITY) 1.5 HE/5.5EV SC injection, Inject 0.5 mLs into the skin once a week, Disp: 4 Adjustable Dose Pre-filled Pen Syringe, Rfl: 5    [employer] FINEPOINT LANCETS MISC, Test TID with meals, Disp: 100 each, Rfl: 1    atorvastatin (LIPITOR) 20 MG tablet, Take 1 tablet by mouth daily, Disp: 30 tablet, Rfl: 5    Blood Glucose Monitoring Suppl (ONE TOUCH ULTRA 2) w/Device KIT, 1 kit by Does not apply route daily, Disp: 1 kit, Rfl: 0    ibuprofen (ADVIL;MOTRIN) 600 MG tablet, Take 1 tablet by mouth 3 times daily as needed for Pain, Disp: 90 tablet, Rfl: 0  Allergies: Iodine and Shellfish-derived products      SUBJECTIVE EXAMINATION     History obtained from[de-identified] Patient  Subjective History:    Subjective: Pt [place] first week of [place] and \"I was running on my toes and then it hurt really bad\". Pt did go to her PCP and imaging was done and nothing was broken. Pt reports pain increasing and being unable to do her job due to the pain and has been limping for the past 3-4 weeks. PCP referred her to a podiatrist and PT. Pt states the pain is getting worse and shooting up the inside of my leg. Pt went to the podiatrist on Monday this week and was given a CAM boot and her right ankle was wrapped in an unna boot. Pt reports having a plastic boot (CAM boot), but not wearing it as it is uncomfortable and feels like it is giving me bruises and points to her shin. Pt took off the unna boot and CAM boot today and presents in tennis shoes. Pt reports having a pain in my right foot underneath on both sides. Pt reports seeing the foot doctor next week.      Additional Pertinent Hx (if applicable):     Prior diagnostic testing[de-identified] X-ray  Previous treatments prior to current episode?: Brace      Pain Screening    Pain Screening  Patient Currently in Pain: Yes  Pain Assessment: 0-10  Pain Level: 5  Date pain first started: 07/03/22  Patient's Stated Pain Goal: 0 - No pain  Pain Type: Chronic pain  Pain Location: Foot, Ankle, Leg  Pain Orientation: Right  Pain Radiating Towards: up the medial lower leg  Pain Descriptors: Aching, Shooting, Jabbing  Pain Frequency: Continuous  Pain Onset: On-going  Functional Pain Assessment: Prevents or interferes some active activities and ADLs  Aggravating factors: Walking, Standing  Pain Management/Relieving Factors: Rest, Medications  Multiple Pain Sites: Yes    Functional Status       Social History:    Social History  Lives With: Spouse, Family  Home Layout: One level  Home Access: Stairs to enter with rails  Entrance Stairs - Rails: None  Entrance Stairs - Number of Steps: 1  Bathroom Shower/Tub: Tub/Shower unit    Occupation/Interests:   Occupation: Full time employment  Type of Occupation: Pt works at [employer]. 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 Job Duties: Prolonged standing, Other (comment) (walking)    Prior Level of Function:     Independent        Current Level of Function:          Homemaking Assistance: Independent  Homemaking Responsibilities: Yes  Ambulation Assistance: Independent  Transfer Assistance: Independent  Active : Yes  Mode of Transportation: Car    OBJECTIVE EXAMINATION   Restrictions:         WB Status: FWB    Palpation:   Right Ankle Palpation: Pain to palpation at lateral malleolus, medial plantar/arch area    Ambulation/Gait (if applicable):   Ambulation  WB Status: FWB  Ambulation  Surface: Level tile  Device: No Device  Assistance: Independent  Gait Deviations: Slow Abiola, Decreased step length  Distance: 40 feet  Comments: Gait is slow and guarded in nature, moderate antalgic gait  Stairs/Curb  Stairs?: Yes  Stairs  # Steps : 4  Stairs Height: 6\"  Rails: Bilateral (Reciprocal on ascend, non-reciprocal on descend.  Slow pace and moderate use of BUEs for task.)    Left AROM  Right AROM      General AROM LE: Left WNL    General AROM LE: Left WNL  AROM RLE (degrees)  R Ankle Dorsiflexion 0-20: 0-20  R Ankle Plantar Flexion 0-45: 0-40  R Ankle Forefoot Inversion 0-40: 0-30  R Ankle Forefoot Eversion 0-20: 0-15       Left Strength  Right Strength      General Strength Testing LE: Right WFL, Left [facility]  Ankle General Strength Testing LE: Left WNL    General Strength Testing LE: Right WFL, Left WFL  Ankle General Strength Testing LE: Left WNL  Strength RLE  R Ankle Dorsiflexion: 4-/5  R Ankle Plantar flexion: 4/5  R Ankle Inversion: 4/5  R Ankle Eversion: 4-/5        ASSESSMENT     Impression: Assessment: Pt presents approximately 4 months post initial injury. Pt with increased pain over the past several weeks with pain radiating up the medial aspect of the lower leg. Pt referred to podiatrist and on 10/31 was placed in an [employer] walking boot. Pt removed Unna boot yesterday for PT appointment today and reports continued pain. Pt with good ROM in right ankle, with end range eliciting increased pain. Pt with no radicular pain this date with palpation or end range PROM, with pain primarily localized to medial arch, and lateral malleolus area. Mild edema noted in lateral malleolus area. Initiated E-stim and moist heat for pain/edema management and pt tolerated well. Pt with follow up appointment with podiatrist next week. Body Structures, Functions, Activity Limitations Requiring Skilled Therapeutic Intervention: Decreased functional mobility , Decreased tolerance to work activity, Decreased strength, Increased pain    Statement of Medical Necessity: Physical Therapy is both indicated and medically necessary as outlined in the POC to increase the likelihood of meeting the functionally related goals stated below. Patient's Activity Tolerance: Patient limited by pain      Patient's rehabilitation potential/prognosis is considered to be: Good    Factors which may impact rehabilitation potential include: None        GOALS   Patient Goal(s): To have no pain. To be able to stand/walk at my job.   Short Term Goals Completed by 2 weeks Goal Status   Decrease pain in R ankle/foot to <2/10 New   Increase strength in R ankle to 4/5 New   Pt to ambulate with 50% improved gait stride, speed, and quality New Pt to tolerate standing at work >2 hours without required rest break New             Long Term Goals Completed by 4-6 weeks Goal Status   Pt to report no pain in R ankle/foot New   Increase strength in R ankle to 5/5 New   Pt to demonstrate smooth, canchola gait with no antalgic deficits New   Pt to tolerate progressive activity and standing on R foot >4 hours New   Pt Independent with HEP New        TREATMENT PLAN       Requires PT Follow-Up: Yes  Treatment Initiated : yes    Pt. actively involved in establishing Plan of Care and Goals: Yes  Patient/ Caregiver education and instruction: Goals, PT Role, Plan of Care, Evaluative findings             Treatment may include any combination of the following:       Frequency / Duration:  Patient to be seen 2-3x a week for 4-6 weeks weeks      Eval Complexity: Overall Evaluation : Low       PT Treatment Completed:  Modalities  Moist Heat: (CPT 45280) Treatment Reasoning    Moist heat location: Right, Ankle                       Electric Stimulation, unattended:  (CPT 03830) /   Treatment Reasoning    Patient Position: Seated  E-stim location: Right, Ankle  E-stim type: Interferential (IFC)                        Therapy Time  Individual Time In: 1100        Individual Time Out: 1210   Minutes: Eval + 30 min         Therapist Signature: Sandy Dover PT, DPT License [id number]    Date: 39/6/1143     I certify that the above Therapy Services are being furnished while the patient is under my care. I agree with the treatment plan and certify that this therapy is necessary. [de-identified] Signature:  ___________________________   Date:_______                                                                   Elver Cerna DO        Physician Comments: _______________________________________________    Please sign and return to [facility]. Please fax to the location listed below.  Emmanuel Reyes for this referral!    [facility]  [address] [place] [address]  Dept: [phone]  Fax: [phone]       POC NOTE

## 2022-11-07 ENCOUNTER — OFFICE VISIT (OUTPATIENT)
Dept: PODIATRY | Age: 42
End: 2022-11-07
Payer: COMMERCIAL

## 2022-11-07 VITALS — WEIGHT: 151 LBS | BODY MASS INDEX: 28.51 KG/M2 | HEIGHT: 61 IN

## 2022-11-07 DIAGNOSIS — R60.0 LOCALIZED EDEMA: ICD-10-CM

## 2022-11-07 DIAGNOSIS — S93.601A RIGHT FOOT SPRAIN, INITIAL ENCOUNTER: Primary | ICD-10-CM

## 2022-11-07 DIAGNOSIS — M79.671 PAIN OF RIGHT FOOT: ICD-10-CM

## 2022-11-07 DIAGNOSIS — R26.2 DIFFICULTY WALKING: ICD-10-CM

## 2022-11-07 PROCEDURE — 99999 PR OFFICE/OUTPT VISIT,PROCEDURE ONLY: CPT | Performed by: PODIATRIST

## 2022-11-07 PROCEDURE — 29580 STRAPPING UNNA BOOT: CPT | Performed by: PODIATRIST

## 2022-11-07 NOTE — PROGRESS NOTES
22     [address] [place] [address]    : 1980   Sex: female    Age: 36 y.o. Patient's PCP/Provider is:  Manjinder Conroy DO    Subjective:  Patient is seen today for follow-up regarding continued evaluation regarding right foot sprain. Stated the compression dressing and cam walker have helped reduce symptoms about 60%. She is still limiting her daily activities as instructed. Additional abnormalities noted. Chief Complaint   Patient presents with    Foot Pain     Right foot        ROS:  Const: Positives and pertinent negatives as per HPI. Musculo: Denies symptoms other than stated above. Neuro: Denies symptoms other than stated above. Skin: Denies symptoms other than stated above. Current Medications:    Current Outpatient Medications:     levothyroxine (SYNTHROID) 75 MCG tablet, Take 1 tablet by mouth daily, Disp: 90 tablet, Rfl: 1    blood glucose test strips (ONETOUCH ULTRA) strip, 1 each by In Vitro route daily As needed. , Disp: 100 each, Rfl: 3    fluticasone (FLONASE) 50 MCG/ACT nasal spray, 2 sprays by Each Nostril route daily, Disp: 1 each, Rfl: 1    glimepiride (AMARYL) 4 MG tablet, Take 1 tablet by mouth every morning (before breakfast), Disp: 30 tablet, Rfl: 3    dulaglutide (TRULICITY) 1.5 HZ/0.8KW SC injection, Inject 0.5 mLs into the skin once a week, Disp: 4 Adjustable Dose Pre-filled Pen Syringe, Rfl: 5    LIFESCAN FINEPOINT LANCETS MISC, Test TID with meals, Disp: 100 each, Rfl: 1    atorvastatin (LIPITOR) 20 MG tablet, Take 1 tablet by mouth daily, Disp: 30 tablet, Rfl: 5    Blood Glucose Monitoring Suppl (ONE TOUCH ULTRA 2) w/Device KIT, 1 kit by Does not apply route daily, Disp: 1 kit, Rfl: 0    ibuprofen (ADVIL;MOTRIN) 600 MG tablet, Take 1 tablet by mouth 3 times daily as needed for Pain, Disp: 90 tablet, Rfl: 0    Allergies:   Allergies   Allergen Reactions    Iodine Anaphylaxis    Shellfish-Derived Products Anaphylaxis, Hives, Itching and Rash       Vitals:    22 0932   Weight: 151 lb (68.5 kg)   Height: 5' 1\" (1.549 m)       Exam:  Neurovascular status unchanged. Tenderness and swelling noted into the right arch and heel region. No ecchymotic skin changes present right lower extremity. Edematous issues noted right lower extremity. Diagnostic Studies:     XR FOOT RIGHT (MIN 3 VIEWS)    Result Date: 10/31/2022  EXAMINATION: THREE XRAY VIEWS OF THE RIGHT FOOT 10/31/2022 9:07 am COMPARISON: The previous study performed 07/05/2022. HISTORY: ORDERING SYSTEM PROVIDED HISTORY: Right foot pain FINDINGS: There is no evidence of fracture or dislocation. There has been mild interval worsening of the hallux valgus deformity. A large plantar calcaneal bone spur is again noted. No other significant osseous or surrounding soft tissue abnormality is seen. No fracture or dislocation. Mild interval worsening of the hallux valgus deformity, when compared to the previous study performed 07/05/2022. Large plantar calcaneal bone spur again noted. Procedures: An unna boot  compressive wrap was applied to the right lower extremity. It's purpose is to  decrease  the amount of edema present, and to allow proper healing of the soft tissues. The patient was instructed to keep the unna boot clean, dry and intact until the next follow up visit. The patient was instructed to look for signs of redness, irritation, blistering and pain. If these or any other symptoms were to develop, they were advised to contact the office immediately for reevaluation. Plan Per [address] was seen today for foot pain. Diagnoses and all orders for this visit:    Right foot sprain, initial encounter    Localized edema    Pain of right foot    Difficulty walking      Evaluation and management  Compression dressing reapplied right lower extremity to reduce residual edema and symptoms. She is to continue with her current cam walker with all ambulatory activities.   She will be followed up at a later date for continued evaluation and management. Seen By:    Charis Coburn DPM    Electronically signed by Charis Coburn DPM on 11/7/2022 at 10:01 AM    This note was created using voice recognition software. The note was reviewed however may contain grammatical errors.

## 2022-11-07 NOTE — PROGRESS NOTES
Patient is in today for 1 week right foot pain. Patient says she is having less pain in the heel, but the boot is hurting her upper leg while wearing it.  Pcp is Vanesa Oconnell,   Last ov 10/10/22

## 2022-11-10 NOTE — PROGRESS NOTES
[facility]  Phone: [phone] Fax: [phone]     Physical Therapy  Cancellation/No-show Note  Patient Name:  Charleen Holcomb  :  1980   Date:  11/10/2022    For today's appointment patient:  [x]  Cancelled  []  Rescheduled appointment  []  No-show     Reason given by patient:  []  Patient ill  []  Conflicting appointment  []  No transportation    []  Conflict with work  []  No reason given  [x]  Other:     Comments:  Pt called  to cancel appointment for  as she has an appointment with the podiatrist on  and will call back after that appointment.     Electronically signed by:    Ko Golden PT, DPT  License [id number]

## 2022-11-11 ENCOUNTER — HOSPITAL ENCOUNTER (OUTPATIENT)
Dept: PHYSICAL THERAPY | Age: 42
Setting detail: THERAPIES SERIES
Discharge: HOME | End: 2022-11-11
Payer: COMMERCIAL

## 2022-11-14 ENCOUNTER — OFFICE VISIT (OUTPATIENT)
Dept: PODIATRY | Age: 42
End: 2022-11-14
Payer: COMMERCIAL

## 2022-11-14 VITALS — HEIGHT: 61 IN | BODY MASS INDEX: 28.51 KG/M2 | WEIGHT: 151 LBS

## 2022-11-14 DIAGNOSIS — R26.2 DIFFICULTY WALKING: ICD-10-CM

## 2022-11-14 DIAGNOSIS — S93.601A RIGHT FOOT SPRAIN, INITIAL ENCOUNTER: Primary | ICD-10-CM

## 2022-11-14 PROCEDURE — 99213 OFFICE O/P EST LOW 20 MIN: CPT | Performed by: PODIATRIST

## 2022-11-14 PROCEDURE — G8417 CALC BMI ABV UP PARAM F/U: HCPCS | Performed by: PODIATRIST

## 2022-11-14 PROCEDURE — 1036F TOBACCO NON-USER: CPT | Performed by: PODIATRIST

## 2022-11-14 PROCEDURE — G8484 FLU IMMUNIZE NO ADMIN: HCPCS | Performed by: PODIATRIST

## 2022-11-14 PROCEDURE — G8427 DOCREV CUR MEDS BY ELIG CLIN: HCPCS | Performed by: PODIATRIST

## 2022-11-14 NOTE — PROGRESS NOTES
22     [address] [place] [address]    : 1980   Sex: female    Age: 36 y.o. Patient's PCP/Provider is:  Zeyad Velázquez DO    Subjective:  Patient is seen today for follow-up regarding continued evaluation regarding right foot sprain. Overall patient is doing well at this time with current shoe gear. Patient did want to discuss other potential treatment options to prevent reoccurrence of symptoms. No other additional abnormalities noted. Chief Complaint   Patient presents with    Follow-up     Right foot        ROS:  Const: Positives and pertinent negatives as per HPI. Musculo: Denies symptoms other than stated above. Neuro: Denies symptoms other than stated above. Skin: Denies symptoms other than stated above. Current Medications:    Current Outpatient Medications:     levothyroxine (SYNTHROID) 75 MCG tablet, Take 1 tablet by mouth daily, Disp: 90 tablet, Rfl: 1    blood glucose test strips (ONETOUCH ULTRA) strip, 1 each by In Vitro route daily As needed. , Disp: 100 each, Rfl: 3    fluticasone (FLONASE) 50 MCG/ACT nasal spray, 2 sprays by Each Nostril route daily, Disp: 1 each, Rfl: 1    glimepiride (AMARYL) 4 MG tablet, Take 1 tablet by mouth every morning (before breakfast), Disp: 30 tablet, Rfl: 3    dulaglutide (TRULICITY) 1.5 IH/0.6FG SC injection, Inject 0.5 mLs into the skin once a week, Disp: 4 Adjustable Dose Pre-filled Pen Syringe, Rfl: 5    LIFESCAN FINEPOINT LANCETS MISC, Test TID with meals, Disp: 100 each, Rfl: 1    atorvastatin (LIPITOR) 20 MG tablet, Take 1 tablet by mouth daily, Disp: 30 tablet, Rfl: 5    Blood Glucose Monitoring Suppl (ONE TOUCH ULTRA 2) w/Device KIT, 1 kit by Does not apply route daily, Disp: 1 kit, Rfl: 0    ibuprofen (ADVIL;MOTRIN) 600 MG tablet, Take 1 tablet by mouth 3 times daily as needed for Pain, Disp: 90 tablet, Rfl: 0    Allergies:   Allergies   Allergen Reactions    Iodine Anaphylaxis    Shellfish-Derived Products Anaphylaxis, Hives, Itching and Rash       Vitals:    11/14/22 0931   Weight: 151 lb (68.5 kg)   Height: 5' 1\" (1.549 m)       Exam:  Neurovascular status unchanged. Increased range of motion noted right ankle and subtalar joint regions. Minimal tenderness noted into the plantar arch and along the PT tendon region right. Improved gait noted upon evaluation. Diagnostic Studies:     XR FOOT RIGHT (MIN 3 VIEWS)    Result Date: 10/31/2022  EXAMINATION: THREE XRAY VIEWS OF THE RIGHT FOOT 10/31/2022 9:07 am COMPARISON: The previous study performed 07/05/2022. HISTORY: ORDERING SYSTEM PROVIDED HISTORY: Right foot pain FINDINGS: There is no evidence of fracture or dislocation. There has been mild interval worsening of the hallux valgus deformity. A large plantar calcaneal bone spur is again noted. No other significant osseous or surrounding soft tissue abnormality is seen. No fracture or dislocation. Mild interval worsening of the hallux valgus deformity, when compared to the previous study performed 07/05/2022. Large plantar calcaneal bone spur again noted. Procedures:    None    Plan Per [address] was seen today for follow-up. Diagnoses and all orders for this visit:    Right foot sprain, initial encounter    Difficulty walking      Evaluation and management  We did discuss use of OTC insoles which patient was given information on purchasing today. She is to continue with her current shoe gear and insoles with all ambulatory activities. Will be followed up at a later date for continued evaluation and management if any additional issues arise. Seen By:    Justin Prabhakar DPM    Electronically signed by Justin Prabhakar DPM on 11/14/2022 at 10:50 AM    This note was created using voice recognition software. The note was reviewed however may contain grammatical errors.

## 2022-11-14 NOTE — PROGRESS NOTES
Patient is here for follow up right foot. Patient states she left unna boot on for 6 days. Patient states heel pain has improved but the side of her foot still has some pain.  Jesús Gupta DO  Electronically signed by Randall Villela LPN on 27/90/1855 at 9:32 AM

## 2022-12-03 DIAGNOSIS — E11.9 TYPE 2 DIABETES MELLITUS WITHOUT COMPLICATION, WITHOUT LONG-TERM CURRENT USE OF INSULIN (HCC): ICD-10-CM

## 2022-12-03 RX ORDER — FLUTICASONE PROPIONATE 50 MCG
2 SPRAY, SUSPENSION (ML) NASAL DAILY
Qty: 1 EACH | Refills: 1 | Status: SHIPPED | OUTPATIENT
Start: 2022-12-03

## 2022-12-03 RX ORDER — LEVOTHYROXINE SODIUM 0.07 MG/1
75 TABLET ORAL DAILY
Qty: 90 TABLET | Refills: 1 | Status: SHIPPED | OUTPATIENT
Start: 2022-12-03

## 2022-12-03 RX ORDER — DULAGLUTIDE 1.5 MG/.5ML
1.5 INJECTION, SOLUTION SUBCUTANEOUS WEEKLY
Qty: 4 ADJUSTABLE DOSE PRE-FILLED PEN SYRINGE | Refills: 5 | Status: SHIPPED | OUTPATIENT
Start: 2022-12-03

## 2022-12-03 RX ORDER — GLIMEPIRIDE 4 MG/1
4 TABLET ORAL
Qty: 30 TABLET | Refills: 3 | Status: SHIPPED | OUTPATIENT
Start: 2022-12-03

## 2022-12-03 RX ORDER — ATORVASTATIN CALCIUM 20 MG/1
20 TABLET, FILM COATED ORAL DAILY
Qty: 30 TABLET | Refills: 5 | Status: SHIPPED | OUTPATIENT
Start: 2022-12-03

## 2022-12-03 RX ORDER — BLOOD SUGAR DIAGNOSTIC
1 STRIP MISCELLANEOUS DAILY
Qty: 100 EACH | Refills: 3 | Status: SHIPPED | OUTPATIENT
Start: 2022-12-03

## 2023-01-01 DIAGNOSIS — E11.9 TYPE 2 DIABETES MELLITUS WITHOUT COMPLICATION, WITHOUT LONG-TERM CURRENT USE OF INSULIN (HCC): ICD-10-CM

## 2023-01-03 RX ORDER — BLOOD-GLUCOSE METER
EACH MISCELLANEOUS
Qty: 100 EACH | Refills: 1 | Status: SHIPPED | OUTPATIENT
Start: 2023-01-03

## 2023-01-03 RX ORDER — BLOOD SUGAR DIAGNOSTIC
1 STRIP MISCELLANEOUS DAILY
Qty: 100 EACH | Refills: 3 | Status: SHIPPED | OUTPATIENT
Start: 2023-01-03

## 2023-01-03 RX ORDER — GLIMEPIRIDE 4 MG/1
4 TABLET ORAL
Qty: 30 TABLET | Refills: 3 | Status: SHIPPED | OUTPATIENT
Start: 2023-01-03

## 2023-01-03 RX ORDER — DULAGLUTIDE 1.5 MG/.5ML
1.5 INJECTION, SOLUTION SUBCUTANEOUS WEEKLY
Qty: 4 ADJUSTABLE DOSE PRE-FILLED PEN SYRINGE | Refills: 5 | Status: SHIPPED | OUTPATIENT
Start: 2023-01-03

## 2023-01-03 RX ORDER — ATORVASTATIN CALCIUM 20 MG/1
20 TABLET, FILM COATED ORAL DAILY
Qty: 30 TABLET | Refills: 5 | Status: SHIPPED | OUTPATIENT
Start: 2023-01-03

## 2023-01-03 RX ORDER — FLUTICASONE PROPIONATE 50 MCG
2 SPRAY, SUSPENSION (ML) NASAL DAILY
Qty: 1 EACH | Refills: 1 | Status: SHIPPED | OUTPATIENT
Start: 2023-01-03

## 2023-02-02 RX ORDER — FLUTICASONE PROPIONATE 50 MCG
2 SPRAY, SUSPENSION (ML) NASAL DAILY
Qty: 1 EACH | Refills: 1 | Status: SHIPPED | OUTPATIENT
Start: 2023-02-02

## 2023-02-02 RX ORDER — GLIMEPIRIDE 4 MG/1
4 TABLET ORAL
Qty: 30 TABLET | Refills: 3 | Status: SHIPPED | OUTPATIENT
Start: 2023-02-02

## 2023-02-02 RX ORDER — DULAGLUTIDE 1.5 MG/.5ML
1.5 INJECTION, SOLUTION SUBCUTANEOUS WEEKLY
Qty: 4 ADJUSTABLE DOSE PRE-FILLED PEN SYRINGE | Refills: 5 | Status: SHIPPED | OUTPATIENT
Start: 2023-02-02

## 2023-02-02 RX ORDER — ATORVASTATIN CALCIUM 20 MG/1
20 TABLET, FILM COATED ORAL DAILY
Qty: 30 TABLET | Refills: 5 | Status: SHIPPED | OUTPATIENT
Start: 2023-02-02

## 2023-02-02 RX ORDER — LEVOTHYROXINE SODIUM 0.07 MG/1
75 TABLET ORAL DAILY
Qty: 90 TABLET | Refills: 1 | Status: SHIPPED | OUTPATIENT
Start: 2023-02-02

## 2023-02-04 ENCOUNTER — HOSPITAL ENCOUNTER (OUTPATIENT)
Age: 43
Discharge: HOME | End: 2023-02-04

## 2023-02-08 ENCOUNTER — TELEPHONE (OUTPATIENT)
Dept: PRIMARY CARE CLINIC | Age: 43
End: 2023-02-08

## 2023-02-08 DIAGNOSIS — E11.9 TYPE 2 DIABETES MELLITUS WITHOUT COMPLICATION, WITHOUT LONG-TERM CURRENT USE OF INSULIN (HCC): Primary | ICD-10-CM

## 2023-02-08 NOTE — TELEPHONE ENCOUNTER
----- Message from Lisha Naveed sent at 2023  1:58 PM EST -----  Subject: Referral Request    Reason for referral request? Pt states that she attempted get her routine   labs completed at [facility] but they told her that her orders have . She is requesting new orders if possible. Please return call to pt   regarding   Provider patient wants to be referred to(if known):     Provider Phone Number(if known):     Additional Information for Provider?   ---------------------------------------------------------------------------  --------------  [id number] [employer]    [phone]; OK to leave message on voicemail  ---------------------------------------------------------------------------  --------------

## 2023-02-13 DIAGNOSIS — E11.9 TYPE 2 DIABETES MELLITUS WITHOUT COMPLICATION, WITHOUT LONG-TERM CURRENT USE OF INSULIN (HCC): ICD-10-CM

## 2023-02-13 DIAGNOSIS — E03.9 ACQUIRED HYPOTHYROIDISM: Primary | ICD-10-CM

## 2023-02-13 RX ORDER — LEVOTHYROXINE SODIUM 0.1 MG/1
100 TABLET ORAL DAILY
Qty: 90 TABLET | Refills: 1 | Status: SHIPPED | OUTPATIENT
Start: 2023-02-13

## 2023-03-14 DIAGNOSIS — E11.9 TYPE 2 DIABETES MELLITUS WITHOUT COMPLICATION, WITHOUT LONG-TERM CURRENT USE OF INSULIN (HCC): ICD-10-CM

## 2023-03-14 RX ORDER — DULAGLUTIDE 1.5 MG/.5ML
1.5 INJECTION, SOLUTION SUBCUTANEOUS WEEKLY
Qty: 4 ADJUSTABLE DOSE PRE-FILLED PEN SYRINGE | Refills: 5 | Status: SHIPPED | OUTPATIENT
Start: 2023-03-14

## 2023-03-14 RX ORDER — BLOOD-GLUCOSE METER
EACH MISCELLANEOUS
Qty: 100 EACH | Refills: 1 | Status: SHIPPED | OUTPATIENT
Start: 2023-03-14

## 2023-03-14 RX ORDER — BLOOD SUGAR DIAGNOSTIC
1 STRIP MISCELLANEOUS DAILY
Qty: 100 EACH | Refills: 3 | Status: SHIPPED | OUTPATIENT
Start: 2023-03-14

## 2023-03-14 RX ORDER — FLUTICASONE PROPIONATE 50 MCG
2 SPRAY, SUSPENSION (ML) NASAL DAILY
Qty: 1 EACH | Refills: 1 | Status: SHIPPED | OUTPATIENT
Start: 2023-03-14

## 2023-03-14 RX ORDER — LEVOTHYROXINE SODIUM 0.1 MG/1
100 TABLET ORAL DAILY
Qty: 90 TABLET | Refills: 1 | Status: SHIPPED | OUTPATIENT
Start: 2023-03-14

## 2023-03-17 LAB — DIABETIC RETINOPATHY: POSITIVE

## 2023-03-31 ENCOUNTER — TELEPHONE (OUTPATIENT)
Dept: ORTHOPEDIC SURGERY | Age: 43
End: 2023-03-31

## 2023-03-31 DIAGNOSIS — M65.311 TRIGGER FINGER OF RIGHT THUMB: ICD-10-CM

## 2023-03-31 DIAGNOSIS — M65.332 ACQUIRED TRIGGER FINGER OF BOTH MIDDLE FINGERS: Primary | ICD-10-CM

## 2023-03-31 DIAGNOSIS — M65.331 ACQUIRED TRIGGER FINGER OF BOTH MIDDLE FINGERS: Primary | ICD-10-CM

## 2023-04-03 ENCOUNTER — OFFICE VISIT (OUTPATIENT)
Dept: ORTHOPEDIC SURGERY | Age: 43
End: 2023-04-03
Payer: COMMERCIAL

## 2023-04-03 VITALS — HEIGHT: 61 IN | BODY MASS INDEX: 28.32 KG/M2 | WEIGHT: 150 LBS

## 2023-04-03 DIAGNOSIS — M65.332 ACQUIRED TRIGGER FINGER OF BOTH MIDDLE FINGERS: Primary | ICD-10-CM

## 2023-04-03 DIAGNOSIS — M65.331 ACQUIRED TRIGGER FINGER OF BOTH MIDDLE FINGERS: Primary | ICD-10-CM

## 2023-04-03 PROCEDURE — 99214 OFFICE O/P EST MOD 30 MIN: CPT | Performed by: ORTHOPAEDIC SURGERY

## 2023-04-03 NOTE — PROGRESS NOTES
nerves are grossly intact. Nontender of the thumb and index ring and little fingers. Negative atrophy. Wartenberg's cross finger testing. Otherwise neurovascular intact.    DATA:    CBC:   Lab Results   Component Value Date/Time    WBC 6.7 02/10/2023 12:36 PM    RBC 4.49 02/10/2023 12:36 PM    HGB 13.3 02/10/2023 12:36 PM    HCT 39.3 02/10/2023 12:36 PM    MCV 87.5 02/10/2023 12:36 PM    MCH 29.6 02/10/2023 12:36 PM    MCHC 33.8 02/10/2023 12:36 PM    RDW 13.2 02/10/2023 12:36 PM     02/10/2023 12:36 PM    MPV 12.2 02/10/2023 12:36 PM     PT/INR:    Lab Results   Component Value Date/Time    PROTIME 15.6 07/05/2020 03:40 AM    INR 1.3 07/05/2020 03:40 AM       Radiology Review: There is a bilateral hands today in the office AP lateral obliques were negative for acute fracture dislocations.  Joint spaces well-preserved.  Left tissues within normal limits  Impression office x-rays: Negative for acute fracture dislocations bilateral hands    IMPRESSION:  Right greater than left recurrent middle finger trigger now with right middle finger contracture measuring approximate 25 degrees.  Diabetes  GERD  S/p bilateral CTR    PLAN:    I explained today's findings with the patient.  She like to proceed with a right middle finger lease.  She knows that this will not resolve the underlying contracture but this may improve with therapy on the right.  She reports that she responded well in the left to the injection does not wish to pursue surgery on the left but would like to have a corticosteroid injection at the time of surgery on the left side.      I explained the risks, benefits, alternatives and complications of surgery with the patient including but not limited to the risks of infection, possible damage to nerves, vessels, or tendons, stiffness, loss of range of motion, scar sensitivity, wound healing complications, worsening symptoms, possible need for therapy, as well as the possible need further surgery and

## 2023-04-05 ENCOUNTER — TELEPHONE (OUTPATIENT)
Dept: ORTHOPEDIC SURGERY | Age: 43
End: 2023-04-05

## 2023-04-18 DIAGNOSIS — E11.9 TYPE 2 DIABETES MELLITUS WITHOUT COMPLICATION, WITHOUT LONG-TERM CURRENT USE OF INSULIN (HCC): ICD-10-CM

## 2023-04-18 RX ORDER — BLOOD-GLUCOSE METER
EACH MISCELLANEOUS
Qty: 100 EACH | Refills: 1 | Status: SHIPPED | OUTPATIENT
Start: 2023-04-18

## 2023-04-18 RX ORDER — FLUTICASONE PROPIONATE 50 MCG
2 SPRAY, SUSPENSION (ML) NASAL DAILY
Qty: 1 EACH | Refills: 1 | Status: SHIPPED | OUTPATIENT
Start: 2023-04-18

## 2023-04-18 RX ORDER — DULAGLUTIDE 1.5 MG/.5ML
1.5 INJECTION, SOLUTION SUBCUTANEOUS WEEKLY
Qty: 4 ADJUSTABLE DOSE PRE-FILLED PEN SYRINGE | Refills: 5 | Status: SHIPPED | OUTPATIENT
Start: 2023-04-18

## 2023-04-18 RX ORDER — BLOOD SUGAR DIAGNOSTIC
1 STRIP MISCELLANEOUS DAILY
Qty: 100 EACH | Refills: 3 | Status: SHIPPED | OUTPATIENT
Start: 2023-04-18

## 2023-05-05 ENCOUNTER — TELEPHONE (OUTPATIENT)
Dept: ORTHOPEDIC SURGERY | Age: 43
End: 2023-05-05

## 2023-05-05 DIAGNOSIS — Z98.890 S/P MUSCULOSKELETAL SYSTEM SURGERY: Primary | ICD-10-CM

## 2023-05-05 RX ORDER — OXYCODONE HYDROCHLORIDE AND ACETAMINOPHEN 5; 325 MG/1; MG/1
1 TABLET ORAL EVERY 6 HOURS PRN
Qty: 28 TABLET | Refills: 0 | Status: SHIPPED | OUTPATIENT
Start: 2023-05-05 | End: 2023-05-12

## 2023-05-05 NOTE — TELEPHONE ENCOUNTER
Percocet sent to preferred pharmacy.  Patient status post right middle finger trigger release and left middle finger trigger injection 5/5/2023    Electronically signed by Aroldo Holly DO on 5/5/2023 at 5:29 AM

## 2023-05-11 ENCOUNTER — OFFICE VISIT (OUTPATIENT)
Dept: FAMILY MEDICINE CLINIC | Age: 43
End: 2023-05-11
Payer: COMMERCIAL

## 2023-05-11 VITALS
BODY MASS INDEX: 27.75 KG/M2 | HEART RATE: 89 BPM | DIASTOLIC BLOOD PRESSURE: 74 MMHG | TEMPERATURE: 97.8 F | OXYGEN SATURATION: 99 % | RESPIRATION RATE: 18 BRPM | WEIGHT: 147 LBS | SYSTOLIC BLOOD PRESSURE: 120 MMHG | HEIGHT: 61 IN

## 2023-05-11 DIAGNOSIS — L23.1 ALLERGIC CONTACT DERMATITIS DUE TO ADHESIVES: ICD-10-CM

## 2023-05-11 DIAGNOSIS — E11.9 TYPE 2 DIABETES MELLITUS WITHOUT COMPLICATION, WITHOUT LONG-TERM CURRENT USE OF INSULIN (HCC): ICD-10-CM

## 2023-05-11 PROCEDURE — 3051F HG A1C>EQUAL 7.0%<8.0%: CPT | Performed by: FAMILY MEDICINE

## 2023-05-11 PROCEDURE — 96372 THER/PROPH/DIAG INJ SC/IM: CPT | Performed by: FAMILY MEDICINE

## 2023-05-11 PROCEDURE — 99213 OFFICE O/P EST LOW 20 MIN: CPT | Performed by: FAMILY MEDICINE

## 2023-05-11 RX ORDER — DEXAMETHASONE SODIUM PHOSPHATE 10 MG/ML
10 INJECTION INTRAMUSCULAR; INTRAVENOUS ONCE
Status: COMPLETED | OUTPATIENT
Start: 2023-05-11 | End: 2023-05-11

## 2023-05-11 RX ORDER — CETIRIZINE HYDROCHLORIDE 10 MG/1
10 TABLET ORAL DAILY
Qty: 30 TABLET | Refills: 0 | Status: SHIPPED | OUTPATIENT
Start: 2023-05-11 | End: 2023-06-10

## 2023-05-11 RX ADMIN — DEXAMETHASONE SODIUM PHOSPHATE 10 MG: 10 INJECTION INTRAMUSCULAR; INTRAVENOUS at 09:50

## 2023-05-11 SDOH — ECONOMIC STABILITY: FOOD INSECURITY: WITHIN THE PAST 12 MONTHS, YOU WORRIED THAT YOUR FOOD WOULD RUN OUT BEFORE YOU GOT MONEY TO BUY MORE.: NEVER TRUE

## 2023-05-11 SDOH — ECONOMIC STABILITY: HOUSING INSECURITY
IN THE LAST 12 MONTHS, WAS THERE A TIME WHEN YOU DID NOT HAVE A STEADY PLACE TO SLEEP OR SLEPT IN A SHELTER (INCLUDING NOW)?: NO

## 2023-05-11 SDOH — ECONOMIC STABILITY: FOOD INSECURITY: WITHIN THE PAST 12 MONTHS, THE FOOD YOU BOUGHT JUST DIDN'T LAST AND YOU DIDN'T HAVE MONEY TO GET MORE.: NEVER TRUE

## 2023-05-11 SDOH — ECONOMIC STABILITY: INCOME INSECURITY: HOW HARD IS IT FOR YOU TO PAY FOR THE VERY BASICS LIKE FOOD, HOUSING, MEDICAL CARE, AND HEATING?: NOT HARD AT ALL

## 2023-05-11 ASSESSMENT — ENCOUNTER SYMPTOMS
EYE REDNESS: 0
DIARRHEA: 0
BLOOD IN STOOL: 0
RHINORRHEA: 0
SORE THROAT: 0
COLOR CHANGE: 0
BACK PAIN: 0
EYE ITCHING: 0
APNEA: 0
EYE PAIN: 0
VOMITING: 0
NAUSEA: 0
COUGH: 0
ABDOMINAL PAIN: 0
WHEEZING: 0
CHEST TIGHTNESS: 0
CONSTIPATION: 0
SINUS PRESSURE: 0
SHORTNESS OF BREATH: 0

## 2023-05-11 ASSESSMENT — PATIENT HEALTH QUESTIONNAIRE - PHQ9
1. LITTLE INTEREST OR PLEASURE IN DOING THINGS: 0
SUM OF ALL RESPONSES TO PHQ QUESTIONS 1-9: 0
SUM OF ALL RESPONSES TO PHQ9 QUESTIONS 1 & 2: 0
2. FEELING DOWN, DEPRESSED OR HOPELESS: 0

## 2023-05-11 NOTE — PROGRESS NOTES
Chief Complaint:     Chief Complaint   Patient presents with    Rash     Right arm. From sx bandages. Diabetes         Rash  This is a new problem. The current episode started in the past 7 days. The problem is unchanged. The affected locations include the right arm, right elbow and right hand. The rash is characterized by redness and itchiness. Associated with: dressing on right arm after surgery. Pertinent negatives include no congestion, cough, diarrhea, eye pain, fatigue, fever, rhinorrhea, shortness of breath, sore throat or vomiting. Past treatments include nothing. Diabetes  She presents for her follow-up diabetic visit. She has type 2 diabetes mellitus. Her disease course has been stable. Hypoglycemia symptoms include headaches. Pertinent negatives for hypoglycemia include no dizziness or nervousness/anxiousness. Pertinent negatives for diabetes include no chest pain, no fatigue, no weakness and no weight loss. There are no hypoglycemic complications. Symptoms are stable. There are no diabetic complications. Risk factors for coronary artery disease include diabetes mellitus and dyslipidemia. Current diabetic treatment includes oral agent (monotherapy). She is compliant with treatment all of the time. Her weight is stable. When asked about meal planning, she reported none. There is no change in her home blood glucose trend. An ACE inhibitor/angiotensin II receptor blocker is not being taken. She does not see a podiatrist.Eye exam is not current. Hyperlipidemia  This is a chronic problem. The current episode started more than 1 month ago. The problem is controlled. Exacerbating diseases include diabetes and hypothyroidism. Pertinent negatives include no chest pain, myalgias or shortness of breath. Current antihyperlipidemic treatment includes statins. The current treatment provides significant improvement of lipids. There are no compliance problems.   Risk factors for coronary artery disease include

## 2023-05-15 ENCOUNTER — OFFICE VISIT (OUTPATIENT)
Dept: ORTHOPEDIC SURGERY | Age: 43
End: 2023-05-15

## 2023-05-15 DIAGNOSIS — M65.331 ACQUIRED TRIGGER FINGER OF BOTH MIDDLE FINGERS: Primary | ICD-10-CM

## 2023-05-15 DIAGNOSIS — M65.332 ACQUIRED TRIGGER FINGER OF BOTH MIDDLE FINGERS: Primary | ICD-10-CM

## 2023-05-15 PROCEDURE — 99024 POSTOP FOLLOW-UP VISIT: CPT | Performed by: ORTHOPAEDIC SURGERY

## 2023-05-15 NOTE — PROGRESS NOTES
HPI:   Patient follows up today s/p trigger finger release of her right middle finger, and left middle finger trigger injection. Does have some incisional pain on the right, also had a skin reaction, likely to the surgical prep, on the right upper extremity. She was seen by Dr. Holly Aguirre prescribed her topical cortisone cream as well as giving her a steroid shot. States that the dermatitis has not significantly improved in appearance, though the itching is better. Still having some incisional pain on the right hand. Also has radial sided numbness in the middle finger. Is ranging the finger, does not notice any clicking or locking of the digit. On the left hand, her middle finger has improved since injection. Physical Exam:  right upper extremity:   Incision clean, dry, and intact. Sutures maintained  No signs of infection  ROM appropriate, no click or locking with passive range of motion  Able to flex and extend the digit actively with normal strength  Does endorse subjectively diminished sensation in the middle finger, most notably on the radial side of the digit. Brisk capillary refill  Muscle strength 5/5 grossly  There is a fine papular rash starting at the wrist and going up to the mid arm. Approximately 5 degree flexion contracture at the PIP joint of the middle finger. Left hand: Skin intact, no redness or swelling at the injection site. Patient is able to make a competent fist, there is no clicking or locking of the middle finger with active or passive range of motion. Assessment:  Post-op right middle finger trigger release, left middle finger trigger injection  Right upper extremity contact dermatitis    Plan:  Recommend continuing finger range of motion exercises at home. We will also plan to start guided hand therapy  Follow-up with PA in 4 weeks  We will likely be able to return to work after completing some hand therapy and after incision has healed.   Sutures out in office

## 2023-05-17 DIAGNOSIS — E11.9 TYPE 2 DIABETES MELLITUS WITHOUT COMPLICATION, WITHOUT LONG-TERM CURRENT USE OF INSULIN (HCC): ICD-10-CM

## 2023-05-17 RX ORDER — BLOOD-GLUCOSE METER
1 EACH MISCELLANEOUS DAILY
Qty: 1 KIT | Refills: 0 | Status: SHIPPED | OUTPATIENT
Start: 2023-05-17

## 2023-05-17 RX ORDER — FLUTICASONE PROPIONATE 50 MCG
2 SPRAY, SUSPENSION (ML) NASAL DAILY
Qty: 1 EACH | Refills: 1 | Status: SHIPPED | OUTPATIENT
Start: 2023-05-17

## 2023-05-17 RX ORDER — ATORVASTATIN CALCIUM 20 MG/1
20 TABLET, FILM COATED ORAL DAILY
Qty: 30 TABLET | Refills: 5 | Status: SHIPPED | OUTPATIENT
Start: 2023-05-17

## 2023-05-17 RX ORDER — DULAGLUTIDE 1.5 MG/.5ML
1.5 INJECTION, SOLUTION SUBCUTANEOUS WEEKLY
Qty: 4 ADJUSTABLE DOSE PRE-FILLED PEN SYRINGE | Refills: 5 | Status: SHIPPED | OUTPATIENT
Start: 2023-05-17

## 2023-05-30 ENCOUNTER — OFFICE VISIT (OUTPATIENT)
Dept: FAMILY MEDICINE CLINIC | Age: 43
End: 2023-05-30
Payer: COMMERCIAL

## 2023-05-30 VITALS
SYSTOLIC BLOOD PRESSURE: 122 MMHG | OXYGEN SATURATION: 97 % | WEIGHT: 149 LBS | DIASTOLIC BLOOD PRESSURE: 76 MMHG | BODY MASS INDEX: 29.25 KG/M2 | HEIGHT: 60 IN | TEMPERATURE: 97.3 F | HEART RATE: 92 BPM | RESPIRATION RATE: 18 BRPM

## 2023-05-30 DIAGNOSIS — J01.90 ACUTE BACTERIAL SINUSITIS: Primary | ICD-10-CM

## 2023-05-30 DIAGNOSIS — B96.89 ACUTE BACTERIAL SINUSITIS: Primary | ICD-10-CM

## 2023-05-30 DIAGNOSIS — H10.33 ACUTE BACTERIAL CONJUNCTIVITIS OF BOTH EYES: ICD-10-CM

## 2023-05-30 PROCEDURE — 99213 OFFICE O/P EST LOW 20 MIN: CPT | Performed by: FAMILY MEDICINE

## 2023-05-30 RX ORDER — CEFDINIR 300 MG/1
300 CAPSULE ORAL 2 TIMES DAILY
Qty: 20 CAPSULE | Refills: 0 | Status: SHIPPED | OUTPATIENT
Start: 2023-05-30 | End: 2023-06-09

## 2023-05-30 RX ORDER — MOXIFLOXACIN 5 MG/ML
1 SOLUTION/ DROPS OPHTHALMIC 3 TIMES DAILY
Qty: 3 ML | Refills: 0 | Status: SHIPPED | OUTPATIENT
Start: 2023-05-30 | End: 2023-06-06

## 2023-05-30 RX ORDER — GLIMEPIRIDE 4 MG/1
TABLET ORAL
Qty: 30 TABLET | Refills: 3 | Status: SHIPPED | OUTPATIENT
Start: 2023-05-30

## 2023-05-30 ASSESSMENT — ENCOUNTER SYMPTOMS
WHEEZING: 0
EYE ITCHING: 0
CONSTIPATION: 0
CHEST TIGHTNESS: 0
SINUS PRESSURE: 0
BACK PAIN: 0
BLOOD IN STOOL: 0
SORE THROAT: 0
EYE PAIN: 0
EYE REDNESS: 1
VOMITING: 0
SHORTNESS OF BREATH: 0
RHINORRHEA: 0
APNEA: 0
EYE DISCHARGE: 1
COUGH: 0
ABDOMINAL PAIN: 0
NAUSEA: 0
COLOR CHANGE: 0
DIARRHEA: 0

## 2023-05-30 NOTE — PROGRESS NOTES
change, fatigue and fever. HENT:  Negative for congestion, ear pain, hearing loss, nosebleeds, rhinorrhea, sinus pressure and sore throat. Eyes:  Positive for discharge and redness. Negative for pain, itching and visual disturbance. Respiratory:  Negative for apnea, cough, chest tightness, shortness of breath and wheezing. Cardiovascular:  Negative for chest pain, palpitations and leg swelling. Gastrointestinal:  Negative for abdominal pain, blood in stool, constipation, diarrhea, nausea and vomiting. Endocrine: Negative. Genitourinary:  Negative for decreased urine volume, difficulty urinating, dysuria, frequency, hematuria and urgency. Musculoskeletal:  Negative for arthralgias, back pain, gait problem, myalgias and neck pain. Skin:  Negative for color change and rash. Allergic/Immunologic: Negative for environmental allergies and food allergies. Neurological:  Negative for dizziness, weakness, light-headedness, numbness and headaches. Hematological:  Negative for adenopathy. Does not bruise/bleed easily. Psychiatric/Behavioral:  Negative for behavioral problems, dysphoric mood and sleep disturbance. The patient is not nervous/anxious and is not hyperactive. All other systems reviewed and are negative. /76 (Site: Right Upper Arm, Position: Sitting, Cuff Size: Medium Adult)   Pulse 92   Temp 97.3 °F (36.3 °C) (Temporal)   Resp 18   Ht 5' (1.524 m)   Wt 149 lb (67.6 kg)   SpO2 97%   BMI 29.10 kg/m²     Physical Exam  Vitals and nursing note reviewed. Constitutional:       General: She is not in acute distress. Appearance: Normal appearance. She is well-developed. HENT:      Head: Normocephalic and atraumatic. Right Ear: Hearing, tympanic membrane and external ear normal. No tenderness. No middle ear effusion. Left Ear: Hearing, tympanic membrane and external ear normal. No tenderness. No middle ear effusion.       Nose: Congestion and rhinorrhea

## 2023-06-06 ENCOUNTER — EVALUATION (OUTPATIENT)
Dept: OCCUPATIONAL THERAPY | Age: 43
End: 2023-06-06
Payer: COMMERCIAL

## 2023-06-06 DIAGNOSIS — M65.332 ACQUIRED TRIGGER FINGER OF BOTH MIDDLE FINGERS: Primary | ICD-10-CM

## 2023-06-06 DIAGNOSIS — M65.331 ACQUIRED TRIGGER FINGER OF BOTH MIDDLE FINGERS: Primary | ICD-10-CM

## 2023-06-06 PROCEDURE — 97165 OT EVAL LOW COMPLEX 30 MIN: CPT | Performed by: OCCUPATIONAL THERAPIST

## 2023-06-06 PROCEDURE — 97110 THERAPEUTIC EXERCISES: CPT | Performed by: OCCUPATIONAL THERAPIST

## 2023-06-06 NOTE — PROGRESS NOTES
Assessment/Intervention: Initial OT eval completed. Pt  educated on diagnosis, prognosis, and rehab protocol. Prior to eval, pt has been dominantly using L hand with daily tasks, with limited use of R hand. Pain, ROM, stiffness and strength continue to still limit pt's successful completion of tasks using R dominant hand. Pt slightly painful with AROM to end ranges of wrist and digits, but once her pain subsides she will be able to tolerate increased ROM and strengthening exercises during therapy process (per protocol). No extension limitation noted at R MF PIP. Pt able to make a full fist, with limitation in R MF. Pt able to flex 3/4 for full fist with R MF. Surgical site appears well healed but slightly dense. HEP: Education completed on pain management techniques to initiate including heat vs ice modalities, elevation, compression, retrograde/soft tissue massage, and light active movement of the UE to increase circulation. Discussed integration of ergonomic techniques to begin applying to daily routine. Size S ext tube splint and finger sleeve for compression given for R MF. Pt educated on tendon glides given handouts. Pt demo'ing F understanding of HEP, will require follow up. HEP to be expanded in future sessions. Pt appears pleasant, cooperative, and willing to learn. Pt would benefit from skilled OT services to maximize independence with prior functioning. Eval Complexity: Low Complexity  Profile and History- Brief review of chart and history  Assessment of Occupational Performance and Identification of Deficits- 11 deficits identified  Clinical Decision Making- None required    Rehab Potential:                                 [x] Good  [] Fair  [] Poor        Suggested Professional Referral:       [x] No  [] Yes:  Barriers to Goal Achievement[de-identified]           [x] No  [] Yes:  Domestic Concerns:                           [x] No  [] Yes:       Patient.  Education:  [x] Plans/Goals, Risks/Benefits

## 2023-06-08 ENCOUNTER — TREATMENT (OUTPATIENT)
Dept: OCCUPATIONAL THERAPY | Age: 43
End: 2023-06-08

## 2023-06-08 DIAGNOSIS — M65.331 ACQUIRED TRIGGER FINGER OF BOTH MIDDLE FINGERS: Primary | ICD-10-CM

## 2023-06-08 DIAGNOSIS — M65.332 ACQUIRED TRIGGER FINGER OF BOTH MIDDLE FINGERS: Primary | ICD-10-CM

## 2023-06-08 NOTE — PROGRESS NOTES
Therapeutic Activity                 [x] Pain Management with/without modalities PRN  [x] Manual Therapy                      [x] Splinting                                   [x] Scar Management                   [x] Manual Edema Mobilization                     [x] GM/FM Coordination               [x] Safety retraining/education per individual diagnosis/goals                                      Patient Specific Goal: \"work on my strength in R hand\"                             GOALS (Long term same as Short term):  1) Patient will demonstrate good understanding of home program (exercises/activities/diagnosis/prognosis/goals) with % compliance. 2) Patient will demonstrate increased active/passive range of motion of their B wrist and digits to [facility] acute hospital for ADL/IADL completion. 3) Patient will demonstrate increased /pinch strength of at least 10 / 2-3 pinch pounds of each hand. 4) Patient to report decreased pain in BUEs from 5/10 to 2/10 or less with resistive functional use. 5) Increase in fine motor function as evidenced by decreased time to complete 9-hole peg test by at least 5-10 seconds each hand   6) Patient will demonstrate proper in-hand manipulation/prehension skills to be able to use eating utensils suing B hands  7) Patient will be knowledgeable of edema control techniques as evident with decreases from minimal to slight/none. 8) Patient will be knowledgeable of scar management techniques as evident in decreases of density/tenderness from minimal to slight/none. 9) Patient will demonstrate improved functional activity tolerance/motor endurance to complete light homemaking task using B hands in <15 mins, per pt report. 10) Patient will decrease QuickDASH score to 30% or less for increased participation in daily functional activities.      TODAY'S TREATMENT     Pain Level: 5/10 surgical site    Subjective:  Pt reported \"I've been feeling a slight clicking in palm area with R MF flexion

## 2023-06-16 LAB — DIABETIC RETINOPATHY: POSITIVE

## 2023-06-19 ENCOUNTER — TREATMENT (OUTPATIENT)
Dept: OCCUPATIONAL THERAPY | Age: 43
End: 2023-06-19
Payer: COMMERCIAL

## 2023-06-19 DIAGNOSIS — M65.332 ACQUIRED TRIGGER FINGER OF BOTH MIDDLE FINGERS: Primary | ICD-10-CM

## 2023-06-19 DIAGNOSIS — M65.331 ACQUIRED TRIGGER FINGER OF BOTH MIDDLE FINGERS: Primary | ICD-10-CM

## 2023-06-19 PROCEDURE — 97110 THERAPEUTIC EXERCISES: CPT | Performed by: OCCUPATIONAL THERAPIST

## 2023-06-19 PROCEDURE — 97022 WHIRLPOOL THERAPY: CPT | Performed by: OCCUPATIONAL THERAPIST

## 2023-06-19 PROCEDURE — 97140 MANUAL THERAPY 1/> REGIONS: CPT | Performed by: OCCUPATIONAL THERAPIST

## 2023-06-19 NOTE — PROGRESS NOTES
sore. Jagjit Para \"     Objective:  Updated POC to be completed by 10th session. INTERVENTION: COMPLETED: SPECIFICS/COMMENTS:   Modality:     Fluidotherapy  X -10 mins, while completing AROM exercises (wrist, digits, tendon glides)   MH     AROM/AAROM:     L wrist    -all planes, 10x  -juxaciser, 2x3 reps   L digits X  X  X    x -tendon glides, all planes  - MF DIP flex/ext, 10x  -MF PIP flex/ext, 10x  -MF MCP flex/ext, 10x  - Hook fist- into full - then hook and extend around pen 10 rep';s with 3 sec holds. PROM/Stretching:     L wrist X -all planes, 5x   L digits X  X     tendon glides, all planes  - MF PROM with full fist and hook fist.    Scar Mass/Edema Control:     L IF scar management X  X    X    x -soft tissue massage  -use of vibrator  -sensory brush for desensitization  -rolling along flex bar, 2 mins  -icing technique, 2 mins  - to wear gel pad at night with tubigrip size C doubled at  area. [place]qu[place] all night. L edema control X -Retrograde massage tips of digits to wrist- done intermittently between activities and thenar muscle massage   Therapeutic Activity:     L digits   X   -chi balls, 2 mins  -in hand manipulation of 20 penneys while maintaining  full  grasp         Strengthening/TE:     L hand / wrist       X    x - yellow putty given today for gripping and manipulating 3 min . Roll and 3 pt pinch 3 sets  added to HEP   - yellow therabar - 6# bend down 15 rep's and twist - pressure on the scar also. - black gripper 25 #  poms poms 3 - 5 sec holds - did 20         Other:     Trigger finger prevention L hand  x - icing to A-1 pulley area 1-3 x's, wear figure 8 splint as much as possible, no resistive gripping or pinching- reveiwed today. Skilled Judgement & Intervention X  Provided in selection of appropriate interventions w/ pt education in proper ex tech and purpose for ex's, correct performance of therapeutic ex was facilitated w/ verbal and visual cueing.    Figure 8 splint  Cont to wear

## 2023-06-26 ENCOUNTER — TREATMENT (OUTPATIENT)
Dept: OCCUPATIONAL THERAPY | Age: 43
End: 2023-06-26
Payer: COMMERCIAL

## 2023-06-26 DIAGNOSIS — M65.332 ACQUIRED TRIGGER FINGER OF BOTH MIDDLE FINGERS: Primary | ICD-10-CM

## 2023-06-26 DIAGNOSIS — M65.331 ACQUIRED TRIGGER FINGER OF BOTH MIDDLE FINGERS: Primary | ICD-10-CM

## 2023-06-26 PROCEDURE — 97530 THERAPEUTIC ACTIVITIES: CPT | Performed by: OCCUPATIONAL THERAPIST

## 2023-06-26 PROCEDURE — 97022 WHIRLPOOL THERAPY: CPT | Performed by: OCCUPATIONAL THERAPIST

## 2023-06-26 PROCEDURE — 97110 THERAPEUTIC EXERCISES: CPT | Performed by: OCCUPATIONAL THERAPIST

## 2023-06-26 PROCEDURE — 97140 MANUAL THERAPY 1/> REGIONS: CPT | Performed by: OCCUPATIONAL THERAPIST

## 2023-06-28 ENCOUNTER — TREATMENT (OUTPATIENT)
Dept: OCCUPATIONAL THERAPY | Age: 43
End: 2023-06-28
Payer: COMMERCIAL

## 2023-06-28 DIAGNOSIS — M65.331 ACQUIRED TRIGGER FINGER OF BOTH MIDDLE FINGERS: Primary | ICD-10-CM

## 2023-06-28 DIAGNOSIS — M65.332 ACQUIRED TRIGGER FINGER OF BOTH MIDDLE FINGERS: Primary | ICD-10-CM

## 2023-06-28 PROCEDURE — 97022 WHIRLPOOL THERAPY: CPT | Performed by: OCCUPATIONAL THERAPIST

## 2023-06-28 PROCEDURE — 97110 THERAPEUTIC EXERCISES: CPT | Performed by: OCCUPATIONAL THERAPIST

## 2023-06-28 PROCEDURE — 97140 MANUAL THERAPY 1/> REGIONS: CPT | Performed by: OCCUPATIONAL THERAPIST

## 2023-07-03 ENCOUNTER — TELEPHONE (OUTPATIENT)
Dept: OCCUPATIONAL THERAPY | Age: 43
End: 2023-07-03

## 2023-07-03 NOTE — TELEPHONE ENCOUNTER
Pt left a message on the answering machine that she needed to cx all her OT appointments as she is out of state with an emergency. She will call back when she is back in town and can resume. Pt on hold at this time from OT.   If pt does not call back by a month from now she will be D/C'ed from [address] [place] [address] OT./L, CHT  # [phone]

## 2023-07-07 DIAGNOSIS — E11.9 TYPE 2 DIABETES MELLITUS WITHOUT COMPLICATION, WITHOUT LONG-TERM CURRENT USE OF INSULIN (HCC): ICD-10-CM

## 2023-07-07 RX ORDER — BLOOD-GLUCOSE METER
EACH MISCELLANEOUS
Qty: 100 EACH | Refills: 1 | Status: SHIPPED | OUTPATIENT
Start: 2023-07-07

## 2023-07-07 RX ORDER — BLOOD SUGAR DIAGNOSTIC
1 STRIP MISCELLANEOUS DAILY
Qty: 100 EACH | Refills: 3 | Status: SHIPPED | OUTPATIENT
Start: 2023-07-07

## 2023-07-07 RX ORDER — DULAGLUTIDE 1.5 MG/.5ML
1.5 INJECTION, SOLUTION SUBCUTANEOUS WEEKLY
Qty: 4 ADJUSTABLE DOSE PRE-FILLED PEN SYRINGE | Refills: 5 | Status: SHIPPED | OUTPATIENT
Start: 2023-07-07

## 2023-07-12 ENCOUNTER — OFFICE VISIT (OUTPATIENT)
Dept: ENDOCRINOLOGY | Age: 43
End: 2023-07-12
Payer: COMMERCIAL

## 2023-07-12 VITALS
DIASTOLIC BLOOD PRESSURE: 76 MMHG | OXYGEN SATURATION: 99 % | HEIGHT: 60 IN | WEIGHT: 152 LBS | HEART RATE: 78 BPM | BODY MASS INDEX: 29.84 KG/M2 | SYSTOLIC BLOOD PRESSURE: 120 MMHG

## 2023-07-12 DIAGNOSIS — E03.9 HYPOTHYROIDISM, UNSPECIFIED TYPE: Primary | ICD-10-CM

## 2023-07-12 DIAGNOSIS — E03.9 HYPOTHYROIDISM, UNSPECIFIED TYPE: ICD-10-CM

## 2023-07-12 DIAGNOSIS — E55.9 VITAMIN D DEFICIENCY: ICD-10-CM

## 2023-07-12 LAB
T4 FREE SERPL-MCNC: 1.26 NG/DL (ref 0.93–1.7)
TSH SERPL-MCNC: 0.62 UIU/ML (ref 0.27–4.2)

## 2023-07-12 PROCEDURE — 99204 OFFICE O/P NEW MOD 45 MIN: CPT | Performed by: INTERNAL MEDICINE

## 2023-07-12 NOTE — PROGRESS NOTES
[facility] Department of Endocrinology Diabetes and Metabolism   [address] [place] [address]   Phone: [phone]  Fax: [phone]    Date of Service: 7/12/2023  Primary Care Physician: Kaitlynn Samayoa DO  Referring physician: Kaitlynn Samayoa DO  Provider: Marlo Jackson MD        Reason for the visit:  Primary Hypothyroidism    History of Present Illness: The history is provided by the patient. No  was used. Accuracy of the patient data is excellent. Wei Bone is a very pleasant 36 y.o. female seen today for management of hypothyroidism     The patient was diagnosed with hypothyroidism 2015 and since then has been on thyroid hormones replacement. The patient is currently on Levothyroxine 100 mcg daily. The patient taking Levothyroxine in the morning on an empty stomach, waiting one hour before eating, avoiding multivitamins containing calcium or iron with it    Not missing doses     Pt reports hard time falling sleep, because of shoulder pain     Wt fluctuate       PAST MEDICAL HISTORY   Past Medical History:   Diagnosis Date    Carpal tunnel syndrome, right     Diabetes mellitus ([address])     GERD (gastroesophageal reflux disease)     Hyperlipidemia     Hypothyroidism        PAST SURGICAL HISTORY   Past Surgical History:   Procedure Laterality Date    CARPAL TUNNEL RELEASE Right 3/18/2020    RIGHT CARPAL TUNNEL RELEASE performed by Kim Pierce MD at [facility]. PICC CATH, 5/> YRS  7/4/2020            SOCIAL HISTORY   Tobacco:   reports that she has never smoked. She has never used smokeless tobacco.  Alcohol:   reports no history of alcohol use. Drugs:   reports no history of drug use.     FAMILY HISTORY   Family History   Problem Relation Age of Onset    Other Mother         aneurysm    Stroke Father     Heart Attack Father     No Known Problems Sister     Diabetes Brother     Heart Attack Brother 50    No Known

## 2023-07-14 ENCOUNTER — TELEPHONE (OUTPATIENT)
Dept: ENDOCRINOLOGY | Age: 43
End: 2023-07-14

## 2023-07-15 LAB
THYROGLOB IGG SER-ACNC: <12 IU/ML (ref 0–40)
THYROPEROXIDASE IGG SERPL-ACNC: <4 IU/ML (ref 0–25)

## 2023-07-16 PROBLEM — E55.9 VITAMIN D DEFICIENCY: Status: ACTIVE | Noted: 2023-07-16

## 2023-08-07 RX ORDER — LEVOTHYROXINE SODIUM 0.07 MG/1
TABLET ORAL
Qty: 90 TABLET | Refills: 1 | Status: SHIPPED | OUTPATIENT
Start: 2023-08-07

## 2023-09-23 DIAGNOSIS — E11.9 TYPE 2 DIABETES MELLITUS WITHOUT COMPLICATION, WITHOUT LONG-TERM CURRENT USE OF INSULIN (HCC): ICD-10-CM

## 2023-09-25 RX ORDER — BLOOD-GLUCOSE METER
EACH MISCELLANEOUS
Qty: 100 EACH | Refills: 1 | Status: SHIPPED | OUTPATIENT
Start: 2023-09-25

## 2023-09-25 RX ORDER — BLOOD SUGAR DIAGNOSTIC
1 STRIP MISCELLANEOUS DAILY
Qty: 100 EACH | Refills: 3 | Status: SHIPPED | OUTPATIENT
Start: 2023-09-25

## 2023-09-25 RX ORDER — GLIMEPIRIDE 4 MG/1
4 TABLET ORAL
Qty: 30 TABLET | Refills: 3 | Status: SHIPPED | OUTPATIENT
Start: 2023-09-25

## 2023-09-25 RX ORDER — ATORVASTATIN CALCIUM 20 MG/1
20 TABLET, FILM COATED ORAL DAILY
Qty: 30 TABLET | Refills: 5 | Status: SHIPPED | OUTPATIENT
Start: 2023-09-25

## 2023-09-25 RX ORDER — FLUTICASONE PROPIONATE 50 MCG
2 SPRAY, SUSPENSION (ML) NASAL DAILY
Qty: 1 EACH | Refills: 1 | Status: SHIPPED | OUTPATIENT
Start: 2023-09-25

## 2023-09-25 RX ORDER — LEVOTHYROXINE SODIUM 0.1 MG/1
100 TABLET ORAL DAILY
Qty: 90 TABLET | Refills: 1 | Status: SHIPPED | OUTPATIENT
Start: 2023-09-25

## 2023-09-25 RX ORDER — DULAGLUTIDE 1.5 MG/.5ML
1.5 INJECTION, SOLUTION SUBCUTANEOUS WEEKLY
Qty: 4 ADJUSTABLE DOSE PRE-FILLED PEN SYRINGE | Refills: 5 | Status: SHIPPED | OUTPATIENT
Start: 2023-09-25

## 2023-10-10 ENCOUNTER — TELEPHONE (OUTPATIENT)
Dept: OCCUPATIONAL THERAPY | Age: 43
End: 2023-10-10

## 2023-12-27 ENCOUNTER — TELEPHONE (OUTPATIENT)
Dept: PRIMARY CARE CLINIC | Age: 43
End: 2023-12-27

## 2023-12-27 DIAGNOSIS — E11.9 TYPE 2 DIABETES MELLITUS WITHOUT COMPLICATION, WITHOUT LONG-TERM CURRENT USE OF INSULIN (HCC): Primary | ICD-10-CM

## 2023-12-27 RX ORDER — SEMAGLUTIDE 0.68 MG/ML
0.5 INJECTION, SOLUTION SUBCUTANEOUS WEEKLY
Qty: 3 ML | Refills: 5 | Status: SHIPPED | OUTPATIENT
Start: 2023-12-27

## 2023-12-27 NOTE — TELEPHONE ENCOUNTER
Pt calling asking if you can prescribe her Ozempic rather than Trulicity. Pt wants to try Ozempic because she believes it will be cheaper.

## 2024-01-29 RX ORDER — GLIMEPIRIDE 4 MG/1
4 TABLET ORAL
Qty: 30 TABLET | Refills: 0 | Status: SHIPPED | OUTPATIENT
Start: 2024-01-29

## 2024-02-09 ENCOUNTER — OFFICE VISIT (OUTPATIENT)
Dept: PRIMARY CARE CLINIC | Age: 44
End: 2024-02-09
Payer: COMMERCIAL

## 2024-02-09 VITALS
SYSTOLIC BLOOD PRESSURE: 122 MMHG | WEIGHT: 156 LBS | HEIGHT: 60 IN | TEMPERATURE: 97.3 F | OXYGEN SATURATION: 99 % | DIASTOLIC BLOOD PRESSURE: 76 MMHG | BODY MASS INDEX: 30.63 KG/M2 | HEART RATE: 77 BPM

## 2024-02-09 DIAGNOSIS — S67.02XA CRUSHING INJURY OF LEFT THUMB, INITIAL ENCOUNTER: Primary | ICD-10-CM

## 2024-02-09 DIAGNOSIS — S60.10XA SUBUNGUAL HEMATOMA OF DIGIT OF HAND, INITIAL ENCOUNTER: ICD-10-CM

## 2024-02-09 PROCEDURE — 99213 OFFICE O/P EST LOW 20 MIN: CPT | Performed by: FAMILY MEDICINE

## 2024-02-09 RX ORDER — AMOXICILLIN AND CLAVULANATE POTASSIUM 875; 125 MG/1; MG/1
1 TABLET, FILM COATED ORAL 2 TIMES DAILY
Qty: 14 TABLET | Refills: 0 | Status: SHIPPED | OUTPATIENT
Start: 2024-02-09 | End: 2024-02-16

## 2024-02-09 ASSESSMENT — ENCOUNTER SYMPTOMS
EYE PAIN: 0
DIARRHEA: 0
SHORTNESS OF BREATH: 0
NAUSEA: 0
EYE REDNESS: 0
ABDOMINAL PAIN: 0
COLOR CHANGE: 0
BLOOD IN STOOL: 0
RHINORRHEA: 0
SINUS PRESSURE: 0
CONSTIPATION: 0
COUGH: 0
BACK PAIN: 0
EYE ITCHING: 0
APNEA: 0
VOMITING: 0
CHEST TIGHTNESS: 0
WHEEZING: 0
SORE THROAT: 0

## 2024-02-09 ASSESSMENT — PATIENT HEALTH QUESTIONNAIRE - PHQ9
SUM OF ALL RESPONSES TO PHQ QUESTIONS 1-9: 0
SUM OF ALL RESPONSES TO PHQ QUESTIONS 1-9: 0
2. FEELING DOWN, DEPRESSED OR HOPELESS: 0
SUM OF ALL RESPONSES TO PHQ QUESTIONS 1-9: 0
1. LITTLE INTEREST OR PLEASURE IN DOING THINGS: 0
SUM OF ALL RESPONSES TO PHQ9 QUESTIONS 1 & 2: 0

## 2024-02-09 NOTE — PROGRESS NOTES
Chief Complaint:     Chief Complaint   Patient presents with    Finger Injury     Slammed left thumb in door last week.          Hand Injury   The incident occurred 5 to 7 days ago (2/2/24). The incident occurred at home. Injury mechanism: door closure (car) The pain is present in the left hand and left fingers (left thumb). The quality of the pain is described as aching. The pain is moderate. The pain has been Worsening since the incident. Pertinent negatives include no chest pain or numbness. The symptoms are aggravated by movement, lifting and palpation. She has tried nothing for the symptoms. The treatment provided no relief.       Patient Active Problem List   Diagnosis    Gastroesophageal reflux disease without esophagitis    Hiatal hernia    Carpal tunnel syndrome of right wrist    Type 2 diabetes mellitus without complication, without long-term current use of insulin (HCC)    Hypothyroidism    Mixed hyperlipidemia    Retinal artery occlusion    Right foot sprain, initial encounter    Localized edema    Right foot pain    Acquired trigger finger of both middle fingers    Vitamin D deficiency       Past Medical History:   Diagnosis Date    Carpal tunnel syndrome, right     Diabetes mellitus (HCC)     GERD (gastroesophageal reflux disease)     Hyperlipidemia     Hypothyroidism        Past Surgical History:   Procedure Laterality Date    CARPAL TUNNEL RELEASE Right 3/18/2020    RIGHT CARPAL TUNNEL RELEASE performed by Toni Farmer MD at [facility] OR     INSERT PICC CATH, 5/> YRS  7/4/2020            Current Outpatient Medications   Medication Sig Dispense Refill    amoxicillin-clavulanate (AUGMENTIN) 875-125 MG per tablet Take 1 tablet by mouth 2 times daily for 7 days 14 tablet 0    glimepiride (AMARYL) 4 MG tablet take 1 tablet by mouth every morning before breakfast 30 tablet 0    Semaglutide,0.25 or 0.5MG/DOS, (OZEMPIC, 0.25 OR 0.5 MG/DOSE,) 2 MG/3ML SOPN Inject 0.5 mg into the skin once a week 3 mL 5

## 2024-02-16 ENCOUNTER — OFFICE VISIT (OUTPATIENT)
Dept: PRIMARY CARE CLINIC | Age: 44
End: 2024-02-16
Payer: COMMERCIAL

## 2024-02-16 VITALS
OXYGEN SATURATION: 99 % | SYSTOLIC BLOOD PRESSURE: 126 MMHG | DIASTOLIC BLOOD PRESSURE: 76 MMHG | TEMPERATURE: 97.8 F | HEIGHT: 60 IN | WEIGHT: 155 LBS | BODY MASS INDEX: 30.43 KG/M2 | RESPIRATION RATE: 18 BRPM | HEART RATE: 86 BPM

## 2024-02-16 DIAGNOSIS — E11.9 TYPE 2 DIABETES MELLITUS WITHOUT COMPLICATION, WITHOUT LONG-TERM CURRENT USE OF INSULIN (HCC): ICD-10-CM

## 2024-02-16 DIAGNOSIS — E03.9 ACQUIRED HYPOTHYROIDISM: ICD-10-CM

## 2024-02-16 DIAGNOSIS — E55.9 VITAMIN D DEFICIENCY: ICD-10-CM

## 2024-02-16 DIAGNOSIS — E78.2 MIXED HYPERLIPIDEMIA: ICD-10-CM

## 2024-02-16 DIAGNOSIS — Z00.01 ENCOUNTER FOR WELL ADULT EXAM WITH ABNORMAL FINDINGS: Primary | ICD-10-CM

## 2024-02-16 PROBLEM — H34.9 RETINAL ARTERY OCCLUSION: Status: RESOLVED | Noted: 2021-04-02 | Resolved: 2024-02-16

## 2024-02-16 PROBLEM — K21.9 GASTROESOPHAGEAL REFLUX DISEASE WITHOUT ESOPHAGITIS: Status: RESOLVED | Noted: 2020-02-18 | Resolved: 2024-02-16

## 2024-02-16 PROBLEM — M79.671 RIGHT FOOT PAIN: Status: RESOLVED | Noted: 2022-10-31 | Resolved: 2024-02-16

## 2024-02-16 PROBLEM — K44.9 HIATAL HERNIA: Status: RESOLVED | Noted: 2020-02-18 | Resolved: 2024-02-16

## 2024-02-16 PROBLEM — R60.0 LOCALIZED EDEMA: Status: RESOLVED | Noted: 2022-10-31 | Resolved: 2024-02-16

## 2024-02-16 PROCEDURE — 99396 PREV VISIT EST AGE 40-64: CPT | Performed by: FAMILY MEDICINE

## 2024-02-16 NOTE — PROGRESS NOTES
Neurological:      General: No focal deficit present.      Mental Status: She is alert and oriented to person, place, and time.      Cranial Nerves: No cranial nerve deficit.      Deep Tendon Reflexes: Reflexes are normal and symmetric. Reflexes normal.   Psychiatric:         Mood and Affect: Mood normal.         Assessment   Plan   1. Encounter for well adult exam with abnormal findings  -     Comprehensive Metabolic Panel; Future  -     Lipid Panel; Future  2. Type 2 diabetes mellitus without complication, without long-term current use of insulin (HCC)  -     CBC; Future  -     Comprehensive Metabolic Panel; Future  -     Hemoglobin A1C; Future  -     Lipid Panel; Future  -     TSH; Future  -     Microalbumin, Ur; Future  3. Acquired hypothyroidism  -     TSH; Future  4. Mixed hyperlipidemia  -     CBC; Future  -     Comprehensive Metabolic Panel; Future  -     Lipid Panel; Future  -     TSH; Future  5. Vitamin D deficiency  -     Vitamin D 25 Hydroxy; Future         Personalized Preventive Plan   Current Health Maintenance Status    There is no immunization history on file for this patient.     Health Maintenance   Topic Date Due    Hepatitis B vaccine (1 of 3 - 3-dose series) Never done    COVID-19 Vaccine (1) Never done    Pneumococcal 0-64 years Vaccine (1 - PCV) Never done    Diabetic foot exam  Never done    HIV screen  Never done    DTaP/Tdap/Td vaccine (1 - Tdap) Never done    Cervical cancer screen  Never done    Flu vaccine (1) Never done    A1C test (Diabetic or Prediabetic)  02/10/2024    Diabetic Alb to Cr ratio (uACR) test  02/10/2024    Lipids  02/10/2024    GFR test (Diabetes, CKD 3-4, OR last GFR 15-59)  02/10/2024    Diabetic retinal exam  06/16/2024    Depression Screen  02/09/2025    Hepatitis C screen  Completed    Hepatitis A vaccine  Aged Out    Hib vaccine  Aged Out    HPV vaccine  Aged Out    Polio vaccine  Aged Out    Meningococcal (ACWY) vaccine  Aged Out     Recommendations for

## 2024-02-19 ENCOUNTER — OFFICE VISIT (OUTPATIENT)
Dept: ENDOCRINOLOGY | Age: 44
End: 2024-02-19
Payer: COMMERCIAL

## 2024-02-19 VITALS
BODY MASS INDEX: 29.64 KG/M2 | SYSTOLIC BLOOD PRESSURE: 122 MMHG | HEIGHT: 61 IN | OXYGEN SATURATION: 99 % | HEART RATE: 79 BPM | RESPIRATION RATE: 18 BRPM | WEIGHT: 157 LBS | DIASTOLIC BLOOD PRESSURE: 84 MMHG

## 2024-02-19 DIAGNOSIS — E03.9 HYPOTHYROIDISM, UNSPECIFIED TYPE: Primary | ICD-10-CM

## 2024-02-19 DIAGNOSIS — E55.9 VITAMIN D DEFICIENCY: ICD-10-CM

## 2024-02-19 PROCEDURE — 99214 OFFICE O/P EST MOD 30 MIN: CPT | Performed by: INTERNAL MEDICINE

## 2024-02-19 RX ORDER — LEVOTHYROXINE SODIUM 0.1 MG/1
100 TABLET ORAL DAILY
Qty: 30 TABLET | Refills: 11 | Status: SHIPPED | OUTPATIENT
Start: 2024-02-19

## 2024-02-19 NOTE — PROGRESS NOTES
[facility] Department of Endocrinology Diabetes and Metabolism   [address] [place] [id number]   Phone: [phone]  Fax: [phone]    Date of Service: 2/19/2024  Primary Care Physician: Lio Quezada DO  Referring physician: No ref. provider found  Provider: London Hammonds MD        Reason for the visit:  Primary Hypothyroidism    History of Present Illness:  The history is provided by the patient. No  was used. Accuracy of the patient data is excellent.         Linda John is a very pleasant 43 y.o. female seen today for management of hypothyroidism     The patient was diagnosed with hypothyroidism 2015 and since then has been on thyroid hormones replacement.     The patient is currently on Levothyroxine 100 mcg daily. The patient taking Levothyroxine in the morning on an empty stomach, waiting one hour before eating, avoiding multivitamins containing calcium or iron with it    The patient feels very good on the current dose.  She is due for blood work now    PAST MEDICAL HISTORY   Past Medical History:   Diagnosis Date    Carpal tunnel syndrome, right     Diabetes mellitus (HCC)     GERD (gastroesophageal reflux disease)     Hyperlipidemia     Hypothyroidism        PAST SURGICAL HISTORY   Past Surgical History:   Procedure Laterality Date    CARPAL TUNNEL RELEASE Right 3/18/2020    RIGHT CARPAL TUNNEL RELEASE performed by Toni Farmer MD at [facility] OR     INSERT PICC CATH, 5/> YRS  7/4/2020            SOCIAL HISTORY   Tobacco:   reports that she has never smoked. She has never used smokeless tobacco.  Alcohol:   reports no history of alcohol use.  Drugs:   reports no history of drug use.    FAMILY HISTORY   Family History   Problem Relation Age of Onset    Other Mother         aneurysm    Stroke Father     Heart Attack Father     No Known Problems Sister     Diabetes Brother     Heart Attack Brother 50    No Known Problems Sister

## 2024-02-24 DIAGNOSIS — E11.9 TYPE 2 DIABETES MELLITUS WITHOUT COMPLICATION, WITHOUT LONG-TERM CURRENT USE OF INSULIN (HCC): ICD-10-CM

## 2024-02-26 DIAGNOSIS — Z00.01 ENCOUNTER FOR WELL ADULT EXAM WITH ABNORMAL FINDINGS: ICD-10-CM

## 2024-02-26 DIAGNOSIS — E11.9 TYPE 2 DIABETES MELLITUS WITHOUT COMPLICATION, WITHOUT LONG-TERM CURRENT USE OF INSULIN (HCC): ICD-10-CM

## 2024-02-26 DIAGNOSIS — E78.2 MIXED HYPERLIPIDEMIA: ICD-10-CM

## 2024-02-26 DIAGNOSIS — E03.9 HYPOTHYROIDISM, UNSPECIFIED TYPE: ICD-10-CM

## 2024-02-26 DIAGNOSIS — E03.9 ACQUIRED HYPOTHYROIDISM: ICD-10-CM

## 2024-02-26 DIAGNOSIS — E55.9 VITAMIN D DEFICIENCY: ICD-10-CM

## 2024-02-26 LAB
ALBUMIN SERPL-MCNC: 4.4 G/DL (ref 3.5–5.2)
ALP BLD-CCNC: 85 U/L (ref 35–104)
ALT SERPL-CCNC: 23 U/L (ref 0–32)
ANION GAP SERPL CALCULATED.3IONS-SCNC: 19 MMOL/L (ref 7–16)
AST SERPL-CCNC: 23 U/L (ref 0–31)
BILIRUB SERPL-MCNC: 0.4 MG/DL (ref 0–1.2)
BUN BLDV-MCNC: 13 MG/DL (ref 6–20)
CALCIUM SERPL-MCNC: 9.8 MG/DL (ref 8.6–10.2)
CHLORIDE BLD-SCNC: 103 MMOL/L (ref 98–107)
CHOLESTEROL: 317 MG/DL
CO2: 18 MMOL/L (ref 22–29)
CREAT SERPL-MCNC: 0.7 MG/DL (ref 0.5–1)
CREATININE URINE: 122.3 MG/DL (ref 29–226)
GFR SERPL CREATININE-BSD FRML MDRD: >60 ML/MIN/1.73M2
GLUCOSE BLD-MCNC: 132 MG/DL (ref 74–99)
HBA1C MFR BLD: 8.3 % (ref 4–5.6)
HCT VFR BLD CALC: 43.5 % (ref 34–48)
HDLC SERPL-MCNC: 46 MG/DL
HEMOGLOBIN: 14.4 G/DL (ref 11.5–15.5)
LDL CHOLESTEROL: 227 MG/DL
MCH RBC QN AUTO: 29.6 PG (ref 26–35)
MCHC RBC AUTO-ENTMCNC: 33.1 G/DL (ref 32–34.5)
MCV RBC AUTO: 89.5 FL (ref 80–99.9)
MICROALBUMIN/CREAT 24H UR: 62 MG/L (ref 0–19)
MICROALBUMIN/CREAT UR-RTO: 50 MCG/MG CREAT (ref 0–30)
PDW BLD-RTO: 12.6 % (ref 11.5–15)
PLATELET # BLD: 195 K/UL (ref 130–450)
PMV BLD AUTO: 11.8 FL (ref 7–12)
POTASSIUM SERPL-SCNC: 4.5 MMOL/L (ref 3.5–5)
RBC # BLD: 4.86 M/UL (ref 3.5–5.5)
SODIUM BLD-SCNC: 140 MMOL/L (ref 132–146)
TOTAL PROTEIN: 8.2 G/DL (ref 6.4–8.3)
TRIGL SERPL-MCNC: 221 MG/DL
TSH SERPL DL<=0.05 MIU/L-ACNC: 1.93 UIU/ML (ref 0.27–4.2)
VITAMIN D 25-HYDROXY: 17.5 NG/ML (ref 30–100)
VLDLC SERPL CALC-MCNC: 44 MG/DL
WBC # BLD: 6.8 K/UL (ref 4.5–11.5)

## 2024-02-26 RX ORDER — SEMAGLUTIDE 0.68 MG/ML
0.5 INJECTION, SOLUTION SUBCUTANEOUS WEEKLY
Qty: 3 ML | Refills: 5 | Status: SHIPPED | OUTPATIENT
Start: 2024-02-26

## 2024-02-27 RX ORDER — ERGOCALCIFEROL 1.25 MG/1
50000 CAPSULE ORAL WEEKLY
Qty: 12 CAPSULE | Refills: 1 | Status: SHIPPED | OUTPATIENT
Start: 2024-02-27

## 2024-02-27 RX ORDER — ATORVASTATIN CALCIUM 40 MG/1
40 TABLET, FILM COATED ORAL DAILY
Qty: 90 TABLET | Refills: 1 | Status: SHIPPED | OUTPATIENT
Start: 2024-02-27

## 2024-02-28 RX ORDER — GLIMEPIRIDE 4 MG/1
4 TABLET ORAL
Qty: 30 TABLET | Refills: 0 | Status: SHIPPED | OUTPATIENT
Start: 2024-02-28

## 2024-03-25 ENCOUNTER — OFFICE VISIT (OUTPATIENT)
Dept: ORTHOPEDIC SURGERY | Age: 44
End: 2024-03-25
Payer: COMMERCIAL

## 2024-03-25 DIAGNOSIS — M65.341 ACQUIRED TRIGGER FINGER OF RIGHT RING FINGER: Primary | ICD-10-CM

## 2024-03-25 PROCEDURE — 99203 OFFICE O/P NEW LOW 30 MIN: CPT | Performed by: STUDENT IN AN ORGANIZED HEALTH CARE EDUCATION/TRAINING PROGRAM

## 2024-03-25 PROCEDURE — 20550 NJX 1 TENDON SHEATH/LIGAMENT: CPT | Performed by: STUDENT IN AN ORGANIZED HEALTH CARE EDUCATION/TRAINING PROGRAM

## 2024-03-25 RX ORDER — TRIAMCINOLONE ACETONIDE 40 MG/ML
20 INJECTION, SUSPENSION INTRA-ARTICULAR; INTRAMUSCULAR ONCE
Status: COMPLETED | OUTPATIENT
Start: 2024-03-25 | End: 2024-03-25

## 2024-03-25 RX ORDER — BUPIVACAINE HYDROCHLORIDE 2.5 MG/ML
0.5 INJECTION, SOLUTION INFILTRATION; PERINEURAL ONCE
Status: COMPLETED | OUTPATIENT
Start: 2024-03-25 | End: 2024-03-25

## 2024-03-25 RX ORDER — GLIMEPIRIDE 4 MG/1
4 TABLET ORAL
Qty: 30 TABLET | Refills: 0 | Status: SHIPPED | OUTPATIENT
Start: 2024-03-25

## 2024-03-25 RX ADMIN — TRIAMCINOLONE ACETONIDE 20 MG: 40 INJECTION, SUSPENSION INTRA-ARTICULAR; INTRAMUSCULAR at 09:30

## 2024-03-25 RX ADMIN — BUPIVACAINE HYDROCHLORIDE 1.25 MG: 2.5 INJECTION, SOLUTION INFILTRATION; PERINEURAL at 09:30

## 2024-03-25 NOTE — PROGRESS NOTES
New Wrist/Hand Patient Visit     CHIEF COMPLAINT:   Chief Complaint   Patient presents with    New Patient     Right ring trigger finger, two months. Left middle trigger finger, several months        HPI:      Linda John is a 43 y.o. year old female who is right-hand dominant.  She has a history of a right middle finger trigger finger release on the right carpal tunnel release with Dr. Farmer last year.  She is not injuring her right ring finger.  She describes pain in her palm and catching having to manually open her finger.  This is worse when she tries to  things.  She has had no recent treatment for this.  This been going on for about 2 months.  He does have some triggering on the left side which is not really bothering her.    PAST MEDICAL HISTORY  Past Medical History:   Diagnosis Date    Carpal tunnel syndrome, right     Diabetes mellitus (HCC)     GERD (gastroesophageal reflux disease)     Hyperlipidemia     Hypothyroidism        PAST SURGICAL HISTORY  Past Surgical History:   Procedure Laterality Date    CARPAL TUNNEL RELEASE Right 3/18/2020    RIGHT CARPAL TUNNEL RELEASE performed by Toni Farmer MD at [facility] OR     INSERT PICC CATH, 5/> YRS  7/4/2020            FAMILY HISTORY   Family History   Problem Relation Age of Onset    Other Mother         aneurysm    Stroke Father     Heart Attack Father     No Known Problems Sister     Diabetes Brother     Heart Attack Brother 50    No Known Problems Sister        SOCIAL HISTORY  Social History     Occupational History    Not on file   Tobacco Use    Smoking status: Never    Smokeless tobacco: Never   Vaping Use    Vaping Use: Never used   Substance and Sexual Activity    Alcohol use: Never    Drug use: Never    Sexual activity: Not on file       CURRENT MEDICATIONS     Current Outpatient Medications:     glimepiride (AMARYL) 4 MG tablet, take 1 tablet by mouth IN THE MORNING before breakfast, Disp: 30 tablet, Rfl: 0    atorvastatin (LIPITOR)

## 2024-04-09 RX ORDER — GLIMEPIRIDE 4 MG/1
4 TABLET ORAL
Qty: 30 TABLET | Refills: 0 | Status: SHIPPED | OUTPATIENT
Start: 2024-04-09

## 2024-04-09 RX ORDER — FLUTICASONE PROPIONATE 50 MCG
2 SPRAY, SUSPENSION (ML) NASAL DAILY
Qty: 1 EACH | Refills: 1 | Status: SHIPPED | OUTPATIENT
Start: 2024-04-09

## 2024-04-26 LAB — DIABETIC RETINOPATHY: POSITIVE

## 2024-05-01 LAB
T4 FREE: 1 NG/DL (ref 0.9–1.7)
TSH SERPL DL<=0.05 MIU/L-ACNC: 6.54 UIU/ML (ref 0.27–4.2)

## 2024-05-02 ENCOUNTER — TELEPHONE (OUTPATIENT)
Dept: ENDOCRINOLOGY | Age: 44
End: 2024-05-02

## 2024-05-02 DIAGNOSIS — E03.9 HYPOTHYROIDISM, UNSPECIFIED TYPE: Primary | ICD-10-CM

## 2024-05-03 NOTE — TELEPHONE ENCOUNTER
Notify patient  Thyroid hormones are low.  Before adjusting her dose please check if she was missing any doses for the past 6 to 8 weeks.  Missing even a few doses will significantly affect her level.  If the patient was not missing any doses, then we will in change levothyroxine dose from 100 mcg daily to 125 mcg daily and recheck thyroid level in 6 to 8 weeks.  Please check with the patient about missing doses and go ahead with increasing the dose if she was not missing any doses.

## 2024-05-23 ENCOUNTER — TELEPHONE (OUTPATIENT)
Dept: ADMINISTRATIVE | Age: 44
End: 2024-05-23

## 2024-05-23 NOTE — TELEPHONE ENCOUNTER
Pt is having occasional chest pain and heaviness. Would like a check up with Dr Dunn. LOV 9/3/2021  [phone]

## 2024-05-29 ENCOUNTER — OFFICE VISIT (OUTPATIENT)
Dept: CARDIOLOGY CLINIC | Age: 44
End: 2024-05-29
Payer: COMMERCIAL

## 2024-05-29 VITALS
HEIGHT: 61 IN | DIASTOLIC BLOOD PRESSURE: 68 MMHG | BODY MASS INDEX: 29.45 KG/M2 | WEIGHT: 156 LBS | HEART RATE: 75 BPM | SYSTOLIC BLOOD PRESSURE: 118 MMHG | RESPIRATION RATE: 18 BRPM

## 2024-05-29 DIAGNOSIS — R07.9 CHEST PAIN, UNSPECIFIED TYPE: ICD-10-CM

## 2024-05-29 DIAGNOSIS — R06.02 SHORTNESS OF BREATH: ICD-10-CM

## 2024-05-29 DIAGNOSIS — E78.2 MIXED HYPERLIPIDEMIA: Primary | ICD-10-CM

## 2024-05-29 PROCEDURE — 99214 OFFICE O/P EST MOD 30 MIN: CPT | Performed by: INTERNAL MEDICINE

## 2024-05-29 PROCEDURE — 93000 ELECTROCARDIOGRAM COMPLETE: CPT | Performed by: INTERNAL MEDICINE

## 2024-05-29 NOTE — PROGRESS NOTES
CHIEF COMPLAINT: Chest pain/Abnormal EKG/Palpitations    HISTORY OF PRESENT ILLNESS: Patient is a 43 y.o. female seen at the request of Lio Quezada DO.      Patient presents for chest pain.     Patient complains of chest pain. Onset was 2 weeks ago, with unchanged course since that time. The patient describes the pain as intermittent, occurring with increasing frequency, dull, precordial, and sharp in nature, does not radiate. Patient rates pain as a 8/10 in intensity.  Associated symptoms are chest pain, chest pressure/discomfort, and dyspnea. Aggravating factors are housework and walking.  Alleviating factors are: rest.     Past Medical History:   Diagnosis Date    Carpal tunnel syndrome, right     Diabetes mellitus (HCC)     GERD (gastroesophageal reflux disease)     Hyperlipidemia     Hypothyroidism        Patient Active Problem List   Diagnosis    Carpal tunnel syndrome of right wrist    Type 2 diabetes mellitus without complication, without long-term current use of insulin (HCC)    Hypothyroidism    Mixed hyperlipidemia    Right foot sprain, initial encounter    Acquired trigger finger of both middle fingers    Vitamin D deficiency       Allergies   Allergen Reactions    Iodine Anaphylaxis    Shellfish-Derived Products Anaphylaxis, Hives, Itching and Rash       Current Outpatient Medications   Medication Sig Dispense Refill    fluticasone (FLONASE) 50 MCG/ACT nasal spray 2 sprays by Each Nostril route daily 1 each 1    glimepiride (AMARYL) 4 MG tablet Take 1 tablet by mouth every morning (before breakfast) 30 tablet 0    atorvastatin (LIPITOR) 40 MG tablet Take 1 tablet by mouth daily 90 tablet 1    vitamin D (ERGOCALCIFEROL) 1.25 MG (86998 UT) CAPS capsule Take 1 capsule by mouth once a week 12 capsule 1    Semaglutide,0.25 or 0.5MG/DOS, (OZEMPIC, 0.25 OR 0.5 MG/DOSE,) 2 MG/3ML SOPN Inject 0.5 mg into the skin once a week 3 mL 5    levothyroxine (SYNTHROID) 100 MCG tablet Take 1 tablet by mouth

## 2024-06-03 RX ORDER — GLIMEPIRIDE 4 MG/1
4 TABLET ORAL
Qty: 30 TABLET | Refills: 0 | Status: SHIPPED | OUTPATIENT
Start: 2024-06-03

## 2024-06-06 ENCOUNTER — TELEPHONE (OUTPATIENT)
Dept: CARDIOLOGY | Age: 44
End: 2024-06-06

## 2024-06-06 NOTE — TELEPHONE ENCOUNTER
Hi,    Nuclear stress test for this patient was denied    Reason for Denial:   Your doctor told us that you have chest pain and shortness of breath. Your doctor is checking you for heart disease. Your doctor ordered a special heart test. This test measures blood flow to the heart. This test should be used if you are at moderate or high risk (more than 15 percent) for heart disease. Age, gender and the character of chest pain are used to calculate this risk. There also should be certain abnormal findings on your heart tracing (ECG) that would cause a treadmill ECG stress test to be unclear. We reviewed the notes we received. The notes do not show that you meet all of the required criteria. So, we cannot approve this request as medically necessary. We used [facility] Medical Benefits Management Clinical Guideline titled Imaging of the Heart, Myocardial Perfusion Imaging to make this decision. You may view this guideline at [email or web address].     Please Advise on how to proceed.  Thanks!

## 2024-06-07 DIAGNOSIS — I25.10 CORONARY ARTERY DISEASE, UNSPECIFIED VESSEL OR LESION TYPE, UNSPECIFIED WHETHER ANGINA PRESENT, UNSPECIFIED WHETHER NATIVE OR TRANSPLANTED HEART: Primary | ICD-10-CM

## 2024-06-07 DIAGNOSIS — R07.9 CHEST PAIN, UNSPECIFIED TYPE: ICD-10-CM

## 2024-06-07 NOTE — TELEPHONE ENCOUNTER
Can we try arranging stress echo? Dx CP/ELKE Dunn D.O.  Cardiologist  Cardiology, [facility]

## 2024-06-07 NOTE — TELEPHONE ENCOUNTER
Prior auth required for a stress echo scheduled 7/3 at 9:00am [facility]   CPT code 27157  Dx chest pain R07.9 and BEDOYA  R06.09

## 2024-06-12 NOTE — TELEPHONE ENCOUNTER
No prior authorization needed for patient.    Reference number: Rajni BUCKLEY 6/12/24. 1:37pm      Phone number: [phone]

## 2024-06-14 ENCOUNTER — HOSPITAL ENCOUNTER (OUTPATIENT)
Dept: CARDIOLOGY | Age: 44
Discharge: HOME | End: 2024-06-14
Attending: INTERNAL MEDICINE

## 2024-07-02 RX ORDER — GLIMEPIRIDE 4 MG/1
4 TABLET ORAL
Qty: 30 TABLET | Refills: 0 | Status: SHIPPED | OUTPATIENT
Start: 2024-07-02

## 2024-07-15 ENCOUNTER — HOSPITAL ENCOUNTER (OUTPATIENT)
Age: 44
Discharge: HOME | End: 2024-07-17
Payer: COMMERCIAL

## 2024-07-15 ENCOUNTER — TELEPHONE (OUTPATIENT)
Dept: CARDIOLOGY CLINIC | Age: 44
End: 2024-07-15

## 2024-07-15 VITALS
SYSTOLIC BLOOD PRESSURE: 132 MMHG | BODY MASS INDEX: 28.32 KG/M2 | WEIGHT: 150 LBS | DIASTOLIC BLOOD PRESSURE: 94 MMHG | HEIGHT: 61 IN

## 2024-07-15 DIAGNOSIS — R07.9 CHEST PAIN, UNSPECIFIED TYPE: ICD-10-CM

## 2024-07-15 DIAGNOSIS — I25.10 CORONARY ARTERY DISEASE, UNSPECIFIED VESSEL OR LESION TYPE, UNSPECIFIED WHETHER ANGINA PRESENT, UNSPECIFIED WHETHER NATIVE OR TRANSPLANTED HEART: ICD-10-CM

## 2024-07-15 LAB
ECHO BSA: 1.71 M2
STRESS BASELINE DIAS BP: 94 MMHG
STRESS BASELINE HR: 79 BPM
STRESS BASELINE SYS BP: 132 MMHG
STRESS ESTIMATED WORKLOAD: 10 METS
STRESS PEAK DIAS BP: 81 MMHG
STRESS PEAK SYS BP: 179 MMHG
STRESS PERCENT HR ACHIEVED: 97 %
STRESS POST PEAK HR: 169 BPM
STRESS RATE PRESSURE PRODUCT: NORMAL BPM*MMHG
STRESS TARGET HR: 175 BPM

## 2024-07-15 PROCEDURE — 93320 DOPPLER ECHO COMPLETE: CPT | Performed by: INTERNAL MEDICINE

## 2024-07-15 PROCEDURE — 93325 DOPPLER ECHO COLOR FLOW MAPG: CPT | Performed by: INTERNAL MEDICINE

## 2024-07-15 PROCEDURE — 93017 CV STRESS TEST TRACING ONLY: CPT

## 2024-07-15 PROCEDURE — 93016 CV STRESS TEST SUPVJ ONLY: CPT | Performed by: INTERNAL MEDICINE

## 2024-07-15 PROCEDURE — 93350 STRESS TTE ONLY: CPT | Performed by: INTERNAL MEDICINE

## 2024-07-15 PROCEDURE — 93018 CV STRESS TEST I&R ONLY: CPT | Performed by: INTERNAL MEDICINE

## 2024-07-15 NOTE — TELEPHONE ENCOUNTER
----- Message from Elva Dunn DO sent at 7/15/2024 11:00 AM EDT -----  Please notify patient that their stress echo results are normal.

## 2024-08-23 ENCOUNTER — OFFICE VISIT (OUTPATIENT)
Dept: FAMILY MEDICINE CLINIC | Age: 44
End: 2024-08-23
Payer: COMMERCIAL

## 2024-08-23 VITALS
DIASTOLIC BLOOD PRESSURE: 72 MMHG | BODY MASS INDEX: 29.07 KG/M2 | HEART RATE: 89 BPM | HEIGHT: 61 IN | SYSTOLIC BLOOD PRESSURE: 122 MMHG | OXYGEN SATURATION: 98 % | WEIGHT: 154 LBS | TEMPERATURE: 97 F | RESPIRATION RATE: 18 BRPM

## 2024-08-23 DIAGNOSIS — R10.32 ACUTE LEFT LOWER QUADRANT PAIN: ICD-10-CM

## 2024-08-23 DIAGNOSIS — R31.9 HEMATURIA, UNSPECIFIED TYPE: ICD-10-CM

## 2024-08-23 DIAGNOSIS — R10.32 ACUTE LEFT LOWER QUADRANT PAIN: Primary | ICD-10-CM

## 2024-08-23 LAB
ALBUMIN: 4.1 G/DL (ref 3.5–5.2)
ALP BLD-CCNC: 128 U/L (ref 35–104)
ALT SERPL-CCNC: 36 U/L (ref 0–32)
ANION GAP SERPL CALCULATED.3IONS-SCNC: 13 MMOL/L (ref 7–16)
AST SERPL-CCNC: 28 U/L (ref 0–31)
BASOPHILS ABSOLUTE: 0.1 K/UL (ref 0–0.2)
BASOPHILS RELATIVE PERCENT: 2 % (ref 0–2)
BILIRUB SERPL-MCNC: 0.3 MG/DL (ref 0–1.2)
BUN BLDV-MCNC: 17 MG/DL (ref 6–20)
CALCIUM SERPL-MCNC: 9.5 MG/DL (ref 8.6–10.2)
CHLORIDE BLD-SCNC: 101 MMOL/L (ref 98–107)
CO2: 20 MMOL/L (ref 22–29)
CREAT SERPL-MCNC: 0.7 MG/DL (ref 0.5–1)
EOSINOPHILS ABSOLUTE: 0.32 K/UL (ref 0.05–0.5)
EOSINOPHILS RELATIVE PERCENT: 5 % (ref 0–6)
GFR, ESTIMATED: >90 ML/MIN/1.73M2
GLUCOSE BLD-MCNC: 218 MG/DL (ref 74–99)
HCT VFR BLD CALC: 43.4 % (ref 34–48)
HEMOGLOBIN: 14.5 G/DL (ref 11.5–15.5)
IMMATURE GRANULOCYTES %: 0 % (ref 0–5)
IMMATURE GRANULOCYTES ABSOLUTE: <0.03 K/UL (ref 0–0.58)
LIPASE: 64 U/L (ref 13–60)
LYMPHOCYTES ABSOLUTE: 2.35 K/UL (ref 1.5–4)
LYMPHOCYTES RELATIVE PERCENT: 35 % (ref 20–42)
MCH RBC QN AUTO: 30 PG (ref 26–35)
MCHC RBC AUTO-ENTMCNC: 33.4 G/DL (ref 32–34.5)
MCV RBC AUTO: 89.9 FL (ref 80–99.9)
MONOCYTES ABSOLUTE: 0.58 K/UL (ref 0.1–0.95)
MONOCYTES RELATIVE PERCENT: 9 % (ref 2–12)
NEUTROPHILS ABSOLUTE: 3.44 K/UL (ref 1.8–7.3)
NEUTROPHILS RELATIVE PERCENT: 51 % (ref 43–80)
PDW BLD-RTO: 13.3 % (ref 11.5–15)
PLATELET # BLD: 237 K/UL (ref 130–450)
PMV BLD AUTO: 11.8 FL (ref 7–12)
POTASSIUM SERPL-SCNC: 3.8 MMOL/L (ref 3.5–5)
RBC # BLD: 4.83 M/UL (ref 3.5–5.5)
SODIUM BLD-SCNC: 134 MMOL/L (ref 132–146)
TOTAL PROTEIN: 7.8 G/DL (ref 6.4–8.3)
WBC # BLD: 6.8 K/UL (ref 4.5–11.5)

## 2024-08-23 PROCEDURE — 99214 OFFICE O/P EST MOD 30 MIN: CPT | Performed by: FAMILY MEDICINE

## 2024-08-23 RX ORDER — METRONIDAZOLE 500 MG/1
500 TABLET ORAL 2 TIMES DAILY
Qty: 20 TABLET | Refills: 0 | Status: SHIPPED | OUTPATIENT
Start: 2024-08-23 | End: 2024-09-02

## 2024-08-23 RX ORDER — CEFDINIR 300 MG/1
300 CAPSULE ORAL 2 TIMES DAILY
Qty: 20 CAPSULE | Refills: 0 | Status: SHIPPED | OUTPATIENT
Start: 2024-08-23 | End: 2024-09-02

## 2024-08-23 SDOH — ECONOMIC STABILITY: FOOD INSECURITY: WITHIN THE PAST 12 MONTHS, THE FOOD YOU BOUGHT JUST DIDN'T LAST AND YOU DIDN'T HAVE MONEY TO GET MORE.: NEVER TRUE

## 2024-08-23 SDOH — ECONOMIC STABILITY: INCOME INSECURITY: HOW HARD IS IT FOR YOU TO PAY FOR THE VERY BASICS LIKE FOOD, HOUSING, MEDICAL CARE, AND HEATING?: NOT HARD AT ALL

## 2024-08-23 SDOH — ECONOMIC STABILITY: FOOD INSECURITY: WITHIN THE PAST 12 MONTHS, YOU WORRIED THAT YOUR FOOD WOULD RUN OUT BEFORE YOU GOT MONEY TO BUY MORE.: NEVER TRUE

## 2024-08-23 ASSESSMENT — ENCOUNTER SYMPTOMS
CHEST TIGHTNESS: 0
EYE ITCHING: 0
SINUS PRESSURE: 0
WHEEZING: 0
COLOR CHANGE: 0
EYE REDNESS: 0
NAUSEA: 1
APNEA: 0
EYE PAIN: 0
COUGH: 0
ABDOMINAL PAIN: 1
VOMITING: 0
BACK PAIN: 1
CONSTIPATION: 1
SHORTNESS OF BREATH: 0
RHINORRHEA: 0
DIARRHEA: 1
BLOOD IN STOOL: 0
SORE THROAT: 0

## 2024-08-23 ASSESSMENT — CROHNS DISEASE ACTIVITY INDEX (CDAI): CDAI SCORE: 0

## 2024-08-23 NOTE — PROGRESS NOTES
Negative for decreased urine volume, difficulty urinating, dysuria, frequency, hematuria, pelvic pain and urgency.   Musculoskeletal:  Positive for back pain and myalgias. Negative for arthralgias, gait problem and neck pain.   Skin:  Negative for color change and rash.   Allergic/Immunologic: Negative for environmental allergies and food allergies.   Neurological:  Negative for dizziness, tingling, weakness, light-headedness, numbness, headaches and paresthesias.   Hematological:  Negative for adenopathy. Does not bruise/bleed easily.   Psychiatric/Behavioral:  Negative for behavioral problems, dysphoric mood and sleep disturbance. The patient is not nervous/anxious and is not hyperactive.    All other systems reviewed and are negative.        /72   Pulse 89   Temp 97 °F (36.1 °C)   Resp 18   Ht 1.549 m (5' 0.98\")   Wt 69.9 kg (154 lb)   SpO2 98%   BMI 29.11 kg/m²     Physical Exam  Vitals and nursing note reviewed.   Constitutional:       General: She is not in acute distress.     Appearance: Normal appearance. She is well-developed.   HENT:      Head: Normocephalic and atraumatic.      Right Ear: Hearing, tympanic membrane and external ear normal. No tenderness. No middle ear effusion.      Left Ear: Hearing, tympanic membrane and external ear normal. No tenderness.  No middle ear effusion.      Nose: Nose normal. No congestion or rhinorrhea.      Right Turbinates: Not enlarged.      Left Turbinates: Not enlarged.      Mouth/Throat:      Mouth: Mucous membranes are moist.      Tongue: No lesions.      Pharynx: Oropharynx is clear. No oropharyngeal exudate or posterior oropharyngeal erythema.   Eyes:      General: No scleral icterus.     Conjunctiva/sclera: Conjunctivae normal.      Pupils: Pupils are equal, round, and reactive to light.   Neck:      Thyroid: No thyromegaly.   Cardiovascular:      Rate and Rhythm: Normal rate and regular rhythm.      Heart sounds: Normal heart sounds. No murmur

## 2024-08-24 ENCOUNTER — HOSPITAL ENCOUNTER (OUTPATIENT)
Dept: CT IMAGING | Age: 44
End: 2024-08-24
Attending: FAMILY MEDICINE
Payer: COMMERCIAL

## 2024-08-24 DIAGNOSIS — R10.32 ACUTE LEFT LOWER QUADRANT PAIN: ICD-10-CM

## 2024-08-24 LAB
BILIRUBIN, URINE: NEGATIVE
COLOR, UA: YELLOW
GLUCOSE URINE: 250 MG/DL
KETONES, URINE: NEGATIVE MG/DL
LEUKOCYTE ESTERASE, URINE: NEGATIVE
NITRITE, URINE: NEGATIVE
PH, URINE: 6 (ref 5–9)
PROTEIN UA: NEGATIVE MG/DL
RBC UA: ABNORMAL /HPF
SPECIFIC GRAVITY UA: 1.01 (ref 1–1.03)
TURBIDITY: CLEAR
URINE HGB: NEGATIVE
UROBILINOGEN, URINE: 0.2 EU/DL (ref 0–1)
WBC UA: ABNORMAL /HPF

## 2024-08-24 PROCEDURE — 74176 CT ABD & PELVIS W/O CONTRAST: CPT

## 2024-08-25 LAB
CULTURE: NORMAL
SPECIMEN DESCRIPTION: NORMAL

## 2024-09-09 RX ORDER — GLIMEPIRIDE 4 MG/1
4 TABLET ORAL
Qty: 90 TABLET | Refills: 0 | Status: SHIPPED | OUTPATIENT
Start: 2024-09-09

## 2024-09-09 RX ORDER — DULAGLUTIDE 1.5 MG/.5ML
1.5 INJECTION, SOLUTION SUBCUTANEOUS WEEKLY
Qty: 4 ADJUSTABLE DOSE PRE-FILLED PEN SYRINGE | Refills: 5 | Status: SHIPPED | OUTPATIENT
Start: 2024-09-09

## 2024-09-09 RX ORDER — GLIMEPIRIDE 4 MG/1
4 TABLET ORAL
Qty: 30 TABLET | Refills: 0 | Status: SHIPPED
Start: 2024-09-09 | End: 2024-09-09

## 2024-10-17 DIAGNOSIS — E11.9 TYPE 2 DIABETES MELLITUS WITHOUT COMPLICATION, WITHOUT LONG-TERM CURRENT USE OF INSULIN (HCC): ICD-10-CM

## 2024-10-18 RX ORDER — SEMAGLUTIDE 0.68 MG/ML
0.5 INJECTION, SOLUTION SUBCUTANEOUS WEEKLY
Qty: 3 ML | Refills: 5 | Status: SHIPPED | OUTPATIENT
Start: 2024-10-18

## 2024-10-18 RX ORDER — GLIMEPIRIDE 4 MG/1
4 TABLET ORAL
Qty: 90 TABLET | Refills: 0 | Status: SHIPPED | OUTPATIENT
Start: 2024-10-18

## 2024-10-22 DIAGNOSIS — E11.9 TYPE 2 DIABETES MELLITUS WITHOUT COMPLICATION, WITHOUT LONG-TERM CURRENT USE OF INSULIN (HCC): ICD-10-CM

## 2024-10-22 RX ORDER — SEMAGLUTIDE 0.68 MG/ML
0.5 INJECTION, SOLUTION SUBCUTANEOUS WEEKLY
Qty: 3 ML | Refills: 5 | Status: SHIPPED | OUTPATIENT
Start: 2024-10-22

## 2024-11-15 DIAGNOSIS — E11.9 TYPE 2 DIABETES MELLITUS WITHOUT COMPLICATION, WITHOUT LONG-TERM CURRENT USE OF INSULIN (HCC): ICD-10-CM

## 2024-11-15 RX ORDER — GLIMEPIRIDE 4 MG/1
4 TABLET ORAL
Qty: 90 TABLET | Refills: 0 | Status: SHIPPED | OUTPATIENT
Start: 2024-11-15

## 2024-11-15 RX ORDER — SEMAGLUTIDE 0.68 MG/ML
0.5 INJECTION, SOLUTION SUBCUTANEOUS WEEKLY
Qty: 3 ML | Refills: 5 | Status: SHIPPED | OUTPATIENT
Start: 2024-11-15

## 2024-11-20 ENCOUNTER — TELEPHONE (OUTPATIENT)
Dept: PRIMARY CARE CLINIC | Age: 44
End: 2024-11-20

## 2024-11-20 NOTE — TELEPHONE ENCOUNTER
Pt asking if we can call her insurance at [phone] to ask them for \"QCD guidelines override,\" because they told her we would need to call them for it, for her Ozempic. It went from $24 last month to $152 this month

## 2024-11-21 NOTE — TELEPHONE ENCOUNTER
Called insurance company, they stated the price went up because she was filling it too early, she would have to call the insurance to give them a reason fort he early fill. Her next fill date is 12/11 and the copay should be lower at that time.

## 2024-12-06 NOTE — TELEPHONE ENCOUNTER
Name of Medication(s) Requested:  Requested Prescriptions     Pending Prescriptions Disp Refills    fluticasone (FLONASE) 50 MCG/ACT nasal spray 1 each 2     Si sprays by Each Nostril route daily       Medication is on current medication list Yes    Dosage and directions were verified? Yes    Quantity verified: 30 day supply     Pharmacy Verified?  Yes    Last Appointment:  2024    Future appts:  Future Appointments   Date Time Provider Department Center   2025 11:30 AM London Hammonds MD [facility]        (If no appt send self scheduling link. .REFILLAPPT)  Scheduling request sent?     [] Yes  [x] No    Does patient need updated?  [] Yes  [x] No

## 2024-12-07 RX ORDER — FLUTICASONE PROPIONATE 50 MCG
2 SPRAY, SUSPENSION (ML) NASAL DAILY
Qty: 1 EACH | Refills: 2 | Status: SHIPPED | OUTPATIENT
Start: 2024-12-07

## 2024-12-19 DIAGNOSIS — E11.9 TYPE 2 DIABETES MELLITUS WITHOUT COMPLICATION, WITHOUT LONG-TERM CURRENT USE OF INSULIN (HCC): ICD-10-CM

## 2024-12-20 RX ORDER — FLUTICASONE PROPIONATE 50 MCG
2 SPRAY, SUSPENSION (ML) NASAL DAILY
Qty: 1 EACH | Refills: 2 | Status: SHIPPED | OUTPATIENT
Start: 2024-12-20

## 2024-12-20 RX ORDER — SEMAGLUTIDE 0.68 MG/ML
0.5 INJECTION, SOLUTION SUBCUTANEOUS WEEKLY
Qty: 3 ML | Refills: 5 | Status: SHIPPED | OUTPATIENT
Start: 2024-12-20

## 2025-01-13 RX ORDER — GLIMEPIRIDE 4 MG/1
4 TABLET ORAL
Qty: 90 TABLET | Refills: 0 | Status: SHIPPED | OUTPATIENT
Start: 2025-01-13

## 2025-02-19 ENCOUNTER — OFFICE VISIT (OUTPATIENT)
Dept: ENDOCRINOLOGY | Age: 45
End: 2025-02-19

## 2025-02-19 VITALS
SYSTOLIC BLOOD PRESSURE: 117 MMHG | DIASTOLIC BLOOD PRESSURE: 79 MMHG | HEART RATE: 83 BPM | BODY MASS INDEX: 30.43 KG/M2 | HEIGHT: 60 IN | RESPIRATION RATE: 18 BRPM | WEIGHT: 155 LBS | OXYGEN SATURATION: 99 %

## 2025-02-19 DIAGNOSIS — L65.9 HAIR LOSS: ICD-10-CM

## 2025-02-19 DIAGNOSIS — E55.9 VITAMIN D DEFICIENCY: ICD-10-CM

## 2025-02-19 DIAGNOSIS — E03.9 HYPOTHYROIDISM, UNSPECIFIED TYPE: Primary | ICD-10-CM

## 2025-02-19 DIAGNOSIS — E11.65 TYPE 2 DIABETES MELLITUS WITH HYPERGLYCEMIA, WITHOUT LONG-TERM CURRENT USE OF INSULIN (HCC): ICD-10-CM

## 2025-02-19 NOTE — PROGRESS NOTES
[facility] Department of Endocrinology Diabetes and Metabolism   [address] [place] [address]   Phone: [phone]  Fax: [phone]    Date of Service: 2/19/2025  Primary Care Physician: Lio Quezada DO  Referring physician: No ref. provider found  Provider: London Hammonds MD        Reason for the visit:  Primary Hypothyroidism, HLD    History of Present Illness:  The history is provided by the patient. No  was used. Accuracy of the patient data is excellent.         Linda John is a very pleasant 44 y.o. female seen today for management of hypothyroidism     The patient was diagnosed with hypothyroidism 2015 and since then has been on thyroid hormones replacement.     The patient is currently on Levothyroxine 100 mcg daily. The patient taking Levothyroxine in the morning on an empty stomach, waiting one hour before eating, avoiding multivitamins containing calcium or iron with it    The patient feels very good on the current dose.  She is due for blood work now    PAST MEDICAL HISTORY   Past Medical History:   Diagnosis Date    Carpal tunnel syndrome, right     Diabetes mellitus (HCC)     GERD (gastroesophageal reflux disease)     Hyperlipidemia     Hypothyroidism        PAST SURGICAL HISTORY   Past Surgical History:   Procedure Laterality Date    CARPAL TUNNEL RELEASE Right 3/18/2020    RIGHT CARPAL TUNNEL RELEASE performed by Toni Farmer MD at [facility] OR     INSERT PICC CATH, 5/> YRS  7/4/2020            SOCIAL HISTORY   Tobacco:   reports that she has never smoked. She has never used smokeless tobacco.  Alcohol:   reports no history of alcohol use.  Drugs:   reports no history of drug use.    FAMILY HISTORY   Family History   Problem Relation Age of Onset    Other Mother         aneurysm    Stroke Father     Heart Attack Father     No Known Problems Sister     Diabetes Brother     Heart Attack Brother 50    No Known Problems Sister

## 2025-02-25 LAB — DIABETIC RETINOPATHY: POSITIVE

## 2025-02-26 ASSESSMENT — PATIENT HEALTH QUESTIONNAIRE - PHQ9
2. FEELING DOWN, DEPRESSED OR HOPELESS: NOT AT ALL
SUM OF ALL RESPONSES TO PHQ9 QUESTIONS 1 & 2: 0
1. LITTLE INTEREST OR PLEASURE IN DOING THINGS: NOT AT ALL
SUM OF ALL RESPONSES TO PHQ QUESTIONS 1-9: 0
1. LITTLE INTEREST OR PLEASURE IN DOING THINGS: NOT AT ALL
SUM OF ALL RESPONSES TO PHQ QUESTIONS 1-9: 0
SUM OF ALL RESPONSES TO PHQ9 QUESTIONS 1 & 2: 0
2. FEELING DOWN, DEPRESSED OR HOPELESS: NOT AT ALL

## 2025-02-27 ENCOUNTER — OFFICE VISIT (OUTPATIENT)
Dept: PRIMARY CARE CLINIC | Age: 45
End: 2025-02-27
Payer: COMMERCIAL

## 2025-02-27 VITALS
HEART RATE: 80 BPM | DIASTOLIC BLOOD PRESSURE: 70 MMHG | RESPIRATION RATE: 16 BRPM | SYSTOLIC BLOOD PRESSURE: 122 MMHG | OXYGEN SATURATION: 99 % | WEIGHT: 145 LBS | BODY MASS INDEX: 28.47 KG/M2 | HEIGHT: 60 IN

## 2025-02-27 DIAGNOSIS — E55.9 VITAMIN D DEFICIENCY: ICD-10-CM

## 2025-02-27 DIAGNOSIS — E11.65 TYPE 2 DIABETES MELLITUS WITH HYPERGLYCEMIA, WITHOUT LONG-TERM CURRENT USE OF INSULIN (HCC): ICD-10-CM

## 2025-02-27 DIAGNOSIS — Z12.11 SCREENING FOR MALIGNANT NEOPLASM OF COLON: ICD-10-CM

## 2025-02-27 DIAGNOSIS — Z00.01 ENCOUNTER FOR WELL ADULT EXAM WITH ABNORMAL FINDINGS: Primary | ICD-10-CM

## 2025-02-27 DIAGNOSIS — E78.2 MIXED HYPERLIPIDEMIA: ICD-10-CM

## 2025-02-27 DIAGNOSIS — Z12.31 ENCOUNTER FOR SCREENING MAMMOGRAM FOR BREAST CANCER: ICD-10-CM

## 2025-02-27 PROCEDURE — 99396 PREV VISIT EST AGE 40-64: CPT | Performed by: FAMILY MEDICINE

## 2025-02-27 SDOH — ECONOMIC STABILITY: FOOD INSECURITY: WITHIN THE PAST 12 MONTHS, YOU WORRIED THAT YOUR FOOD WOULD RUN OUT BEFORE YOU GOT MONEY TO BUY MORE.: NEVER TRUE

## 2025-02-27 SDOH — ECONOMIC STABILITY: FOOD INSECURITY: WITHIN THE PAST 12 MONTHS, THE FOOD YOU BOUGHT JUST DIDN'T LAST AND YOU DIDN'T HAVE MONEY TO GET MORE.: NEVER TRUE

## 2025-02-27 ASSESSMENT — ENCOUNTER SYMPTOMS
EYE REDNESS: 0
DIARRHEA: 0
BLOOD IN STOOL: 0
VOMITING: 0
COUGH: 0
NAUSEA: 0
CONSTIPATION: 0
CHEST TIGHTNESS: 0
EYE PAIN: 0
SINUS PRESSURE: 0
COLOR CHANGE: 0
ABDOMINAL PAIN: 0
RHINORRHEA: 0
SHORTNESS OF BREATH: 0
APNEA: 0
WHEEZING: 0
BACK PAIN: 0
SORE THROAT: 0
EYE ITCHING: 0

## 2025-02-28 DIAGNOSIS — E11.65 TYPE 2 DIABETES MELLITUS WITH HYPERGLYCEMIA, WITHOUT LONG-TERM CURRENT USE OF INSULIN (HCC): ICD-10-CM

## 2025-02-28 DIAGNOSIS — E55.9 VITAMIN D DEFICIENCY: ICD-10-CM

## 2025-02-28 DIAGNOSIS — Z00.01 ENCOUNTER FOR WELL ADULT EXAM WITH ABNORMAL FINDINGS: ICD-10-CM

## 2025-02-28 DIAGNOSIS — E78.2 MIXED HYPERLIPIDEMIA: ICD-10-CM

## 2025-02-28 DIAGNOSIS — L65.9 HAIR LOSS: ICD-10-CM

## 2025-02-28 DIAGNOSIS — E03.9 HYPOTHYROIDISM, UNSPECIFIED TYPE: ICD-10-CM

## 2025-02-28 LAB
CREATININE URINE: 57.7 MG/DL (ref 29–226)
HCT VFR BLD CALC: 42.4 % (ref 34–48)
HEMOGLOBIN: 13.6 G/DL (ref 11.5–15.5)
MCH RBC QN AUTO: 29.2 PG (ref 26–35)
MCHC RBC AUTO-ENTMCNC: 32.1 G/DL (ref 32–34.5)
MCV RBC AUTO: 91 FL (ref 80–99.9)
MICROALBUMIN/CREAT 24H UR: 37 MG/L (ref 0–19)
MICROALBUMIN/CREAT UR-RTO: 64 MCG/MG CREAT (ref 0–30)
PDW BLD-RTO: 12.8 % (ref 11.5–15)
PLATELET # BLD: 216 K/UL (ref 130–450)
PMV BLD AUTO: 11.5 FL (ref 7–12)
RBC # BLD: 4.66 M/UL (ref 3.5–5.5)
WBC # BLD: 6.9 K/UL (ref 4.5–11.5)

## 2025-03-01 LAB
ALBUMIN: 4.2 G/DL (ref 3.5–5.2)
ALBUMIN: 4.2 G/DL (ref 3.5–5.2)
ALP BLD-CCNC: 96 U/L (ref 35–104)
ALP BLD-CCNC: 96 U/L (ref 35–104)
ALT SERPL-CCNC: 27 U/L (ref 0–32)
ALT SERPL-CCNC: 28 U/L (ref 0–32)
ANION GAP SERPL CALCULATED.3IONS-SCNC: 18 MMOL/L (ref 7–16)
ANION GAP SERPL CALCULATED.3IONS-SCNC: 20 MMOL/L (ref 7–16)
AST SERPL-CCNC: 21 U/L (ref 0–31)
AST SERPL-CCNC: 21 U/L (ref 0–31)
BILIRUB SERPL-MCNC: 0.4 MG/DL (ref 0–1.2)
BILIRUB SERPL-MCNC: 0.4 MG/DL (ref 0–1.2)
BUN BLDV-MCNC: 16 MG/DL (ref 6–20)
BUN BLDV-MCNC: 16 MG/DL (ref 6–20)
CALCIUM SERPL-MCNC: 9.8 MG/DL (ref 8.6–10.2)
CALCIUM SERPL-MCNC: 9.9 MG/DL (ref 8.6–10.2)
CHLORIDE BLD-SCNC: 102 MMOL/L (ref 98–107)
CHLORIDE BLD-SCNC: 102 MMOL/L (ref 98–107)
CHOLESTEROL, TOTAL: 288 MG/DL
CHOLESTEROL, TOTAL: 292 MG/DL
CO2: 19 MMOL/L (ref 22–29)
CO2: 20 MMOL/L (ref 22–29)
CREAT SERPL-MCNC: 0.7 MG/DL (ref 0.5–1)
CREAT SERPL-MCNC: 0.7 MG/DL (ref 0.5–1)
FERRITIN: 656 NG/ML
GFR, ESTIMATED: >90 ML/MIN/1.73M2
GFR, ESTIMATED: >90 ML/MIN/1.73M2
GLUCOSE BLD-MCNC: 139 MG/DL (ref 74–99)
GLUCOSE BLD-MCNC: 143 MG/DL (ref 74–99)
HBA1C MFR BLD: 8.9 % (ref 4–5.6)
HBA1C MFR BLD: 8.9 % (ref 4–5.6)
HDLC SERPL-MCNC: 44 MG/DL
HDLC SERPL-MCNC: 44 MG/DL
IRON % SATURATION: 30 % (ref 15–50)
IRON: 88 UG/DL (ref 37–145)
LDL CHOLESTEROL: 204 MG/DL
LDL CHOLESTEROL: 209 MG/DL
POTASSIUM SERPL-SCNC: 4.4 MMOL/L (ref 3.5–5)
POTASSIUM SERPL-SCNC: 4.4 MMOL/L (ref 3.5–5)
SODIUM BLD-SCNC: 140 MMOL/L (ref 132–146)
SODIUM BLD-SCNC: 141 MMOL/L (ref 132–146)
T4 FREE: 1.8 NG/DL (ref 0.9–1.7)
TOTAL IRON BINDING CAPACITY: 296 UG/DL (ref 250–450)
TOTAL PROTEIN: 7.9 G/DL (ref 6.4–8.3)
TOTAL PROTEIN: 7.9 G/DL (ref 6.4–8.3)
TRIGL SERPL-MCNC: 195 MG/DL
TRIGL SERPL-MCNC: 198 MG/DL
TSH SERPL DL<=0.05 MIU/L-ACNC: 0.83 UIU/ML (ref 0.27–4.2)
TSH SERPL DL<=0.05 MIU/L-ACNC: 0.88 UIU/ML (ref 0.27–4.2)
VITAMIN D 25-HYDROXY: 17.7 NG/ML (ref 30–100)
VITAMIN D 25-HYDROXY: 17.8 NG/ML (ref 30–100)
VLDLC SERPL CALC-MCNC: 39 MG/DL
VLDLC SERPL CALC-MCNC: 40 MG/DL

## 2025-03-01 RX ORDER — ERGOCALCIFEROL 1.25 MG/1
50000 CAPSULE, LIQUID FILLED ORAL WEEKLY
Qty: 12 CAPSULE | Refills: 1 | Status: SHIPPED | OUTPATIENT
Start: 2025-03-01

## 2025-03-06 ENCOUNTER — PATIENT MESSAGE (OUTPATIENT)
Dept: ENDOCRINOLOGY | Age: 45
End: 2025-03-06

## 2025-03-06 ENCOUNTER — TELEPHONE (OUTPATIENT)
Dept: ENDOCRINOLOGY | Age: 45
End: 2025-03-06

## 2025-03-06 DIAGNOSIS — E11.65 TYPE 2 DIABETES MELLITUS WITH HYPERGLYCEMIA, WITHOUT LONG-TERM CURRENT USE OF INSULIN (HCC): ICD-10-CM

## 2025-03-10 NOTE — TELEPHONE ENCOUNTER
Hi doctor Pilar told me to contact your office about my A1C result. I need new medication or to more dosage to my Ozempic.      Thank you!

## 2025-03-13 DIAGNOSIS — E11.65 TYPE 2 DIABETES MELLITUS WITH HYPERGLYCEMIA, WITHOUT LONG-TERM CURRENT USE OF INSULIN (HCC): Primary | ICD-10-CM

## 2025-03-13 RX ORDER — GLIMEPIRIDE 4 MG/1
4 TABLET ORAL
Qty: 90 TABLET | Refills: 0 | Status: SHIPPED | OUTPATIENT
Start: 2025-03-13

## 2025-03-18 DIAGNOSIS — E11.65 TYPE 2 DIABETES MELLITUS WITH HYPERGLYCEMIA, WITHOUT LONG-TERM CURRENT USE OF INSULIN (HCC): ICD-10-CM

## 2025-04-04 ENCOUNTER — TELEPHONE (OUTPATIENT)
Dept: SURGERY | Age: 45
End: 2025-04-04

## 2025-04-04 NOTE — TELEPHONE ENCOUNTER
Patient has been left two messages to reschedule her April 21st appointment and she has not responded.    Anesthesia Type: 1% lidocaine with epinephrine Size Of Lesion In Cm: 0.7 Additional Anesthesia Volume In Cc (Will Not Render If 0): 0 Electrodesiccation Text: The wound bed was treated with electrodesiccation after the biopsy was performed. Notification Instructions: Patient will be notified of biopsy results. However, patient instructed to call the office if not contacted within 2 weeks. Bill For Surgical Tray: no Depth Of Biopsy: dermis Lab Facility: [facility] Wound Care: Vaseline Consent: The provider's intent is to obtain a tissue sample soley for diagnostic purposes. Written consent was obtained and risks were reviewed including but not limited to scarring, infection, bleeding, scabbing, incomplete removal, nerve damage and allergy to anesthesia. Biopsy Type: H and E Anesthesia Volume In Cc (Will Not Render If 0): 0.5 Type Of Destruction Used: Curettage Electrodesiccation And Curettage Text: The wound bed was treated with electrodesiccation and curettage after the biopsy was performed. Silver Nitrate Text: The wound bed was treated with silver nitrate after the biopsy was performed. Body Location Override (Optional - Billing Will Still Be Based On Selected Body Map Location If Applicable): left upper back Dressing: bandage Biopsy Method: 15 blade Hemostasis: Mamadou's Was A Bandage Applied: Yes Detail Level: Detailed Billing Type: [employer] Lab: [facility] Curettage Text: The wound bed was treated with curettage after the biopsy was performed. Post-Care Instructions: I reviewed with the patient in detail post-care instructions. Patient is to keep the biopsy site dry overnight, and then apply bacitracin twice daily until healed. Patient may apply hydrogen peroxide soaks to remove any crusting. Cryotherapy Text: The wound bed was treated with cryotherapy after the biopsy was performed.

## 2025-04-14 ENCOUNTER — OFFICE VISIT (OUTPATIENT)
Dept: FAMILY MEDICINE CLINIC | Age: 45
End: 2025-04-14

## 2025-04-14 VITALS
DIASTOLIC BLOOD PRESSURE: 82 MMHG | OXYGEN SATURATION: 99 % | HEIGHT: 60 IN | BODY MASS INDEX: 31.02 KG/M2 | TEMPERATURE: 97.6 F | HEART RATE: 88 BPM | WEIGHT: 158 LBS | SYSTOLIC BLOOD PRESSURE: 120 MMHG

## 2025-04-14 DIAGNOSIS — J02.9 SORE THROAT: ICD-10-CM

## 2025-04-14 DIAGNOSIS — R09.82 POSTNASAL DRIP: ICD-10-CM

## 2025-04-14 DIAGNOSIS — J01.90 ACUTE NON-RECURRENT SINUSITIS, UNSPECIFIED LOCATION: Primary | ICD-10-CM

## 2025-04-14 DIAGNOSIS — R51.9 SINUS HEADACHE: ICD-10-CM

## 2025-04-14 DIAGNOSIS — H10.9 CONJUNCTIVITIS OF LEFT EYE, UNSPECIFIED CONJUNCTIVITIS TYPE: ICD-10-CM

## 2025-04-14 LAB
INFLUENZA A ANTIBODY: NEGATIVE
INFLUENZA B ANTIBODY: NEGATIVE
Lab: NORMAL
PERFORMING INSTRUMENT: NORMAL
QC PASS/FAIL: NORMAL
S PYO AG THROAT QL: NORMAL
SARS-COV-2, POC: NORMAL

## 2025-04-14 RX ORDER — METHYLPREDNISOLONE 4 MG/1
TABLET ORAL
Qty: 1 KIT | Refills: 0 | Status: SHIPPED | OUTPATIENT
Start: 2025-04-14

## 2025-04-14 RX ORDER — HYDROXYZINE HYDROCHLORIDE 10 MG/5ML
4 SYRUP ORAL EVERY 6 HOURS PRN
Qty: 20 TABLET | Refills: 0 | Status: SHIPPED | OUTPATIENT
Start: 2025-04-14

## 2025-04-14 RX ORDER — TOBRAMYCIN 3 MG/ML
1 SOLUTION/ DROPS OPHTHALMIC EVERY 6 HOURS
Qty: 5 ML | Refills: 0 | Status: SHIPPED | OUTPATIENT
Start: 2025-04-14 | End: 2025-04-24

## 2025-04-14 NOTE — PROGRESS NOTES
Other Mother         aneurysm    Stroke Father     Heart Attack Father     No Known Problems Sister     Diabetes Brother     Heart Attack Brother 50    No Known Problems Sister        Medications:     Current Outpatient Medications:     methylPREDNISolone (MEDROL DOSEPACK) 4 MG tablet, Take by mouth., Disp: 1 kit, Rfl: 0    amoxicillin-clavulanate (AUGMENTIN) 875-125 MG per tablet, Take 1 tablet by mouth 2 times daily for 10 days, Disp: 20 tablet, Rfl: 0    chlorpheniramine (ALLER-CHLOR) 4 MG tablet, Take 1 tablet by mouth every 6 hours as needed for Allergies, Disp: 20 tablet, Rfl: 0    tobramycin (TOBREX) 0.3 % ophthalmic solution, Place 1 drop into both eyes every 6 hours for 10 days, Disp: 5 mL, Rfl: 0    semaglutide, 2 MG/DOSE, (OZEMPIC) 8 MG/3ML SOPN sc injection, Inject 2 mg into the skin every 7 days, Disp: 3 mL, Rfl: 3    glimepiride (AMARYL) 4 MG tablet, Take 1 tablet by mouth every morning (before breakfast), Disp: 90 tablet, Rfl: 0    vitamin D (ERGOCALCIFEROL) 1.25 MG (82199 UT) CAPS capsule, Take 1 capsule by mouth once a week, Disp: 12 capsule, Rfl: 1    fluticasone (FLONASE) 50 MCG/ACT nasal spray, 2 sprays by Each Nostril route daily, Disp: 1 each, Rfl: 2    atorvastatin (LIPITOR) 40 MG tablet, Take 1 tablet by mouth daily, Disp: 90 tablet, Rfl: 1    levothyroxine (SYNTHROID) 100 MCG tablet, Take 1 tablet by mouth daily, Disp: 30 tablet, Rfl: 11    AMS VariCodeCAN FINEPOINT LANCETS MISC, Test TID with meals, Disp: 100 each, Rfl: 1    blood glucose test strips (ONETOUCH ULTRA) strip, 1 each by In Vitro route daily As needed., Disp: 100 each, Rfl: 3    Blood Glucose Monitoring Suppl (ONE TOUCH ULTRA 2) w/Device KIT, 1 kit by Does not apply route daily, Disp: 1 kit, Rfl: 0    Allergies:     Allergies   Allergen Reactions    Iodine Anaphylaxis    Shellfish-Derived Products Anaphylaxis, Hives, Itching and Rash       Social History:     Social History     Tobacco Use    Smoking status: Never    Smokeless

## 2025-04-29 ENCOUNTER — HOSPITAL ENCOUNTER (OUTPATIENT)
Dept: MAMMOGRAPHY | Age: 45
Discharge: HOME | End: 2025-05-01
Attending: FAMILY MEDICINE
Payer: COMMERCIAL

## 2025-04-29 VITALS — BODY MASS INDEX: 29.27 KG/M2 | WEIGHT: 155 LBS | HEIGHT: 61 IN

## 2025-04-29 DIAGNOSIS — Z12.31 ENCOUNTER FOR SCREENING MAMMOGRAM FOR BREAST CANCER: ICD-10-CM

## 2025-04-29 PROCEDURE — 77063 BREAST TOMOSYNTHESIS BI: CPT

## 2025-04-30 ENCOUNTER — RESULTS FOLLOW-UP (OUTPATIENT)
Dept: PRIMARY CARE CLINIC | Age: 45
End: 2025-04-30

## 2025-05-16 DIAGNOSIS — E03.9 HYPOTHYROIDISM, UNSPECIFIED TYPE: Primary | ICD-10-CM

## 2025-05-16 DIAGNOSIS — E11.65 TYPE 2 DIABETES MELLITUS WITH HYPERGLYCEMIA, WITHOUT LONG-TERM CURRENT USE OF INSULIN (HCC): Primary | ICD-10-CM

## 2025-05-16 RX ORDER — GLIMEPIRIDE 4 MG/1
4 TABLET ORAL
Qty: 90 TABLET | Refills: 3 | Status: SHIPPED | OUTPATIENT
Start: 2025-05-16

## 2025-05-16 RX ORDER — ATORVASTATIN CALCIUM 40 MG/1
40 TABLET, FILM COATED ORAL DAILY
Qty: 90 TABLET | Refills: 1 | Status: SHIPPED | OUTPATIENT
Start: 2025-05-16

## 2025-05-16 RX ORDER — LEVOTHYROXINE SODIUM 100 UG/1
100 TABLET ORAL DAILY
Qty: 90 TABLET | Refills: 3 | Status: SHIPPED | OUTPATIENT
Start: 2025-05-16

## 2025-05-23 DIAGNOSIS — E11.65 TYPE 2 DIABETES MELLITUS WITH HYPERGLYCEMIA, WITHOUT LONG-TERM CURRENT USE OF INSULIN (HCC): ICD-10-CM

## 2025-05-23 RX ORDER — GLIMEPIRIDE 4 MG/1
4 TABLET ORAL
Qty: 90 TABLET | Refills: 3 | OUTPATIENT
Start: 2025-05-23

## 2025-05-28 ENCOUNTER — RESULTS FOLLOW-UP (OUTPATIENT)
Dept: FAMILY MEDICINE CLINIC | Age: 45
End: 2025-05-28

## 2025-05-28 ENCOUNTER — OFFICE VISIT (OUTPATIENT)
Dept: FAMILY MEDICINE CLINIC | Age: 45
End: 2025-05-28
Payer: COMMERCIAL

## 2025-05-28 VITALS
BODY MASS INDEX: 29.27 KG/M2 | TEMPERATURE: 96.9 F | SYSTOLIC BLOOD PRESSURE: 112 MMHG | OXYGEN SATURATION: 94 % | WEIGHT: 155 LBS | HEART RATE: 85 BPM | RESPIRATION RATE: 16 BRPM | HEIGHT: 61 IN | DIASTOLIC BLOOD PRESSURE: 72 MMHG

## 2025-05-28 DIAGNOSIS — R10.9 FLANK PAIN: Primary | ICD-10-CM

## 2025-05-28 DIAGNOSIS — J01.40 ACUTE NON-RECURRENT PANSINUSITIS: ICD-10-CM

## 2025-05-28 DIAGNOSIS — N39.0 URINARY TRACT INFECTION WITHOUT HEMATURIA, SITE UNSPECIFIED: ICD-10-CM

## 2025-05-28 DIAGNOSIS — R10.30 LOWER ABDOMINAL PAIN: ICD-10-CM

## 2025-05-28 LAB
BILIRUBIN, POC: NORMAL
BLOOD URINE, POC: NORMAL
CLARITY, POC: CLEAR
COLOR, POC: YELLOW
CONTROL: NORMAL
GLUCOSE URINE, POC: NORMAL MG/DL
KETONES, POC: NORMAL MG/DL
LEUKOCYTE EST, POC: NORMAL
NITRITE, POC: NORMAL
PH, POC: 5.5
PREGNANCY TEST URINE, POC: NEGATIVE
PROTEIN, POC: >=300 MG/DL
SPECIFIC GRAVITY, POC: >=1.03
UROBILINOGEN, POC: 0.2 MG/DL

## 2025-05-28 PROCEDURE — 81002 URINALYSIS NONAUTO W/O SCOPE: CPT | Performed by: FAMILY MEDICINE

## 2025-05-28 PROCEDURE — 99214 OFFICE O/P EST MOD 30 MIN: CPT | Performed by: FAMILY MEDICINE

## 2025-05-28 PROCEDURE — 81025 URINE PREGNANCY TEST: CPT | Performed by: FAMILY MEDICINE

## 2025-05-28 RX ORDER — CEFDINIR 300 MG/1
300 CAPSULE ORAL 2 TIMES DAILY
Qty: 20 CAPSULE | Refills: 0 | Status: SHIPPED | OUTPATIENT
Start: 2025-05-28 | End: 2025-06-07

## 2025-05-28 ASSESSMENT — ENCOUNTER SYMPTOMS
EYE DISCHARGE: 0
SHORTNESS OF BREATH: 0
PHOTOPHOBIA: 0
BACK PAIN: 0
ALLERGIC/IMMUNOLOGIC NEGATIVE: 1
TROUBLE SWALLOWING: 0
COUGH: 0
EYE PAIN: 0
EYE REDNESS: 0
SINUS PAIN: 0
BLOOD IN STOOL: 0
DIARRHEA: 1
NAUSEA: 1
VOMITING: 1
SORE THROAT: 0
ABDOMINAL PAIN: 1
CHEST TIGHTNESS: 0

## 2025-05-28 NOTE — PROGRESS NOTES
25  Linda Roth Patalinghug : 1980 Sex: female  Age: 44 y.o.      Assessment and Plan:  Linda Roth was seen today for fever, diarrhea and abdominal pain.    Diagnoses and all orders for this visit:    Flank pain  -     POCT Urinalysis no Micro  -     Culture, Urine; Future  -     Culture, Urine    Lower abdominal pain  -     XR ABDOMEN (KUB) (SINGLE AP VIEW); Future    Acute non-recurrent pansinusitis  -     cefdinir (OMNICEF) 300 MG capsule; Take 1 capsule by mouth 2 times daily for 10 days    Urinary tract infection without hematuria, site unspecified  -     cefdinir (OMNICEF) 300 MG capsule; Take 1 capsule by mouth 2 times daily for 10 days    Urinalysis shows RBCs and WBCs, culture was obtained.  Urine pregnancy test was negative.  Abdominal x-ray with was nonspecific according to my reading, final disposition per radiology report.  Will go ahead and treat her urinary symptoms and her sinusitis with cefdinir 300 mg twice daily for 10 days.  Symptomatic treatment can include Tylenol, fluids, rest, Mucinex, Claritin, coolmist.  She should follow-up in 2 weeks to reassess right cervical node.  Also assure clearance of her urinary tract infection.  She sees a surgeon tomorrow for colonoscopy.  If complaints worsen in any way, recheck the office immediately.    Return in about 2 weeks (around 2025).    Chief Complaint   Patient presents with    Fever     Last week on Monday on and off HO bowel \"leak\"     Diarrhea     Started last week     Abdominal Pain     Lower abdomen/ back        The patient states that they have had abdominal pain for the last 10 days.  The pain is described as cramping and is located in the lower abdomen.  The patient admits nausea occasional vomiting.  Bowel movements have been diarrhea.  No black or bloody stools.  The patient denies dysuria, urinary frequency, urgency and or gross hematuria.  No flank pain.  No fevers or chills.  No chest pain or dyspnea.

## 2025-05-28 NOTE — PROGRESS NOTES
25  Linda Roth Patalinghug : 1980 Sex: female  Age: 44 y.o.      Assessment and Plan:  Linda Roth was seen today for fever, diarrhea and abdominal pain.    Diagnoses and all orders for this visit:    Flank pain  -     POCT Urinalysis no Micro  -     Culture, Urine; Future        No follow-ups on file.    Chief Complaint   Patient presents with   • Fever     Last week on Monday on and off HO bowel \"leak\"    • Diarrhea     Started last week    • Abdominal Pain     Lower abdomen/ back        HPI    Review of Systems      Current Outpatient Medications:   •  levothyroxine (SYNTHROID) 100 MCG tablet, Take 1 tablet by mouth daily, Disp: 90 tablet, Rfl: 3  •  glimepiride (AMARYL) 4 MG tablet, Take 1 tablet by mouth every morning (before breakfast), Disp: 90 tablet, Rfl: 3  •  atorvastatin (LIPITOR) 40 MG tablet, Take 1 tablet by mouth daily, Disp: 90 tablet, Rfl: 1  •  chlorpheniramine (ALLER-CHLOR) 4 MG tablet, Take 1 tablet by mouth every 6 hours as needed for Allergies, Disp: 20 tablet, Rfl: 0  •  semaglutide, 2 MG/DOSE, (OZEMPIC) 8 MG/3ML SOPN sc injection, Inject 2 mg into the skin every 7 days, Disp: 3 mL, Rfl: 3  •  vitamin D (ERGOCALCIFEROL) 1.25 MG (41830 UT) CAPS capsule, Take 1 capsule by mouth once a week, Disp: 12 capsule, Rfl: 1  •  fluticasone (FLONASE) 50 MCG/ACT nasal spray, 2 sprays by Each Nostril route daily, Disp: 1 each, Rfl: 2  •  BusapCAN FINEPOINT LANCETS MISC, Test TID with meals, Disp: 100 each, Rfl: 1  •  blood glucose test strips (ONETOUCH ULTRA) strip, 1 each by In Vitro route daily As needed., Disp: 100 each, Rfl: 3  •  Blood Glucose Monitoring Suppl (ONE TOUCH ULTRA 2) w/Device KIT, 1 kit by Does not apply route daily, Disp: 1 kit, Rfl: 0  •  methylPREDNISolone (MEDROL DOSEPACK) 4 MG tablet, Take by mouth. (Patient not taking: Reported on 2025), Disp: 1 kit, Rfl: 0  Allergies   Allergen Reactions   • Iodine Anaphylaxis   • Shellfish-Derived Products Anaphylaxis, Hives,

## 2025-05-29 ENCOUNTER — OFFICE VISIT (OUTPATIENT)
Dept: SURGERY | Age: 45
End: 2025-05-29
Payer: COMMERCIAL

## 2025-05-29 VITALS
DIASTOLIC BLOOD PRESSURE: 84 MMHG | OXYGEN SATURATION: 96 % | WEIGHT: 157.2 LBS | TEMPERATURE: 97.3 F | HEIGHT: 61 IN | BODY MASS INDEX: 29.68 KG/M2 | RESPIRATION RATE: 18 BRPM | SYSTOLIC BLOOD PRESSURE: 120 MMHG | HEART RATE: 90 BPM

## 2025-05-29 DIAGNOSIS — R10.11 RUQ PAIN: ICD-10-CM

## 2025-05-29 DIAGNOSIS — Z12.11 SCREEN FOR COLON CANCER: ICD-10-CM

## 2025-05-29 DIAGNOSIS — Z12.11 ENCOUNTER FOR SCREENING COLONOSCOPY: Primary | ICD-10-CM

## 2025-05-29 PROCEDURE — 99203 OFFICE O/P NEW LOW 30 MIN: CPT | Performed by: SURGERY

## 2025-05-29 RX ORDER — SODIUM CHLORIDE 9 MG/ML
INJECTION, SOLUTION INTRAVENOUS CONTINUOUS
OUTPATIENT
Start: 2025-05-29

## 2025-05-29 NOTE — PATIENT INSTRUCTIONS
General Surgery - Dr. Chanel Wong MD, FACS    Preoperative Instructions    Please read the following information very carefully. It contains information that is necessary to best prepare you for your upcoming procedure.    Make arrangements for a  to take you to and from your procedure. YOU MUST HAVE SOMEONE DRIVE YOU HOME - this cannot be a taxi or public transportation. You will not be administered anesthesia without someone to go home and be at home with you that day.  Nothing to eat or drink after midnight the night before your procedure.  Follow your bowel prep instructions if you have them for this procedure.    3 days prior to your procedure: Stop taking blood thinners like Coumadin or Plavix or Xarelto.  5 days prior to your procedure: Stop taking Aspirin or Aspirin containing products.   If you cannot stop any of these medications prior to your procedure, please contact our office.    Medications morning of procedure:  Only heart, breathing, blood pressure, and seizure medications are permitted on the morning of your procedure. These medications can be taken with a sip of water.    IF YOU ARE UNABLE TO KEEP THE ABOVE SCHEDULED PROCEDURE, YOU MUST NOTIFY DR. WONG'S OFFICE [phone]. NOT THE FACILITY.    NO CHEWING GUM OR CHEWING TOBACCO AFTER MIDNIGHT ON DAY OF PROCEDURE.    YOU MUST HAVE TRANSPORTATION TO AND FROM THE FACILITY.         Colonoscopy: Before Your Procedure  What is a colonoscopy?     A colonoscopy is a test that lets a doctor look inside your colon. The doctor uses a thin, lighted tube called a colonoscope to look for problems. These include small growths called polyps, cancer, or bleeding.  During the test, the doctor can take samples of tissue that can be checked for cancer or other problems. This is called a biopsy. The doctor can also take out polyps.  Before the test, you will need to stop eating solid foods. You also will be given instructions on how to clean

## 2025-05-29 NOTE — PROGRESS NOTES
General Surgery History and Physical    Patient's Name/Date of Birth: Linda John / 1980    Date: 5/29/2025    PCP: Lio Quezada DO    Referring Physician:   Lio Quezada DO  [phone]    CHIEF COMPLAINT:    Chief Complaint   Patient presents with    New Patient     NEW PATIENT - Screening for malignant neoplasm of colon    Pt states no issues, just time to get one         HISTORY OF PRESENT ILLNESS:    Linda John is an 44 y.o. female who presents for a colonoscopy. The patient denies any symptoms. No nausea, vomiting, diarrhea. She has constipation as well, though she said this is new. No changes in stool caliber. No bloody or black stools. No unintentional weight loss. No family history of colon cancer. The patient has a known history of: no known risk factors. The patient has never had a colonoscopy before.    The patient has a history of a liver abscess in 2020. It was drained. A CT done at this time in 2020 showed gallstones. She said recently she started having problems again. Last week she started having RUQ pain shooting to her back. She has nausea as well. She is having trouble sleeping because of the pain. She has diarrhea as well. She had an xray of her abdomen yesterday that showed constipation. Eating makes her pain worse. She is bloated.     Past Medical History:   Past Medical History:   Diagnosis Date    Carpal tunnel syndrome, right     Diabetes mellitus (HCC)     GERD (gastroesophageal reflux disease)     Hyperlipidemia     Hypothyroidism         Past Surgical History:   Past Surgical History:   Procedure Laterality Date    CARPAL TUNNEL RELEASE Right 3/18/2020    RIGHT CARPAL TUNNEL RELEASE performed by Toni Farmer MD at [facility] OR     INSERT PICC CATH, 5/> YRS  7/4/2020             Allergies: Iodine and Shellfish-derived products     Medications:   Current Outpatient Medications   Medication Sig Dispense Refill    cefdinir (OMNICEF) 300 MG capsule Take

## 2025-05-30 ENCOUNTER — OFFICE VISIT (OUTPATIENT)
Dept: FAMILY MEDICINE CLINIC | Age: 45
End: 2025-05-30

## 2025-05-30 ENCOUNTER — HOSPITAL ENCOUNTER (OUTPATIENT)
Dept: ULTRASOUND IMAGING | Age: 45
Discharge: HOME | End: 2025-05-30
Attending: SURGERY
Payer: COMMERCIAL

## 2025-05-30 VITALS
OXYGEN SATURATION: 99 % | TEMPERATURE: 97 F | BODY MASS INDEX: 29.64 KG/M2 | HEART RATE: 85 BPM | SYSTOLIC BLOOD PRESSURE: 122 MMHG | HEIGHT: 60 IN | WEIGHT: 151 LBS | DIASTOLIC BLOOD PRESSURE: 84 MMHG

## 2025-05-30 DIAGNOSIS — R10.9 FLANK PAIN: ICD-10-CM

## 2025-05-30 DIAGNOSIS — T78.40XA ALLERGIC REACTION TO DRUG, INITIAL ENCOUNTER: ICD-10-CM

## 2025-05-30 DIAGNOSIS — R10.11 RUQ PAIN: ICD-10-CM

## 2025-05-30 DIAGNOSIS — R10.9 FLANK PAIN: Primary | ICD-10-CM

## 2025-05-30 LAB
ALBUMIN: 3.6 G/DL (ref 3.5–5.2)
ALP BLD-CCNC: 78 U/L (ref 35–104)
ALT SERPL-CCNC: 25 U/L (ref 0–32)
ANION GAP SERPL CALCULATED.3IONS-SCNC: 14 MMOL/L (ref 7–16)
AST SERPL-CCNC: 28 U/L (ref 0–31)
BASOPHILS ABSOLUTE: 0 K/UL (ref 0–0.2)
BASOPHILS RELATIVE PERCENT: 0 % (ref 0–2)
BILIRUB SERPL-MCNC: 0.4 MG/DL (ref 0–1.2)
BLASTS ABSOLUTE COUNT: 0.13 K/UL
BLASTS: 3 %
BUN BLDV-MCNC: 12 MG/DL (ref 6–20)
CALCIUM SERPL-MCNC: 8.6 MG/DL (ref 8.6–10.2)
CHLORIDE BLD-SCNC: 106 MMOL/L (ref 98–107)
CO2: 17 MMOL/L (ref 22–29)
CREAT SERPL-MCNC: 0.7 MG/DL (ref 0.5–1)
CULTURE: NORMAL
CULTURE: NORMAL
EOSINOPHILS ABSOLUTE: 0.26 K/UL (ref 0.05–0.5)
EOSINOPHILS RELATIVE PERCENT: 5 % (ref 0–6)
GFR, ESTIMATED: >90 ML/MIN/1.73M2
GLUCOSE BLD-MCNC: 150 MG/DL (ref 74–99)
HCT VFR BLD CALC: 33.8 % (ref 34–48)
HEMOGLOBIN: 11.4 G/DL (ref 11.5–15.5)
LYMPHOCYTES ABSOLUTE: 1.79 K/UL (ref 1.5–4)
LYMPHOCYTES RELATIVE PERCENT: 36 % (ref 20–42)
MCH RBC QN AUTO: 29.6 PG (ref 26–35)
MCHC RBC AUTO-ENTMCNC: 33.7 G/DL (ref 32–34.5)
MCV RBC AUTO: 87.8 FL (ref 80–99.9)
METAMYELOCYTES ABSOLUTE COUNT: 0.04 K/UL (ref 0–0.12)
METAMYELOCYTES: 1 % (ref 0–1)
MONOCYTES ABSOLUTE: 0.51 K/UL (ref 0.1–0.95)
MONOCYTES RELATIVE PERCENT: 10 % (ref 2–12)
NEUTROPHILS ABSOLUTE: 2.26 K/UL (ref 1.8–7.3)
NEUTROPHILS RELATIVE PERCENT: 45 % (ref 43–80)
PDW BLD-RTO: 13.2 % (ref 11.5–15)
PLATELET CONFIRMATION: NORMAL
PLATELET, FLUORESCENCE: 111 K/UL (ref 130–450)
PMV BLD AUTO: 12.5 FL (ref 7–12)
POTASSIUM SERPL-SCNC: 4.2 MMOL/L (ref 3.5–5)
RBC # BLD: 3.85 M/UL (ref 3.5–5.5)
RBC # BLD: NORMAL 10*6/UL
SODIUM BLD-SCNC: 137 MMOL/L (ref 132–146)
SPECIMEN DESCRIPTION: NORMAL
TOTAL PROTEIN: 6.8 G/DL (ref 6.4–8.3)
WBC # BLD: 5 K/UL (ref 4.5–11.5)

## 2025-05-30 PROCEDURE — 76705 ECHO EXAM OF ABDOMEN: CPT

## 2025-05-30 RX ORDER — PREDNISONE 10 MG/1
TABLET ORAL
Qty: 18 TABLET | Refills: 0 | Status: SHIPPED | OUTPATIENT
Start: 2025-05-30

## 2025-05-30 NOTE — PROGRESS NOTES
distress or discomfort.  The patient has been seen and evaluated.  The patient does not appear to be toxic or lethargic.     The patient will be sent for baseline labs.  The patient had concerns that it may be something to do with her kidneys and labs were not ordered although the patient did have a KUB, ultrasound and urinalysis    Urinalysis negative for UTI    The patient will discontinue the cefdinir    The patient will be started on prednisone.    The patient is to take Benadryl at home.  The patient is to monitor for worsening signs or symptoms.  There is no signs of any anaphylaxis or angioedema.    The patient is to return to express care or go directly to the emergency department should any of the signs or symptoms worsen. The patient is to followup with primary care physician in 2-3 days for repeat evaluation. The patient has no other questions or concerns at this time the patient will be discharged home.      Clinical Impression:   Linda Roth was seen today for swelling.    Diagnoses and all orders for this visit:    Flank pain  -     CBC with Auto Differential; Future  -     Comprehensive Metabolic Panel; Future    Allergic reaction to drug, initial encounter    Other orders  -     predniSONE (DELTASONE) 10 MG tablet; 3 tabs once daily for 3 days, 2 tabs once daily for 3 days, 1 tab once daily for 3 days        The patient is to call for any concerns or return if any of the signs or symptoms worsen. The patient is to follow-up with PCP in the next 2-3 days for repeat evaluation repeat assessment or go directly to the emergency department.     SIGNATURE: Vernon Crouch III, PA-C    This document may have been prepared at least partially through the use of voice recognition software. Although effort is taken to assure the accuracy ofthis document, it is possible that grammatical, syntax, or spelling errors may occur.     **This report was transcribed using voice recognition software. The patient (or

## 2025-05-31 ENCOUNTER — RESULTS FOLLOW-UP (OUTPATIENT)
Dept: FAMILY MEDICINE CLINIC | Age: 45
End: 2025-05-31

## 2025-05-31 NOTE — RESULT ENCOUNTER NOTE
Patient CMP was within normal limits other than a low bicarb which has been her trend over the last several months with blood work.  Glucose was elevated but this was not fasting.  The patient had a normal white blood cell count.  Hemoglobin hematocrit were 11.4 and 33.8 which seems to be lower than it was 3 months ago by about 2 g.  Platelet count is low at 111.    The patient does have abnormal blasts in her differential.    The patient does need further workup with PCP.  Can you see if you can schedule patient close follow-up.  Also if she is having worsening symptoms in the stomach we did offer her evaluation in the emergency department for a CT scan I would have her go directly to the ED for evaluation

## 2025-06-06 ENCOUNTER — OFFICE VISIT (OUTPATIENT)
Dept: FAMILY MEDICINE CLINIC | Age: 45
End: 2025-06-06

## 2025-06-06 VITALS
HEART RATE: 87 BPM | RESPIRATION RATE: 18 BRPM | SYSTOLIC BLOOD PRESSURE: 122 MMHG | WEIGHT: 158 LBS | DIASTOLIC BLOOD PRESSURE: 72 MMHG | TEMPERATURE: 98.6 F | HEIGHT: 60 IN | OXYGEN SATURATION: 97 % | BODY MASS INDEX: 31.02 KG/M2

## 2025-06-06 DIAGNOSIS — R21 RASH AND NONSPECIFIC SKIN ERUPTION: Primary | ICD-10-CM

## 2025-06-06 DIAGNOSIS — E11.65 TYPE 2 DIABETES MELLITUS WITH HYPERGLYCEMIA, WITHOUT LONG-TERM CURRENT USE OF INSULIN (HCC): ICD-10-CM

## 2025-06-06 RX ORDER — CIMETIDINE 400 MG
TABLET ORAL
Refills: 0 | OUTPATIENT
Start: 2025-06-06

## 2025-06-06 RX ORDER — FAMOTIDINE 20 MG/1
20 TABLET, FILM COATED ORAL 2 TIMES DAILY
Qty: 60 TABLET | Refills: 0 | Status: SHIPPED | OUTPATIENT
Start: 2025-06-06 | End: 2025-07-06

## 2025-06-06 RX ORDER — HYDROXYZINE PAMOATE 25 MG/1
25 CAPSULE ORAL 3 TIMES DAILY PRN
Qty: 30 CAPSULE | Refills: 0 | Status: SHIPPED | OUTPATIENT
Start: 2025-06-06 | End: 2025-06-20

## 2025-06-06 RX ORDER — TRIAMCINOLONE ACETONIDE 40 MG/ML
40 INJECTION, SUSPENSION INTRA-ARTICULAR; INTRAMUSCULAR ONCE
Status: COMPLETED | OUTPATIENT
Start: 2025-06-06 | End: 2025-06-06

## 2025-06-06 RX ADMIN — TRIAMCINOLONE ACETONIDE 40 MG: 40 INJECTION, SUSPENSION INTRA-ARTICULAR; INTRAMUSCULAR at 14:40

## 2025-06-06 NOTE — PROGRESS NOTES
25  Linda John : 1980 Sex: female  Age 44 y.o.      Subjective:  Chief Complaint   Patient presents with    Allergies    Facial Swelling         HPI:     History of Present Illness  The patient is a 44-year-old female who presents for evaluation of itching and bruising.    She reports experiencing pruritus in her hand, which was particularly severe upon awakening yesterday. The onset of these symptoms was noted last week, with a significant exacerbation on Friday. She also describes a sensation of tightness in her chest and episodes of palpitations. 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 She has been prescribed prednisone, which she is currently taking once daily. She was previously on cefdinir, an antibiotic, but discontinued its use.    Additionally, she mentions the development of bruises on her hand while cooking. She has an upcoming appointment with Dr. Quezada, which she needs to reschedule from Friday to Thursday.            ROS:   Unless otherwise stated in this report the patient's positive and negative responses for review of systems for constitutional, eyes, ENT, cardiovascular, respiratory, gastrointestinal, neurological, , musculoskeletal, and integument systems and related systems to the presenting problem are either stated in the history of present illness or were not pertinent or were negative for the symptoms and/or complaints related to the presenting medical problem.  Positives and pertinent negatives as per HPI.  All others reviewed and are negative.      PMH:     Past Medical History:   Diagnosis Date    Carpal tunnel syndrome, right     Diabetes mellitus (HCC)     GERD (gastroesophageal reflux disease)     Hyperlipidemia     Hypothyroidism        Past Surgical History:   Procedure Laterality Date    CARPAL TUNNEL RELEASE Right 3/18/2020    RIGHT CARPAL TUNNEL RELEASE performed

## 2025-06-09 ENCOUNTER — RESULTS FOLLOW-UP (OUTPATIENT)
Dept: SURGERY | Age: 45
End: 2025-06-09

## 2025-06-09 DIAGNOSIS — R10.11 RUQ ABDOMINAL PAIN: Primary | ICD-10-CM

## 2025-06-12 ENCOUNTER — OFFICE VISIT (OUTPATIENT)
Dept: PRIMARY CARE CLINIC | Age: 45
End: 2025-06-12

## 2025-06-12 VITALS
TEMPERATURE: 97.3 F | HEIGHT: 60 IN | HEART RATE: 85 BPM | SYSTOLIC BLOOD PRESSURE: 130 MMHG | BODY MASS INDEX: 31.02 KG/M2 | DIASTOLIC BLOOD PRESSURE: 74 MMHG | RESPIRATION RATE: 16 BRPM | OXYGEN SATURATION: 99 % | WEIGHT: 158 LBS

## 2025-06-12 DIAGNOSIS — D69.6 THROMBOCYTOPENIA: ICD-10-CM

## 2025-06-12 DIAGNOSIS — D64.9 ANEMIA, UNSPECIFIED TYPE: ICD-10-CM

## 2025-06-12 DIAGNOSIS — T78.40XD ALLERGIC REACTION, SUBSEQUENT ENCOUNTER: Primary | ICD-10-CM

## 2025-06-12 DIAGNOSIS — E78.2 MIXED HYPERLIPIDEMIA: ICD-10-CM

## 2025-06-12 DIAGNOSIS — E03.9 ACQUIRED HYPOTHYROIDISM: ICD-10-CM

## 2025-06-12 DIAGNOSIS — E11.65 TYPE 2 DIABETES MELLITUS WITH HYPERGLYCEMIA, WITHOUT LONG-TERM CURRENT USE OF INSULIN (HCC): ICD-10-CM

## 2025-06-12 DIAGNOSIS — E55.9 VITAMIN D DEFICIENCY: ICD-10-CM

## 2025-06-12 RX ORDER — ERGOCALCIFEROL 1.25 MG/1
50000 CAPSULE, LIQUID FILLED ORAL WEEKLY
Qty: 12 CAPSULE | Refills: 1 | Status: SHIPPED | OUTPATIENT
Start: 2025-06-12

## 2025-06-12 ASSESSMENT — ENCOUNTER SYMPTOMS
NAUSEA: 0
DIARRHEA: 0
EYE REDNESS: 0
CHEST TIGHTNESS: 0
ALLERGIC REACTION: 1
HYPERVENTILATION: 0
TROUBLE SWALLOWING: 0
BACK PAIN: 0
EYE PAIN: 0
GLOBUS SENSATION: 0
STRIDOR: 0
APNEA: 0
DIFFICULTY BREATHING: 0
RHINORRHEA: 0
EYE ITCHING: 0
SORE THROAT: 0
VOMITING: 0
SHORTNESS OF BREATH: 0
COUGH: 0
WHEEZING: 0
CONSTIPATION: 0
EYE WATERING: 0
SINUS PRESSURE: 0
BLOOD IN STOOL: 0
COLOR CHANGE: 0
ABDOMINAL PAIN: 0

## 2025-06-12 NOTE — PROGRESS NOTES
treatment plan.     Advised patient to call with any new medication issues, and read all Rx info from pharmacy to assure aware of all possible risks and side effects of medication before taking.       Return in about 3 months (around 9/12/2025) for Follow up DM, Follow up Lipids, Recheck labs, Recheck Meds.    I spent 30 minutes with this patient providing this service today, including time spent seeing the patient as well as documentation and chart review, excluding any separately billed procedures.     Lio Quezada DO  6/12/2025  9:22 AM

## 2025-06-28 PROBLEM — Z12.11 SCREEN FOR COLON CANCER: Status: RESOLVED | Noted: 2025-05-29 | Resolved: 2025-06-28

## 2025-07-01 RX ORDER — GLIMEPIRIDE 4 MG/1
4 TABLET ORAL
Qty: 90 TABLET | Refills: 3 | OUTPATIENT
Start: 2025-07-01

## 2025-07-09 NOTE — PROGRESS NOTES
[facility]  [address] [address]  Dept: [phone]  Loc: [phone]    Clinic Consultation Note      Date of Encounter: 07/10/2025     Referring Provider: Lio Quezada DO      Reason for Visit:    thrombocytopenia        PCP:  Lio Quezada DO    Demographics: 44 y.o. female    Chief Complaint   Patient presents with    New Patient    Anemia         History of Present Illness of Initial Consultation :  The patient is a 44 y.o. female with hx of carpal tunnel syndrome, dm, GERD, hyperlipidemia, hypothyroidism, who is being referred due to anemia and thrombocytopenia  Patient is feeling well today except chronic fatigue, she reported some bruising couple weeks ago as well but not currently, no fevers, no chills, no night sweats.   She had bad allergies couple months ago and was placed on steroids for it as she said. Also currently they are better.   No alcohol, no smoking, no new medications. No heavy periods currently. No blood in stool or urine.   No personal or family hx of cancer or blood disorder.   She had an abscess in the liver 5 years ago that was drained and never recurred.   No other infections.         ONCOLOGIC HISTORY:    Oncology History    No history exists.           Past Medical History:   Diagnosis Date    Carpal tunnel syndrome, right     Diabetes mellitus (HCC)     GERD (gastroesophageal reflux disease)     Hyperlipidemia     Hypothyroidism        Past Surgical History:   Procedure Laterality Date    CARPAL TUNNEL RELEASE Right 3/18/2020    RIGHT CARPAL TUNNEL RELEASE performed by Toni Farmer MD at [facility] OR     INSERT PICC CATH, 5/> YRS  7/4/2020            Social History     Socioeconomic History    Marital status:      Spouse name: Not on file    Number of children: Not on file    Years of education: Not on file    Highest education level: Not on file   Occupational History

## 2025-07-10 ENCOUNTER — OFFICE VISIT (OUTPATIENT)
Age: 45
End: 2025-07-10
Payer: COMMERCIAL

## 2025-07-10 ENCOUNTER — HOSPITAL ENCOUNTER (OUTPATIENT)
Dept: INFUSION THERAPY | Age: 45
Discharge: HOME | End: 2025-07-10
Payer: COMMERCIAL

## 2025-07-10 VITALS
TEMPERATURE: 97.6 F | HEART RATE: 83 BPM | HEIGHT: 60 IN | BODY MASS INDEX: 29.45 KG/M2 | DIASTOLIC BLOOD PRESSURE: 84 MMHG | SYSTOLIC BLOOD PRESSURE: 133 MMHG | WEIGHT: 150 LBS | OXYGEN SATURATION: 100 %

## 2025-07-10 DIAGNOSIS — D69.6 THROMBOCYTOPENIA: Primary | ICD-10-CM

## 2025-07-10 DIAGNOSIS — D69.6 THROMBOCYTOPENIA: ICD-10-CM

## 2025-07-10 LAB
ALBUMIN SERPL-MCNC: 4.1 G/DL (ref 3.5–5.2)
ALP SERPL-CCNC: 64 U/L (ref 35–104)
ALT SERPL-CCNC: 27 U/L (ref 0–35)
ANION GAP SERPL CALCULATED.3IONS-SCNC: 11 MMOL/L (ref 7–16)
AST SERPL-CCNC: 26 U/L (ref 0–35)
BASOPHILS # BLD: 0.07 K/UL (ref 0–0.2)
BASOPHILS NFR BLD: 1 % (ref 0–2)
BILIRUB SERPL-MCNC: 0.3 MG/DL (ref 0–1.2)
BUN SERPL-MCNC: 18 MG/DL (ref 6–20)
CALCIUM SERPL-MCNC: 9.4 MG/DL (ref 8.6–10)
CHLORIDE SERPL-SCNC: 105 MMOL/L (ref 98–107)
CO2 SERPL-SCNC: 24 MMOL/L (ref 22–29)
CREAT SERPL-MCNC: 0.7 MG/DL (ref 0.5–1)
EOSINOPHIL # BLD: 0.16 K/UL (ref 0.05–0.5)
EOSINOPHILS RELATIVE PERCENT: 3 % (ref 0–6)
ERYTHROCYTE [DISTWIDTH] IN BLOOD BY AUTOMATED COUNT: 13.4 % (ref 11.5–15)
FERRITIN SERPL-MCNC: 536 NG/ML
FIBRINOGEN PPP-MCNC: 359 MG/DL (ref 200–400)
FOLATE SERPL-MCNC: 6.8 NG/ML (ref 4.6–34.8)
GFR, ESTIMATED: >90 ML/MIN/1.73M2
GLUCOSE SERPL-MCNC: 147 MG/DL (ref 74–99)
HAPTOGLOB SERPL-MCNC: 106 MG/DL (ref 30–200)
HCT VFR BLD AUTO: 39.8 % (ref 34–48)
HGB BLD-MCNC: 12.7 G/DL (ref 11.5–15.5)
IMM GRANULOCYTES # BLD AUTO: <0.03 K/UL (ref 0–0.58)
IMM GRANULOCYTES NFR BLD: 0 % (ref 0–5)
INR PPP: 0.9
IRON SATN MFR SERPL: 29 % (ref 15–50)
IRON SERPL-MCNC: 80 UG/DL (ref 37–145)
LDH SERPL-CCNC: 185 U/L (ref 135–214)
LYMPHOCYTES NFR BLD: 2.59 K/UL (ref 1.5–4)
LYMPHOCYTES RELATIVE PERCENT: 41 % (ref 20–42)
MCH RBC QN AUTO: 29.1 PG (ref 26–35)
MCHC RBC AUTO-ENTMCNC: 31.9 G/DL (ref 32–34.5)
MCV RBC AUTO: 91.3 FL (ref 80–99.9)
MONOCYTES NFR BLD: 0.41 K/UL (ref 0.1–0.95)
MONOCYTES NFR BLD: 6 % (ref 2–12)
NEUTROPHILS NFR BLD: 49 % (ref 43–80)
NEUTS SEG NFR BLD: 3.11 K/UL (ref 1.8–7.3)
PARTIAL THROMBOPLASTIN TIME: 27.3 SEC (ref 24.5–35.1)
PLATELET # BLD AUTO: 178 K/UL (ref 130–450)
PMV BLD AUTO: 11.4 FL (ref 7–12)
POTASSIUM SERPL-SCNC: 4.4 MMOL/L (ref 3.5–5.1)
PROT SERPL-MCNC: 7.3 G/DL (ref 6.4–8.3)
PROTHROMBIN TIME: 9.8 SEC (ref 9.3–12.4)
RBC # BLD AUTO: 4.36 M/UL (ref 3.5–5.5)
SODIUM SERPL-SCNC: 140 MMOL/L (ref 136–145)
TIBC SERPL-MCNC: 276 UG/DL (ref 250–450)
URATE SERPL-MCNC: 6.3 MG/DL (ref 2.4–5.7)
VIT B12 SERPL-MCNC: 499 PG/ML (ref 232–1245)
WBC OTHER # BLD: 6.4 K/UL (ref 4.5–11.5)

## 2025-07-10 PROCEDURE — 83540 ASSAY OF IRON: CPT

## 2025-07-10 PROCEDURE — 83615 LACTATE (LD) (LDH) ENZYME: CPT

## 2025-07-10 PROCEDURE — 36415 COLL VENOUS BLD VENIPUNCTURE: CPT

## 2025-07-10 PROCEDURE — 85384 FIBRINOGEN ACTIVITY: CPT

## 2025-07-10 PROCEDURE — 82607 VITAMIN B-12: CPT

## 2025-07-10 PROCEDURE — 83550 IRON BINDING TEST: CPT

## 2025-07-10 PROCEDURE — 85025 COMPLETE CBC W/AUTO DIFF WBC: CPT

## 2025-07-10 PROCEDURE — 83010 ASSAY OF HAPTOGLOBIN QUANT: CPT

## 2025-07-10 PROCEDURE — 80053 COMPREHEN METABOLIC PANEL: CPT

## 2025-07-10 PROCEDURE — 85730 THROMBOPLASTIN TIME PARTIAL: CPT

## 2025-07-10 PROCEDURE — 85610 PROTHROMBIN TIME: CPT

## 2025-07-10 PROCEDURE — 84550 ASSAY OF BLOOD/URIC ACID: CPT

## 2025-07-10 PROCEDURE — 82728 ASSAY OF FERRITIN: CPT

## 2025-07-10 PROCEDURE — 99204 OFFICE O/P NEW MOD 45 MIN: CPT | Performed by: INTERNAL MEDICINE

## 2025-07-10 PROCEDURE — 82746 ASSAY OF FOLIC ACID SERUM: CPT

## 2025-07-10 NOTE — PROGRESS NOTES
Linda John  1980 44 y.o.      Referring Physician:     PCP: Lio Quezada DO    Vitals:    07/10/25 1112   BP: 133/84   Pulse: 83   Temp: 97.6 °F (36.4 °C)   SpO2: 100%        Wt Readings from Last 3 Encounters:   07/10/25 68 kg (150 lb)   25 71.7 kg (158 lb)   25 71.7 kg (158 lb)        Body mass index is 29.29 kg/m².          Chief Complaint:   Chief Complaint   Patient presents with    New Patient    Anemia           LMP: 2025    Age at first Menses: 12    : 2    Para:           Current Outpatient Medications:     vitamin D (ERGOCALCIFEROL) 1.25 MG (62064 UT) CAPS capsule, Take 1 capsule by mouth once a week, Disp: 12 capsule, Rfl: 1    levothyroxine (SYNTHROID) 100 MCG tablet, Take 1 tablet by mouth daily, Disp: 90 tablet, Rfl: 3    glimepiride (AMARYL) 4 MG tablet, Take 1 tablet by mouth every morning (before breakfast), Disp: 90 tablet, Rfl: 3    atorvastatin (LIPITOR) 40 MG tablet, Take 1 tablet by mouth daily, Disp: 90 tablet, Rfl: 1    chlorpheniramine (ALLER-CHLOR) 4 MG tablet, Take 1 tablet by mouth every 6 hours as needed for Allergies, Disp: 20 tablet, Rfl: 0    semaglutide, 2 MG/DOSE, (OZEMPIC) 8 MG/3ML SOPN sc injection, Inject 2 mg into the skin every 7 days, Disp: 3 mL, Rfl: 3    fluticasone (FLONASE) 50 MCG/ACT nasal spray, 2 sprays by Each Nostril route daily, Disp: 1 each, Rfl: 2    LIFESCAN FINEPOINT LANCETS MISC, Test TID with meals, Disp: 100 each, Rfl: 1    blood glucose test strips (ONETOUCH ULTRA) strip, 1 each by In Vitro route daily As needed., Disp: 100 each, Rfl: 3    Blood Glucose Monitoring Suppl (ONE TOUCH ULTRA 2) w/Device KIT, 1 kit by Does not apply route daily, Disp: 1 kit, Rfl: 0    famotidine (PEPCID) 20 MG tablet, Take 1 tablet by mouth 2 times daily, Disp: 60 tablet, Rfl: 0       Past Medical History:   Diagnosis Date    Carpal tunnel syndrome, right     Diabetes mellitus (HCC)     GERD (gastroesophageal reflux disease)

## 2025-07-11 LAB
SEND OUT REPORT: NORMAL
SEND OUT REPORT: NORMAL
TEST NAME: NORMAL
TEST NAME: NORMAL

## 2025-07-14 LAB
PATH REV BLD -IMP: NORMAL
SEND OUT REPORT: NORMAL
TEST NAME: NORMAL

## 2025-07-17 LAB
SEND OUT REPORT: NORMAL
TEST NAME: NORMAL

## 2025-07-21 NOTE — PROGRESS NOTES
[facility] PRE-ADMISSION TESTING INSTRUCTIONS    The Preadmission Testing patient is instructed accordingly using the following criteria (check applicable):    ARRIVAL INSTRUCTIONS:   Arrival Time: 0930    [x] Parking the day of Surgery is located in the Main Entrance lot.  Upon entering through the main entrance (Entrance A) make an immediate right to the surgery reception desk    [x] Bring photo ID and insurance card    [] Bring in a copy of Living will or Durable Power of  papers.    [x] Please be sure to arrange for a responsible adult to provide transportation to and from the hospital    [x] Please arrange for a responsible adult to be with you for the 24 hour period post procedure, due to having anesthesia    [x] Please notify surgeon if you develop any illness between now and time of surgery (cold, cough, sore throat, fever, nausea, vomiting) or any signs of infections  including skin, wounds, and dental.    [x] If you awake am of surgery not feeling well or have temperature >100 please call [phone].    GENERAL INSTRUCTIONS:    [x] Follow Dr. Green's pre-procedure dietary instructions, may have up to 8oz of water until 4 hours prior to surgery. No gum, no candy, no mints.  NPO time: 0700       [x] You may brush your teeth    [] Take medications as instructed (Read back and verified by patient)   Take none    [x] Stop herbal supplements and vitamins 5 days prior to procedure    [x] No oral diabetic medications after midnight    [x] If diabetic and have low blood sugar or feel symptomatic, take 1-2oz apple juice only    [x] Bring urine specimen day of surgery    [x] Shower or bath with soap, lather and rinse well, AM of Surgery, no lotion, powders or creams to surgical site    [x] Please do not wear any nail polish, make up, hair products, body spray, aftershave, cologne or perfume on the day of surgery    [x] Jewelry, body piercings, eyeglasses, contact lenses

## 2025-07-22 PROBLEM — Z12.11 SCREEN FOR COLON CANCER: Status: ACTIVE | Noted: 2025-05-29

## 2025-07-24 ENCOUNTER — ANESTHESIA EVENT (OUTPATIENT)
Dept: ENDOSCOPY | Age: 45
End: 2025-07-24
Payer: COMMERCIAL

## 2025-07-24 ASSESSMENT — LIFESTYLE VARIABLES: SMOKING_STATUS: 0

## 2025-07-24 NOTE — ANESTHESIA PRE PROCEDURE
Department of Anesthesiology  Preprocedure Note       Name:  Linda John   Age:  45 y.o.  :  1980                                          MRN:  [id number]         Date:  2025      Surgeon: Surgeon(s):  Chanel Green MD    Procedure: Procedure(s):  COLORECTAL CANCER SCREENING, NOT HIGH RISK             ++IODINE ALLERGY++    Medications prior to admission:   Prior to Admission medications    Medication Sig Start Date End Date Taking? Authorizing Provider   MAGNESIUM PO Take by mouth daily   Yes Provider, MD Rebecca   fluticasone (FLONASE) 50 MCG/ACT nasal spray 2 sprays by Each Nostril route daily  Patient taking differently: 2 sprays by Each Nostril route daily as needed 24  Yes Lio Quezada DO   vitamin D (ERGOCALCIFEROL) 1.25 MG (28779 UT) CAPS capsule Take 1 capsule by mouth once a week 25   Lio Quezada DO   famotidine (PEPCID) 20 MG tablet Take 1 tablet by mouth 2 times daily  Patient not taking: Reported on 2025  Veronn Crouch III, PA   levothyroxine (SYNTHROID) 100 MCG tablet Take 1 tablet by mouth daily 25   London Hammonds MD   glimepiride (AMARYL) 4 MG tablet Take 1 tablet by mouth every morning (before breakfast) 25   London Hammonds MD   atorvastatin (LIPITOR) 40 MG tablet Take 1 tablet by mouth daily 25   Lio Quezada DO   chlorpheniramine (ALLER-CHLOR) 4 MG tablet Take 1 tablet by mouth every 6 hours as needed for Allergies 25   Vernon Crouch III, PA   semaglutide, 2 MG/DOSE, (OZEMPIC) 8 MG/3ML SOPN sc injection Inject 2 mg into the skin every 7 days 3/18/25   Alejandra Rivers, APRN - NP   WeembaCAN FINEPOINT LANCETS MISC Test TID with meals 23   Lio Quezada DO   blood glucose test strips (ONETOUCH ULTRA) strip 1 each by In Vitro route daily As needed. 23   Pilar, Lio F, DO   Blood Glucose Monitoring Suppl (ONE TOUCH ULTRA 2) w/Device KIT 1 kit by Does not apply

## 2025-07-25 ENCOUNTER — ANESTHESIA (OUTPATIENT)
Dept: ENDOSCOPY | Age: 45
End: 2025-07-25
Payer: COMMERCIAL

## 2025-07-25 ENCOUNTER — HOSPITAL ENCOUNTER (OUTPATIENT)
Age: 45
Setting detail: OUTPATIENT SURGERY
Discharge: HOME | End: 2025-07-25
Attending: SURGERY | Admitting: SURGERY
Payer: COMMERCIAL

## 2025-07-25 VITALS
TEMPERATURE: 97.6 F | BODY MASS INDEX: 28.66 KG/M2 | HEART RATE: 74 BPM | DIASTOLIC BLOOD PRESSURE: 56 MMHG | OXYGEN SATURATION: 98 % | WEIGHT: 146 LBS | HEIGHT: 60 IN | RESPIRATION RATE: 17 BRPM | SYSTOLIC BLOOD PRESSURE: 112 MMHG

## 2025-07-25 LAB
HCG, URINE, POC: NEGATIVE
Lab: NORMAL
NEGATIVE QC PASS/FAIL: NORMAL
POSITIVE QC PASS/FAIL: NORMAL

## 2025-07-25 PROCEDURE — 45378 DIAGNOSTIC COLONOSCOPY: CPT | Performed by: SURGERY

## 2025-07-25 PROCEDURE — 2709999900 HC NON-CHARGEABLE SUPPLY: Performed by: SURGERY

## 2025-07-25 PROCEDURE — 3609027000 HC COLONOSCOPY: Performed by: SURGERY

## 2025-07-25 PROCEDURE — 7100000010 HC PHASE II RECOVERY - FIRST 15 MIN: Performed by: SURGERY

## 2025-07-25 PROCEDURE — 7100000011 HC PHASE II RECOVERY - ADDTL 15 MIN: Performed by: SURGERY

## 2025-07-25 PROCEDURE — 2580000003 HC RX 258

## 2025-07-25 PROCEDURE — 3700000000 HC ANESTHESIA ATTENDED CARE: Performed by: SURGERY

## 2025-07-25 PROCEDURE — 6360000002 HC RX W HCPCS

## 2025-07-25 RX ORDER — PROPOFOL 10 MG/ML
INJECTION, EMULSION INTRAVENOUS
Status: DISCONTINUED | OUTPATIENT
Start: 2025-07-25 | End: 2025-07-25 | Stop reason: SDUPTHER

## 2025-07-25 RX ORDER — SODIUM CHLORIDE 9 MG/ML
INJECTION, SOLUTION INTRAVENOUS CONTINUOUS
Status: DISCONTINUED | OUTPATIENT
Start: 2025-07-25 | End: 2025-07-25 | Stop reason: HOSPADM

## 2025-07-25 RX ORDER — SODIUM CHLORIDE 9 MG/ML
INJECTION, SOLUTION INTRAVENOUS
Status: DISCONTINUED | OUTPATIENT
Start: 2025-07-25 | End: 2025-07-25 | Stop reason: SDUPTHER

## 2025-07-25 RX ADMIN — PROPOFOL 400 MG: 10 INJECTION, EMULSION INTRAVENOUS at 09:56

## 2025-07-25 RX ADMIN — SODIUM CHLORIDE: 9 INJECTION, SOLUTION INTRAVENOUS at 09:55

## 2025-07-25 ASSESSMENT — PAIN - FUNCTIONAL ASSESSMENT
PAIN_FUNCTIONAL_ASSESSMENT: 0-10
PAIN_FUNCTIONAL_ASSESSMENT: NONE - DENIES PAIN

## 2025-07-25 NOTE — DISCHARGE INSTRUCTIONS
[facility] Colonoscopy PROCEDURE DISCHARGE INSTRUCTIONS  You may be drowsy or lightheaded after receiving sedation or anesthesia.    A responsible person should be with you for the next 24 hours.    Please follow the instructions checked below:    DIET INSTRUCTIONS:  [x]Start with light diet and progress to your normal diet as you feel like eating. If you experience nausea or repeated episodes of vomiting which persist beyond 12-24 hours, notify your doctor.  []Other     ACTIVITY INSTRUCTIONS:  [x]Rest today. Increase activity as tolerated    []No heavy lifting or strenuous activity     [x]No driving for today  []Other      MEDICATION INSTRUCTIONS:    []Prescriptions sent with you.  Use as directed.  When taking pain medications, you may experience dizziness or drowsiness.  Do not drink alcohol or drive when taking these medications.  [x]Continue preop medications                               Post-procedure Care   If any tissue was removed:   It will be sent to a lab to be examined. It may take 1-2 weeks for results. The doctor will usually give an initial report after the scope is removed. Other tests may be recommended.   A small amount of bleeding may occur during the first few days after the procedure.     When you return home after the procedure, be sure to follow your doctor's instructions, which may include:   Resume medicines as instructed by your doctor.   Resume normal diet, unless directed otherwise by your doctor.   The sedative will make you drowsy. Avoid driving, operating machinery, or making important decisions for the rest of the day.   Rest for the remainder of the day.     After arriving home, contact your doctor if any of the following occurs:   Bleeding from your rectum, notify your doctor if you pass a teaspoonful of blood or more.   Black, tarry stools   Severe abdominal pain   Hard, swollen abdomen   Signs of infection, including fever or chills   Inability to pass gas or

## 2025-07-25 NOTE — OP NOTE
Operative Note: Colonoscopy    Linda John     DATE OF PROCEDURE: 7/25/2025  SURGEON: Dr. TERI WONG MD, M.D.     PREOPERATIVE DIAGNOSES:    Screening colonoscopy    POSTOPERATIVE DIAGNOSES:  Normal appearing colon    SPECIMENS:  * No specimens in log *     OPERATION:   Colonoscopy to the cecum      ANESTHESIA: LMAC    COMPLICATIONS: None.     BLOOD LOSS: Minimal    Procedure Note:    CONSENT AND INDICATIONS:  This is a 45 y.o. year old female who is having a screening colonoscopy today.  I have discussed with the patient and/or the patient representative the indication, alternatives, and the possible risks and/or complications of the planned procedure and the anesthesia methods. The patient and/or patient representative appear to understand and agree to proceed.    PROCEDURE: Bowel prep was done yesterday until the bowels were clear. The patient was placed on the table and sedated by anesthesia. A rectal exam was performed and no mass was felt. A lubricated scope was passed into the rectum which looked normal.  The scope was passed all the way around through the sigmoid, descending, transverse and ascending colon to the cecum. The bowel prep was clear. No abnormalities were seen. The cecum was identified by the appendiceal orifice, ileocecal valve, and light reflex in the RLQ.The scope was then slowly withdrawn, each area was examined again on the way out.  The scope was retroflexed in the rectum and it was normal. Withdrawal time was at least 6 minutes. The patient tolerated the procedure well.     PLAN:    Follow up colonoscopy in 10 years.    Physician Signature: Electronically signed by Dr. TERI WONG MD M.D. FACS    Send copy of H&P to PCP, Lio Quezada DO and referring physician, No ref. provider found

## 2025-07-25 NOTE — H&P
Patient's office history and physical was reviewed.    Patient examined.    There has been no change in the patient's history and physical.      Physician Signature: [facility]    Patient's Name/Date of Birth: Linda John / 1980      PCP: Lio Quezada DO    Referring Physician:   No ref. provider found  N/A    CHIEF COMPLAINT:    No chief complaint on file.        HISTORY OF PRESENT ILLNESS:    Linda John is an 45 y.o. female who presents for a colonoscopy. The patient denies any symptoms. No nausea, vomiting, diarrhea. She has constipation as well, though she said this is new. No changes in stool caliber. No bloody or black stools. No unintentional weight loss. No family history of colon cancer. The patient has a known history of: no known risk factors. The patient has never had a colonoscopy before.    The patient has a history of a liver abscess in 2020. It was drained. A CT done at this time in 2020 showed gallstones. She said recently she started having problems again. Last week she started having RUQ pain shooting to her back. She has nausea as well. She is having trouble sleeping because of the pain. She has diarrhea as well. She had an xray of her abdomen yesterday that showed constipation. Eating makes her pain worse. She is bloated.     Past Medical History:   Past Medical History:   Diagnosis Date    Diabetes mellitus (HCC)     GERD (gastroesophageal reflux disease)     Hyperlipidemia     Hypothyroidism         Past Surgical History:   Past Surgical History:   Procedure Laterality Date    CARPAL TUNNEL RELEASE Right 03/18/2020    RIGHT CARPAL TUNNEL RELEASE performed by T[facility] Farmer MD at [facility] OR     INSERT PICC CATH, 5/> YRS  07/04/2020    removed        Allergies: Iodine and Shellfish-derived products     Medications:   No current facility-administered medications for this encounter.      procedure.     Miralax Gatorade Prep will be used.    RUQ US - h/o liver abscess, gallstones seen on past CT     Physician Signature: Electronically signed by Chanel Green MD, General Surgery    Send copy of H&P to PCP, Lio Quezada DO and referring physician, No ref. provider found

## 2025-07-28 ENCOUNTER — TELEPHONE (OUTPATIENT)
Dept: ENDOCRINOLOGY | Age: 45
End: 2025-07-28

## 2025-08-01 ENCOUNTER — HOSPITAL ENCOUNTER (OUTPATIENT)
Dept: CT IMAGING | Age: 45
End: 2025-08-01
Payer: COMMERCIAL

## 2025-08-01 VITALS
WEIGHT: 150 LBS | RESPIRATION RATE: 20 BRPM | DIASTOLIC BLOOD PRESSURE: 78 MMHG | SYSTOLIC BLOOD PRESSURE: 138 MMHG | BODY MASS INDEX: 29.45 KG/M2 | HEART RATE: 84 BPM | HEIGHT: 60 IN | TEMPERATURE: 97.5 F | OXYGEN SATURATION: 97 %

## 2025-08-01 DIAGNOSIS — D69.6 THROMBOCYTOPENIA: ICD-10-CM

## 2025-08-01 LAB — HCG UR QL: NEGATIVE

## 2025-08-01 PROCEDURE — 6360000002 HC RX W HCPCS: Performed by: RADIOLOGY

## 2025-08-01 PROCEDURE — 7100000011 HC PHASE II RECOVERY - ADDTL 15 MIN

## 2025-08-01 PROCEDURE — 2720000010 CT BONE MARROW BIOPSY AND ASPIRATION

## 2025-08-01 PROCEDURE — 77012 CT SCAN FOR NEEDLE BIOPSY: CPT

## 2025-08-01 PROCEDURE — 38222 DX BONE MARROW BX & ASPIR: CPT

## 2025-08-01 PROCEDURE — 84703 CHORIONIC GONADOTROPIN ASSAY: CPT

## 2025-08-01 PROCEDURE — 2709999900 CT BONE MARROW BIOPSY AND ASPIRATION

## 2025-08-01 PROCEDURE — 7100000010 HC PHASE II RECOVERY - FIRST 15 MIN

## 2025-08-01 RX ORDER — LIDOCAINE HYDROCHLORIDE 20 MG/ML
INJECTION, SOLUTION INFILTRATION; PERINEURAL PRN
Status: COMPLETED | OUTPATIENT
Start: 2025-08-01 | End: 2025-08-01

## 2025-08-01 RX ORDER — LIDOCAINE HYDROCHLORIDE AND EPINEPHRINE BITARTRATE 20; .01 MG/ML; MG/ML
INJECTION, SOLUTION SUBCUTANEOUS PRN
Status: COMPLETED | OUTPATIENT
Start: 2025-08-01 | End: 2025-08-01

## 2025-08-01 RX ORDER — DIPHENHYDRAMINE HYDROCHLORIDE 50 MG/ML
INJECTION, SOLUTION INTRAMUSCULAR; INTRAVENOUS PRN
Status: COMPLETED | OUTPATIENT
Start: 2025-08-01 | End: 2025-08-01

## 2025-08-01 RX ORDER — MIDAZOLAM HYDROCHLORIDE 1 MG/ML
INJECTION, SOLUTION INTRAMUSCULAR; INTRAVENOUS PRN
Status: COMPLETED | OUTPATIENT
Start: 2025-08-01 | End: 2025-08-01

## 2025-08-01 RX ORDER — FENTANYL CITRATE 50 UG/ML
INJECTION, SOLUTION INTRAMUSCULAR; INTRAVENOUS PRN
Status: COMPLETED | OUTPATIENT
Start: 2025-08-01 | End: 2025-08-01

## 2025-08-01 RX ADMIN — LIDOCAINE HYDROCHLORIDE 8 ML: 20 INJECTION, SOLUTION INFILTRATION; PERINEURAL at 11:58

## 2025-08-01 RX ADMIN — LIDOCAINE HYDROCHLORIDE,EPINEPHRINE BITARTRATE 5 ML: 20; .01 INJECTION, SOLUTION INFILTRATION; PERINEURAL at 11:59

## 2025-08-01 RX ADMIN — MIDAZOLAM 0.5 MG: 1 INJECTION INTRAMUSCULAR; INTRAVENOUS at 11:59

## 2025-08-01 RX ADMIN — DIPHENHYDRAMINE HYDROCHLORIDE 25 MG: 50 INJECTION, SOLUTION INTRAMUSCULAR; INTRAVENOUS at 11:56

## 2025-08-01 RX ADMIN — FENTANYL CITRATE 25 MCG: 50 INJECTION, SOLUTION INTRAMUSCULAR; INTRAVENOUS at 11:59

## 2025-08-01 RX ADMIN — FENTANYL CITRATE 25 MCG: 50 INJECTION, SOLUTION INTRAMUSCULAR; INTRAVENOUS at 11:56

## 2025-08-01 RX ADMIN — MIDAZOLAM 0.5 MG: 1 INJECTION INTRAMUSCULAR; INTRAVENOUS at 11:56

## 2025-08-01 NOTE — BRIEF OP NOTE
Brief Postoperative Note      Patient: Linda John  YOB: 1980  MRN: [id number]    Date of Procedure: 8/1/2025    Anemia    Post-Op Diagnosis: Same       Bone marrow biopsy    JAXON Turner    Assistant:  * No surgical staff found *    Anesthesia: * Moderate sedation    Estimated Blood Loss (mL): Minimal    Complications: None    Specimens:   Left iliac    Implants:  * No implants in log *      Drains: * No LDAs found *    Findings:  Present At Time Of Surgery (PATOS) (choose all levels that have infection present):  No infection present  Other Findings: 10 G bone marrow biopsy    Electronically signed by Pam Turner MD on 8/1/2025 at 2:29 PM

## 2025-08-01 NOTE — PRE SEDATION
Sedation Pre-Procedure Note    Patient Name: Linda John   YOB: 1980  Room/Bed: Room/bed info not found  Medical Record Number: [id number]  Date: 8/1/2025   Time: 2:23 PM       Indication:  anemia    Consent: I have discussed with the patient and/or the patient representative the indication, alternatives, and the possible risks and/or complications of the planned procedure and the anesthesia methods. The patient and/or patient representative appear to understand and agree to proceed.    Vital Signs:   Vitals:    08/01/25 1314   BP: 138/78   Pulse: 84   Resp: 20   Temp:    SpO2: 97%       Past Medical History:   has a past medical history of Diabetes mellitus (HCC), GERD (gastroesophageal reflux disease), Hyperlipidemia, and Hypothyroidism.    Past Surgical History:   has a past surgical history that includes Carpal tunnel release (Right, 03/18/2020); Insert Picc Cath, 5/> Yrs (07/04/2020); and Colonoscopy (N/A, 7/25/2025).    Medications:   Scheduled Meds:   Continuous Infusions:   PRN Meds:   Home Meds:   Prior to Admission medications    Medication Sig Start Date End Date Taking? Authorizing Provider   MAGNESIUM PO Take by mouth daily   Yes Provider, MD Rebecca   vitamin D (ERGOCALCIFEROL) 1.25 MG (98328 UT) CAPS capsule Take 1 capsule by mouth once a week 6/12/25  Yes Lio Quezada DO   levothyroxine (SYNTHROID) 100 MCG tablet Take 1 tablet by mouth daily 5/16/25  Yes London Hammonds MD   glimepiride (AMARYL) 4 MG tablet Take 1 tablet by mouth every morning (before breakfast) 5/16/25  Yes London Hammonds MD   atorvastatin (LIPITOR) 40 MG tablet Take 1 tablet by mouth daily 5/16/25  Yes Lio Quezada DO   chlorpheniramine (ALLER-CHLOR) 4 MG tablet Take 1 tablet by mouth every 6 hours as needed for Allergies 4/14/25  Yes Vernon Crouch III, PA   semaglutide, 2 MG/DOSE, (OZEMPIC) 8 MG/3ML SOPN sc injection Inject 2 mg into the skin every 7 days 3/18/25  Yes Tita  BECKY Foley - NP   fluticasone (FLONASE) 50 MCG/ACT nasal spray 2 sprays by Each Nostril route daily 12/20/24  Yes Lio Quezada DO   famotidine (PEPCID) 20 MG tablet Take 1 tablet by mouth 2 times daily  Patient not taking: Reported on 7/21/2025 6/6/25 7/6/25  Vernon Crouch III, PA   SWEEPiOCAN FINEPOINT LANCETS MISC Test TID with meals 9/25/23   Lio Quezada,    blood glucose test strips (ONETOUCH ULTRA) strip 1 each by In Vitro route daily As needed. 9/25/23   Lio Quezada,    Blood Glucose Monitoring Suppl (ONE TOUCH ULTRA 2) w/Device KIT 1 kit by Does not apply route daily 5/17/23   Toni Baptiste DO     Coumadin Use Last 7 Days:  no  Antiplatelet drug therapy use last 7 days: no  Other anticoagulant use last 7 days: no  Additional Medication Information:  n/a      Pre-Sedation Documentation and Exam:   I have reviewed the patient's history and review of systems.    Mallampati Airway Assessment:  Mallampati Class II - (soft palate, fauces & uvula are visible)    Prior History of Anesthesia Complications:   none    ASA Classification:  Class 3 - A patient with severe systemic disease that limits activity but is not incapacitating    Sedation/ Anesthesia Plan:   intravenous sedation    Medications Planned:   fentanyl intravenously    Patient is an appropriate candidate for plan of sedation: yes    Electronically signed by Pam Turner MD on 8/1/2025 at 2:23 PM

## 2025-08-01 NOTE — BRIEF OP NOTE
Brief Postoperative Note      Patient: Linda John  YOB: 1980  MRN: [id number]    Date of Procedure: 8/1/2025    Renal insufficiency    Post-Op Diagnosis: Same       Ct guided bone marrow biopsy    JAXON Turner    Assistant:  * No surgical staff found *    Anesthesia: * Mod Sedation    Estimated Blood Loss (mL): Minimal    Complications: None    Specimens:   Left iliac    Implants:  * No implants in log *      Drains: * No LDAs found *    Findings:  Present At Time Of Surgery (PATOS) (choose all levels that have infection present):  No infection present  Other Findings: 18 G biopsy    Electronically signed by Pam Turner MD on 8/1/2025 at 2:24 PM

## 2025-08-01 NOTE — PROGRESS NOTES
1215- Patient returned from procedure. Dressing checked, clean, dry, and intact. Patient stable. No s/s of complications noted or reported. Vitals will be checked q 15min, see flow sheets.    1245 Patient eating and drinking well with no s/s of complications noted or reported.    1250 Dr Turner at bedside to speak, all questions answered.    1330  Patient discharged, site was checked with every set of vitals. Site clean dry and intact. Discharge papers reviewed with patient, questions answered. Patient taken to door. Patient in stable condition, no s/s of complications noted or reported.

## 2025-08-08 ENCOUNTER — HOSPITAL ENCOUNTER (OUTPATIENT)
Dept: NUCLEAR MEDICINE | Age: 45
Discharge: HOME | End: 2025-08-08
Attending: SURGERY
Payer: COMMERCIAL

## 2025-08-08 VITALS — WEIGHT: 150 LBS | BODY MASS INDEX: 29.29 KG/M2

## 2025-08-08 DIAGNOSIS — R10.11 RUQ ABDOMINAL PAIN: ICD-10-CM

## 2025-08-08 PROCEDURE — 3430000000 HC RX DIAGNOSTIC RADIOPHARMACEUTICAL: Performed by: RADIOLOGY

## 2025-08-08 PROCEDURE — 2580000003 HC RX 258: Performed by: RADIOLOGY

## 2025-08-08 PROCEDURE — 6360000002 HC RX W HCPCS: Performed by: RADIOLOGY

## 2025-08-08 PROCEDURE — 78227 HEPATOBIL SYST IMAGE W/DRUG: CPT

## 2025-08-08 PROCEDURE — A9537 TC99M MEBROFENIN: HCPCS | Performed by: RADIOLOGY

## 2025-08-08 RX ADMIN — Medication 6 MILLICURIE: at 11:19

## 2025-08-08 RX ADMIN — SINCALIDE 1.36 MCG: 5 INJECTION, POWDER, LYOPHILIZED, FOR SOLUTION INTRAVENOUS at 12:16

## 2025-08-09 ENCOUNTER — OFFICE VISIT (OUTPATIENT)
Dept: FAMILY MEDICINE CLINIC | Age: 45
End: 2025-08-09

## 2025-08-09 VITALS
WEIGHT: 148.8 LBS | HEART RATE: 89 BPM | HEIGHT: 60 IN | SYSTOLIC BLOOD PRESSURE: 118 MMHG | RESPIRATION RATE: 18 BRPM | TEMPERATURE: 97.3 F | DIASTOLIC BLOOD PRESSURE: 82 MMHG | BODY MASS INDEX: 29.21 KG/M2 | OXYGEN SATURATION: 99 %

## 2025-08-09 DIAGNOSIS — L27.0 ALLERGIC DRUG RASH: Primary | ICD-10-CM

## 2025-08-09 RX ORDER — METHYLPREDNISOLONE ACETATE 80 MG/ML
80 INJECTION, SUSPENSION INTRA-ARTICULAR; INTRALESIONAL; INTRAMUSCULAR; SOFT TISSUE ONCE
Status: COMPLETED | OUTPATIENT
Start: 2025-08-09 | End: 2025-08-09

## 2025-08-09 RX ORDER — PREDNISONE 20 MG/1
20 TABLET ORAL 2 TIMES DAILY
Qty: 6 TABLET | Refills: 0 | Status: SHIPPED | OUTPATIENT
Start: 2025-08-09 | End: 2025-08-12

## 2025-08-09 RX ADMIN — METHYLPREDNISOLONE ACETATE 80 MG: 80 INJECTION, SUSPENSION INTRA-ARTICULAR; INTRALESIONAL; INTRAMUSCULAR; SOFT TISSUE at 10:20

## 2025-08-12 LAB — BONE MARROW REPORT: NORMAL

## 2025-08-15 ENCOUNTER — OFFICE VISIT (OUTPATIENT)
Age: 45
End: 2025-08-15
Payer: COMMERCIAL

## 2025-08-15 VITALS
DIASTOLIC BLOOD PRESSURE: 77 MMHG | OXYGEN SATURATION: 100 % | HEIGHT: 60 IN | WEIGHT: 148 LBS | HEART RATE: 96 BPM | TEMPERATURE: 97.8 F | SYSTOLIC BLOOD PRESSURE: 113 MMHG | BODY MASS INDEX: 29.06 KG/M2

## 2025-08-15 DIAGNOSIS — E03.9 ACQUIRED HYPOTHYROIDISM: Primary | ICD-10-CM

## 2025-08-15 PROCEDURE — 99213 OFFICE O/P EST LOW 20 MIN: CPT | Performed by: INTERNAL MEDICINE

## 2025-08-21 PROBLEM — Z12.11 SCREEN FOR COLON CANCER: Status: RESOLVED | Noted: 2025-05-29 | Resolved: 2025-08-21

## 2025-08-25 ENCOUNTER — OFFICE VISIT (OUTPATIENT)
Dept: SURGERY | Age: 45
End: 2025-08-25
Payer: COMMERCIAL

## 2025-08-25 VITALS
SYSTOLIC BLOOD PRESSURE: 114 MMHG | TEMPERATURE: 97 F | HEART RATE: 94 BPM | WEIGHT: 150 LBS | DIASTOLIC BLOOD PRESSURE: 75 MMHG | BODY MASS INDEX: 29.45 KG/M2 | HEIGHT: 60 IN

## 2025-08-25 DIAGNOSIS — R10.11 RUQ PAIN: Primary | ICD-10-CM

## 2025-08-25 PROCEDURE — 99213 OFFICE O/P EST LOW 20 MIN: CPT | Performed by: SURGERY

## 2025-09-03 ENCOUNTER — OFFICE VISIT (OUTPATIENT)
Dept: ENDOCRINOLOGY | Age: 45
End: 2025-09-03
Payer: COMMERCIAL

## 2025-09-03 VITALS
OXYGEN SATURATION: 98 % | DIASTOLIC BLOOD PRESSURE: 80 MMHG | HEART RATE: 74 BPM | SYSTOLIC BLOOD PRESSURE: 140 MMHG | WEIGHT: 151 LBS | BODY MASS INDEX: 29.49 KG/M2

## 2025-09-03 DIAGNOSIS — E78.2 MIXED HYPERLIPIDEMIA: ICD-10-CM

## 2025-09-03 DIAGNOSIS — E03.9 HYPOTHYROIDISM, UNSPECIFIED TYPE: Primary | ICD-10-CM

## 2025-09-03 DIAGNOSIS — E11.65 TYPE 2 DIABETES MELLITUS WITH HYPERGLYCEMIA, WITHOUT LONG-TERM CURRENT USE OF INSULIN (HCC): ICD-10-CM

## 2025-09-03 DIAGNOSIS — E55.9 VITAMIN D DEFICIENCY: ICD-10-CM

## 2025-09-03 LAB — HBA1C MFR BLD: 7.7 %

## 2025-09-03 PROCEDURE — G2211 COMPLEX E/M VISIT ADD ON: HCPCS | Performed by: CLINICAL NURSE SPECIALIST

## 2025-09-03 PROCEDURE — 3051F HG A1C>EQUAL 7.0%<8.0%: CPT | Performed by: CLINICAL NURSE SPECIALIST

## 2025-09-03 PROCEDURE — 83036 HEMOGLOBIN GLYCOSYLATED A1C: CPT | Performed by: CLINICAL NURSE SPECIALIST

## 2025-09-03 PROCEDURE — 99214 OFFICE O/P EST MOD 30 MIN: CPT | Performed by: CLINICAL NURSE SPECIALIST

## (undated) DEVICE — SUTURE VCRL + SZ 3-0 L18IN ABSRB UD PS-1 L24MM 3/8 CIR PRIM VCP683G

## (undated) DEVICE — ZIMMER® STERILE DISPOSABLE TOURNIQUET CUFF WITH PLC, DUAL PORT, SINGLE BLADDER, 18 IN. (46 CM)

## (undated) DEVICE — SURGICAL PROCEDURE PACK HND

## (undated) DEVICE — DOUBLE BASIN SET: Brand: MEDLINE INDUSTRIES, INC.

## (undated) DEVICE — GRADUATE TRIANG MEASURE 1000ML BLK PRNT

## (undated) DEVICE — GAUZE,SPONGE,4"X4",4PLY,STRL,LF: Brand: MEDLINE

## (undated) DEVICE — GOWN,SIRUS,FABRNF,L,20/CS: Brand: MEDLINE

## (undated) DEVICE — COVER HNDL LT DISP

## (undated) DEVICE — 4-PORT MANIFOLD: Brand: NEPTUNE 2